# Patient Record
Sex: FEMALE | Race: WHITE | NOT HISPANIC OR LATINO | Employment: OTHER | URBAN - METROPOLITAN AREA
[De-identification: names, ages, dates, MRNs, and addresses within clinical notes are randomized per-mention and may not be internally consistent; named-entity substitution may affect disease eponyms.]

---

## 2017-01-30 ENCOUNTER — ANTICOAGULATION VISIT (OUTPATIENT)
Dept: VASCULAR LAB | Facility: MEDICAL CENTER | Age: 72
End: 2017-01-30
Attending: NURSE PRACTITIONER
Payer: MEDICARE

## 2017-01-30 DIAGNOSIS — G45.9 TRANSIENT CEREBRAL ISCHEMIA, UNSPECIFIED TYPE: ICD-10-CM

## 2017-01-30 LAB — INR PPP: 1.1 (ref 2–3.5)

## 2017-01-30 PROCEDURE — 99211 OFF/OP EST MAY X REQ PHY/QHP: CPT

## 2017-01-30 PROCEDURE — 85610 PROTHROMBIN TIME: CPT

## 2017-01-30 NOTE — MR AVS SNAPSHOT
Felipa Malloy Creveling   2017 4:00 PM   Anticoagulation Visit   MRN: [id number]    Department:  Vascular Medicine   Dept Phone:  [phone]    Description:  Female : 1945   Provider:  [facility] EXAM 5           Allergies as of 2017     No Known Allergies      You were diagnosed with     Transient cerebral ischemia, unspecified type   [[id number]]         Vital Signs     Smoking Status                   Former Smoker           Basic Information     Date Of Birth Sex Race Ethnicity Preferred Language    1945 Female White Non- English      Your appointments     2017  1:00 PM   Established Patient with [facility] EXAM 4   [facility]  (--)    [address] [facility] [address]   [phone]              Problem List              ICD-10-CM Priority Class Noted - Resolved    Hypothyroidism E03.9 Low  2012 - Present    Bradycardia R00.1 Medium  2012 - Present    Ventricular bigeminy I49.9 Medium  2012 - Present    TIA (transient ischemic attack) G45.9 Medium  2013 - Present    Eyelid abnormality H02.9 Low  3/18/2014 - Present    Cerebral infarction (CMS-HCC) I63.9 Medium  2016 - Present    Aphasia R47.01 Low  2016 - Present    VPC's (ventricular premature complexes) I49.3 Medium  2016 - Present    S/P ablation of ventricular arrhythmia Z98.890, Z86.79 Medium  2016 - Present    Decreased GFR R94.4 Medium  2016 - Present      Health Maintenance        Date Due Completion Dates    IMM DTaP/Tdap/Td Vaccine (1 - Tdap) 1963 ---    PAP SMEAR 1965 ---    COLONOSCOPY 1994 ---    IMM ZOSTER VACCINE 2004 ---    IMM INFLUENZA (1) 2016 ---    BONE DENSITY 2016, 3/17/2008    MAMMOGRAM 2017, 2015, 3/3/2014, 2012, 2011, 2010, 2010, 2009, 2009, 2008, 2008, 2007, 2005, 2004    IMM  PNEUMOCOCCAL 65+ (ADULT) LOW/MEDIUM RISK SERIES (2 of 2 - PPSV23) 10/11/2017 10/11/2016            Results     POCT Protime      Component    INR    1.1                        Current Immunizations     13-VALENT PCV PREVNAR 10/11/2016      Below and/or attached are the medications your provider expects you to take. Review all of your home medications and newly ordered medications with your provider and/or pharmacist. Follow medication instructions as directed by your provider and/or pharmacist. Please keep your medication list with you and share with your provider. Update the information when medications are discontinued, doses are changed, or new medications (including over-the-counter products) are added; and carry medication information at all times in the event of emergency situations     Allergies:  No Known Allergies          Medications  Valid as of: January 30, 2017 -  4:27 PM    Generic Name Brand Name Tablet Size Instructions for use    Apixaban (Tab) ELIQUIS 5mg Take 1 Tab by mouth 2 Times a Day.        Atorvastatin Calcium (Tab) LIPITOR 20 MG Take 2 Tabs by mouth every bedtime.        Cholecalciferol (Tab) cholecalciferol 1000 UNIT Take 1,000 Units by mouth every day.        Levothyroxine Sodium   Take  by mouth.        Liothyronine Sodium (Tab) CYTOMEL 5 MCG Take 5 mcg by mouth every morning.        Magnesium Oxide (Cap) Magnesium 500 MG Take  by mouth.        Omega-3 Fatty Acids (CAPSULE DELAYED RELEASE) fish oil 1000 MG Take 1,000 mg by mouth 3 times a day.        .                 Medicines prescribed today were sent to:     [facility] - [address]    [address] [place] [address] [address] [facility] [address]    Phone: [phone] Fax: [phone]    Open 24 Hours?: No    [facility] [address] [place] [address] - [address] [address]    Phone: [phone] Fax: [phone]    Open 24 Hours?: No      Medication refill instructions:        If your prescription bottle indicates you have medication refills left, it is not necessary to call your provider’s office. Please contact your pharmacy and they will refill your medication.    If your prescription bottle indicates you do not have any refills left, you may request refills at any time through one of the following ways: The online [employer] system (except Urgent Care), by calling your provider’s office, or by asking your pharmacy to contact your provider’s office with a refill request. Medication refills are processed only during regular business hours and may not be available until the next business day. Your provider may request additional information or to have a follow-up visit with you prior to refilling your medication.   *Please Note: Medication refills are assigned a new Rx number when refilled electronically. Your pharmacy may indicate that no refills were authorized even though a new prescription for the same medication is available at the pharmacy. Please request the medicine by name with the pharmacy before contacting your provider for a refill.        Your To Do List     Future Labs/Procedures Complete By Expires    BASIC METABOLIC PANEL  As directed 1/30/2018    CBC W/O DIFF OR PLATELETS  As directed 1/30/2018      Warfarin Dosing Calendar   January 2017 Details    Sun Mon Tue Wed Thu Fri Sat     1               2               3               4               5               6               7                 8               9               10               11               12               13               14                 15               16               17               18               19               20               21                 22               23               24               25               26               27               28                 29               30   1.1      See details      31                    Date Details   01/30 This INR check   INR: 1.1                    Warfarin Dosing Calendar   February 2017 Details    Sun Mon Tue Wed Thu Fri Sat        1               2               3               4                 5               6               7               8               9               10               11                 12               13               14               15               16               17               18                 19               20               21               22               23               24               25                 26               27               28                    Date Details   No additional details              Warfarin Dosing Calendar   March 2017 Details    Sun Mon Tue Wed Thu Fri Sat        1               2               3               4                 5               6               7               8               9               10               11                 12               13               14               15               16               17               18                 19               20               21               22               23               24               25                 26               27               28               29               30               31                 Date Details   No additional details              Warfarin Dosing Calendar   April 2017 Details    Sun Mon Tue Wed Thu Fri Sat           1                 2               3               4               5               6               7               8                 9               10               11               12               13               14               15                 16               17               18               19               20               21               22                 23               24               25               26               27               28               29                 30                      Date Details   No additional details               Warfarin Dosing Calendar   May 2017 Details    Sun Mon Tue Wed Thu Fri Sat      1               2               3               4               5               6                 7               8               9               10               11               12               13                 14               15               16               17               18               19               20                 21               22               23               24               25               26               27                 28               29               30               31                   Date Details   No additional details              Warfarin Dosing Calendar   June 2017 Details    Sun Mon Tue Wed Thu Fri Sat         1               2               3                 4               5               6               7               8               9               10                 11               12               13               14               15               16               17                 18               19               20               21               22               23               24                 25               26               27               28               29               30                 Date Details   No additional details              Warfarin Dosing Calendar   July 2017 Details    Sun Mon Tue Wed Thu Fri Sat           1                 2               3               4               5               6               7               8                 9               10               11               12               13               14               15                 16               17               18               19               20               21               22                 23               24               25               26               27               28               29                 30               31                     Date Details   No  additional details    Date of next INR:  7/30/2017              MyChart Access Code: Activation code not generated  Current MyChart Status: Active

## 2017-01-31 NOTE — PROGRESS NOTES
[facility]               Target end date:Indefinite    Health Status Since Last Assessment   Patient denies any new relevant medical problems, ED visits or hospitalizations   Patient denies any embolic events (stroke/tia/systemic embolism)    Adherence with DOAC Therapy   Pt has not missed any doses in the average week    Bleeding Risk Assessment     Denies Epistaxis   Pt denies any excessive or unusual bleeding/hematomas.  Pt denies any GI bleeds or hematemesis.  Pt denies any concerning daily headache or sub dural hematoma symptoms.     Pt denies any hematuria or abnormal vaginal bleeding.   Latest Hemoglobin 14   ETOH overuse Denies     Creatinine Clearance/Renal Function     Latest ClCr CrCl ~83mL/min     Drug Interactions   ASA/other antiplatelets Denies   NSAID Denies   Other drug interactions Negative    Examination   Significant gait impairment/imbalance/high risk for falls? No obvious risks    Final Assessment and Recommendations:   Patient appears stable from the anticoagulation staindpoint.     Benefits of continued DOAC therapy outweigh risks for this patient   Recommend pt continue with current DOAC therapy Eliquis 5 mg BID     Other Actions:   Provided pt with discount card to help reduce cost of medication.  Instructed pt to contact clinic if cost of medication becomes a barrier to therapy.    Follow up:   BMP & CBC ordered    Will follow up in clinic in 6 months    Martinez Hunt, PHARMD

## 2017-07-15 LAB
BUN SERPL-MCNC: 23 MG/DL (ref 8–27)
BUN/CREAT SERPL: 28 (ref 12–28)
CALCIUM SERPL-MCNC: 9.7 MG/DL (ref 8.7–10.3)
CHLORIDE SERPL-SCNC: 102 MMOL/L (ref 96–106)
CO2 SERPL-SCNC: 24 MMOL/L (ref 18–29)
CREAT SERPL-MCNC: 0.82 MG/DL (ref 0.57–1)
ERYTHROCYTE [DISTWIDTH] IN BLOOD BY AUTOMATED COUNT: 14.1 % (ref 12.3–15.4)
GLUCOSE SERPL-MCNC: 91 MG/DL (ref 65–99)
HCT VFR BLD AUTO: 42.3 % (ref 34–46.6)
HGB BLD-MCNC: 14.1 G/DL (ref 11.1–15.9)
MCH RBC QN AUTO: 31.5 PG (ref 26.6–33)
MCHC RBC AUTO-ENTMCNC: 33.3 G/DL (ref 31.5–35.7)
MCV RBC AUTO: 95 FL (ref 79–97)
NRBC BLD AUTO-RTO: NORMAL %
POTASSIUM SERPL-SCNC: 4.3 MMOL/L (ref 3.5–5.2)
RBC # BLD AUTO: 4.47 X10E6/UL (ref 3.77–5.28)
SODIUM SERPL-SCNC: 141 MMOL/L (ref 134–144)
WBC # BLD AUTO: 4.5 X10E3/UL (ref 3.4–10.8)

## 2017-07-31 ENCOUNTER — ANTICOAGULATION VISIT (OUTPATIENT)
Dept: VASCULAR LAB | Facility: MEDICAL CENTER | Age: 72
End: 2017-07-31
Attending: INTERNAL MEDICINE
Payer: MEDICARE

## 2017-07-31 DIAGNOSIS — G45.9 TRANSIENT CEREBRAL ISCHEMIA, UNSPECIFIED TYPE: ICD-10-CM

## 2017-07-31 LAB
INR BLD: 1.2 (ref 0.9–1.2)
INR PPP: 1.2 (ref 2–3.5)

## 2017-07-31 PROCEDURE — 99211 OFF/OP EST MAY X REQ PHY/QHP: CPT

## 2017-07-31 PROCEDURE — 85610 PROTHROMBIN TIME: CPT

## 2017-07-31 NOTE — MR AVS SNAPSHOT
Felipa Malloy Creveling   2017 1:00 PM   Anticoagulation Visit   MRN: [id number]    Department:  Vascular Medicine   Dept Phone:  [phone]    Description:  Female : 1945   Provider:  [facility] EXAM 4           Allergies as of 2017     No Known Allergies      Vital Signs     Smoking Status                   Former Smoker           Basic Information     Date Of Birth Sex Race Ethnicity Preferred Language    1945 Female White Non- English      Your appointments     May 07, 2018  1:00 PM   Established Patient with [facility] EXAM 5   [facility]  (--)    [address] [facility] [address]   [phone]              Problem List              ICD-10-CM Priority Class Noted - Resolved    Hypothyroidism E03.9 Low  2012 - Present    Bradycardia R00.1 Medium  2012 - Present    Ventricular bigeminy I49.9 Medium  2012 - Present    TIA (transient ischemic attack) G45.9 Medium  2013 - Present    Eyelid abnormality H02.9 Low  3/18/2014 - Present    Cerebral infarction (CMS-HCC) I63.9 Medium  2016 - Present    Aphasia R47.01 Low  2016 - Present    VPC's (ventricular premature complexes) I49.3 Medium  2016 - Present    S/P ablation of ventricular arrhythmia Z98.890, Z86.79 Medium  2016 - Present    Decreased GFR R94.4 Medium  2016 - Present      Health Maintenance        Date Due Completion Dates    IMM DTaP/Tdap/Td Vaccine (1 - Tdap) 1963 ---    PAP SMEAR 1965 ---    COLONOSCOPY 1994 ---    IMM ZOSTER VACCINE 2004 ---    BONE DENSITY 2016, 3/17/2008    MAMMOGRAM 2017, 2015, 3/3/2014, 2012, 2011, 2010, 2010, 2009, 2009, 2008, 2008, 2007, 2005, 2004    IMM INFLUENZA (1) 2017 ---    IMM PNEUMOCOCCAL 65+ (ADULT) LOW/MEDIUM RISK SERIES (2 of 2 - PPSV23) 10/11/2017 10/11/2016               Results     POCT Protime      Component    INR    1.2                        Current Immunizations     13-VALENT PCV PREVNAR 10/11/2016      Below and/or attached are the medications your provider expects you to take. Review all of your home medications and newly ordered medications with your provider and/or pharmacist. Follow medication instructions as directed by your provider and/or pharmacist. Please keep your medication list with you and share with your provider. Update the information when medications are discontinued, doses are changed, or new medications (including over-the-counter products) are added; and carry medication information at all times in the event of emergency situations     Allergies:  No Known Allergies          Medications  Valid as of: July 31, 2017 -  1:17 PM    Generic Name Brand Name Tablet Size Instructions for use    Apixaban (Tab) ELIQUIS 5mg Take 1 Tab by mouth 2 Times a Day.        Atorvastatin Calcium (Tab) LIPITOR 20 MG Take 2 Tabs by mouth every bedtime.        Cholecalciferol (Tab) cholecalciferol 1000 UNIT Take 1,000 Units by mouth every day.        Coenzyme Q10 (Cap) coenzyme Q-10 30 MG Take 60 mg by mouth every day.        Levothyroxine Sodium   Take  by mouth.        Liothyronine Sodium (Tab) CYTOMEL 5 MCG Take 5 mcg by mouth every morning.        Magnesium Oxide (Cap) Magnesium 500 MG Take  by mouth.        Omega-3 Fatty Acids (CAPSULE DELAYED RELEASE) fish oil 1000 MG Take 1,000 mg by mouth 3 times a day.        .                 Medicines prescribed today were sent to:     [facility] - [address] [facility] [address]    Phone: [phone] Fax: [phone]    Open 24 Hours?: No    [facility] [address] [place], [place] - [address] [address]    Phone: [phone] Fax: [phone]    Open 24 Hours?: No      Medication refill instructions:       If your  prescription bottle indicates you have medication refills left, it is not necessary to call your provider’s office. Please contact your pharmacy and they will refill your medication.    If your prescription bottle indicates you do not have any refills left, you may request refills at any time through one of the following ways: The online [employer] system (except Urgent Care), by calling your provider’s office, or by asking your pharmacy to contact your provider’s office with a refill request. Medication refills are processed only during regular business hours and may not be available until the next business day. Your provider may request additional information or to have a follow-up visit with you prior to refilling your medication.   *Please Note: Medication refills are assigned a new Rx number when refilled electronically. Your pharmacy may indicate that no refills were authorized even though a new prescription for the same medication is available at the pharmacy. Please request the medicine by name with the pharmacy before contacting your provider for a refill.        Warfarin Dosing Calendar   July 2017 Details    Sun Mon Tue Wed Thu Fri Sat           1                 2               3               4               5               6               7               8                 9               10               11               12               13               14               15                 16               17               18               19               20               21               22                 23               24               25               26               27               28               29                 30               31   1.2      See details            Date Details   07/31 This INR check   INR: 1.2      Date of next INR: No date specified              [employer] Access Code: Activation code not generated  Current [employer] Status: Active

## 2017-07-31 NOTE — PROGRESS NOTES
Target end date:Indefinite     Indication: Afib     Drug: Eliquis      CHADsVASC = at least 4    Health Status Since Last Assessment   Patient denies any new relevant medical problems, ED visits or hospitalizations   Patient denies any embolic events (stroke/tia/systemic embolism)    Adherence with DOAC Therapy   Pt has not missed any doses in the average week    Bleeding Risk Assessment     Denies Epistaxis   Pt denies any excessive or unusual bleeding/hematomas.  Pt denies any GI bleeds or hematemesis.  Pt denies any concerning daily headache or sub dural hematoma symptoms.     Pt denies any hematuria or abnormal vaginal bleeding.   Latest Hemoglobin 14.1   ETOH overuse Denies     Creatinine Clearance/Renal Function     Latest CR:  0.82     Drug Interactions   ASA/other antiplatelets Denies   NSAID Denies   Other drug interactions:  Pt currently on a high dose of tumeric.  Pt self started the medication.  Educated pt of increased bleeding risks. Pt to d/c tumeric.      Examination   Blood Pressure 129/71  HR 55   Symptomatic hypotension None   Significant gait impairment/imbalance/high risk for falls? No obvious risks.      Final Assessment and Recommendations:   Patient appears stable from the anticoagulation staindpoint.     Benefits of continued DOAC therapy outweigh risks for this patient   Recommend pt continue with current DOAC therapy.     Follow up:   Will follow up with patient via clinic in 9 months.      Martinez Hunt, ANNETTAD

## 2017-09-18 DIAGNOSIS — I47.10 SVT (SUPRAVENTRICULAR TACHYCARDIA): ICD-10-CM

## 2017-09-19 RX ORDER — APIXABAN 5 MG/1
TABLET, FILM COATED ORAL
Qty: 180 TAB | Refills: 1 | Status: SHIPPED | OUTPATIENT
Start: 2017-09-19 | End: 2018-03-19 | Stop reason: SDUPTHER

## 2017-09-26 ENCOUNTER — APPOINTMENT (OUTPATIENT)
Dept: RADIOLOGY | Facility: MEDICAL CENTER | Age: 72
End: 2017-09-26
Attending: NURSE PRACTITIONER
Payer: MEDICARE

## 2017-11-01 DIAGNOSIS — Z86.73 HISTORY OF STROKE: ICD-10-CM

## 2017-11-13 ENCOUNTER — HOSPITAL ENCOUNTER (OUTPATIENT)
Dept: RADIOLOGY | Facility: MEDICAL CENTER | Age: 72
End: 2017-11-13
Attending: NURSE PRACTITIONER
Payer: MEDICARE

## 2017-11-13 DIAGNOSIS — Z12.31 VISIT FOR SCREENING MAMMOGRAM: ICD-10-CM

## 2017-11-13 PROCEDURE — G0202 SCR MAMMO BI INCL CAD: HCPCS

## 2018-01-22 ENCOUNTER — OFFICE VISIT (OUTPATIENT)
Dept: CARDIOLOGY | Facility: MEDICAL CENTER | Age: 73
End: 2018-01-22
Payer: MEDICARE

## 2018-01-22 VITALS
WEIGHT: 191 LBS | OXYGEN SATURATION: 97 % | SYSTOLIC BLOOD PRESSURE: 118 MMHG | BODY MASS INDEX: 27.35 KG/M2 | HEIGHT: 70 IN | HEART RATE: 66 BPM | DIASTOLIC BLOOD PRESSURE: 72 MMHG

## 2018-01-22 DIAGNOSIS — Z98.890 S/P ABLATION OF VENTRICULAR ARRHYTHMIA: ICD-10-CM

## 2018-01-22 DIAGNOSIS — I47.10 SVT (SUPRAVENTRICULAR TACHYCARDIA): ICD-10-CM

## 2018-01-22 DIAGNOSIS — Z86.79 S/P ABLATION OF VENTRICULAR ARRHYTHMIA: ICD-10-CM

## 2018-01-22 LAB — EKG IMPRESSION: NORMAL

## 2018-01-22 PROCEDURE — 99214 OFFICE O/P EST MOD 30 MIN: CPT | Performed by: INTERNAL MEDICINE

## 2018-01-22 PROCEDURE — 93000 ELECTROCARDIOGRAM COMPLETE: CPT | Performed by: INTERNAL MEDICINE

## 2018-01-22 ASSESSMENT — ENCOUNTER SYMPTOMS: PALPITATIONS: 0

## 2018-01-22 NOTE — PROGRESS NOTES
Subjective:   Felipa Stuart is a 72 y.o. female who presents today for follow up of pvc s/p ablation    Chief Complaint: relatively speaking doing well she feels no skipping or anything.    Hands get cold.    Never felt palpiations.      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 at [facility]   • OTHER  2013    bladder sling    • OTHER ABDOMINAL SURGERY  2013    cholecystectomy   • CARDIAC CATH      ablation   • CATARACT EXTRACTION WITH IOL     • THYROID LOBECTOMY       Family History   Problem Relation Age of Onset   • Cancer Mother      History   Smoking Status   • Former Smoker   • Years: 20.00   • Types: Cigarettes   • Quit date: 1/1/1980   Smokeless Tobacco   • Never Used     No Known Allergies  Outpatient Encounter Prescriptions as of 1/22/2018   Medication Sig Dispense Refill   • ELIQUIS 5 MG Tab Take 1 tablet by mouth two  times daily 180 Tab 1   • coenzyme Q-10 30 MG capsule Take 60 mg by mouth every day.     • vitamin D (CHOLECALCIFEROL) 1000 UNIT Tab Take 1,000 Units by mouth every day.     • Magnesium 500 MG Cap Take  by mouth.     • atorvastatin (LIPITOR) 20 MG Tab Take 2 Tabs by mouth every bedtime. 30 Tab 11   • Levothyroxine Sodium (LEVOXYL PO) Take  by mouth.     • Omega-3 Fatty Acids (FISH OIL) 1000 MG CAPSULE DELAYED  "RELEASE Take 1,000 mg by mouth 3 times a day. 90 Cap 2   • liothyronine (CYTOMEL) 5 MCG TABS Take 5 mcg by mouth every morning.       No facility-administered encounter medications on file as of 1/22/2018.      Review of Systems   Constitutional: Negative for malaise/fatigue.   Cardiovascular: Negative for palpitations.   Gastrointestinal:        Issues with hiatal hernia        Objective:   /72   Pulse 66   Ht 1.778 m (5' 10\")   Wt 86.6 kg (191 lb)   SpO2 97%   BMI 27.41 kg/m²     Physical Exam   Constitutional: She is oriented to person, place, and time. She appears well-developed and well-nourished. No distress.   HENT:   Head: Normocephalic and atraumatic.   Right Ear: External ear normal.   Left Ear: External ear normal.   Mouth/Throat: Oropharynx is clear and moist.   Eyes: Conjunctivae and EOM are normal. Right eye exhibits no discharge. Left eye exhibits no discharge. No scleral icterus.   Neck: Normal range of motion. Neck supple. No JVD present. No tracheal deviation present. No thyromegaly present.   Cardiovascular: Normal rate, regular rhythm, normal heart sounds and intact distal pulses.  Exam reveals no gallop and no friction rub.    No murmur heard.  Pulmonary/Chest: Effort normal and breath sounds normal. No stridor. No respiratory distress. She has no wheezes. She has no rales.   Abdominal: Soft. She exhibits no distension.   Musculoskeletal: She exhibits no edema or tenderness.   Neurological: She is alert and oriented to person, place, and time. No cranial nerve deficit. Coordination normal.   Skin: Skin is warm and dry. No rash noted. She is not diaphoretic. No erythema. No pallor.   Psychiatric: She has a normal mood and affect. Her behavior is normal. Judgment and thought content normal.   Vitals reviewed.      Assessment:     1. S/P ablation of ventricular arrhythmia     2. SVT (supraventricular tachycardia) (CMS-[facility])  ZIO PATCH MONITOR       Medical Decision Making:  Today's " Assessment / Status / Plan:   Still on eliquis with previous stroke.  Svt that looks like a tach but some short stuff a fib spectrum.  Still not diagnositc of a fib and will get monitor for surveillance to try to get diagnosis.

## 2018-01-31 ENCOUNTER — TELEPHONE (OUTPATIENT)
Dept: CARDIOLOGY | Facility: MEDICAL CENTER | Age: 73
End: 2018-01-31

## 2018-01-31 ENCOUNTER — NON-PROVIDER VISIT (OUTPATIENT)
Dept: CARDIOLOGY | Facility: MEDICAL CENTER | Age: 73
End: 2018-01-31
Attending: INTERNAL MEDICINE
Payer: MEDICARE

## 2018-01-31 DIAGNOSIS — I47.10 SVT (SUPRAVENTRICULAR TACHYCARDIA): ICD-10-CM

## 2018-02-26 ENCOUNTER — TELEPHONE (OUTPATIENT)
Dept: CARDIOLOGY | Facility: MEDICAL CENTER | Age: 73
End: 2018-02-26

## 2018-02-27 NOTE — TELEPHONE ENCOUNTER
I do not have the results available for me to give you an overall review. Dr Sargent is out of the office with this week so I will let you know when he returns.       ===View-only below this line===      ----- Message -----     From: Felipa Stuart     Sent: 2/26/2018  3:44 PM PST       To: Stiven Sargent M.D.  Subject: Test Result Question    Wondering what the result of the heart monitor was?  Thank you  Felipa Stuart

## 2018-03-05 ENCOUNTER — TELEPHONE (OUTPATIENT)
Dept: CARDIOLOGY | Facility: MEDICAL CENTER | Age: 73
End: 2018-03-05

## 2018-03-05 PROCEDURE — 0298T PR EXT ECG > 48HR TO 21 DAY REVIEW AND INTERPRETATN: CPT | Performed by: INTERNAL MEDICINE

## 2018-03-05 PROCEDURE — 0296T PR EXT ECG > 48HR TO 21 DAY RCRD W/CONECT INTL RCRD: CPT | Performed by: INTERNAL MEDICINE

## 2018-03-05 NOTE — TELEPHONE ENCOUNTER
Dr. Sargent reviewed recent [facility] report. Per Dr. Sargent: showed no diagnostic a fib, brief non-sustained SVT (longest<3sec.). Pt. Can schedule [facility] to discuss.  Left message for pt. To call.

## 2018-03-12 ENCOUNTER — OFFICE VISIT (OUTPATIENT)
Dept: CARDIOLOGY | Facility: MEDICAL CENTER | Age: 73
End: 2018-03-12
Payer: MEDICARE

## 2018-03-12 VITALS
DIASTOLIC BLOOD PRESSURE: 70 MMHG | OXYGEN SATURATION: 96 % | HEART RATE: 73 BPM | WEIGHT: 197 LBS | BODY MASS INDEX: 28.2 KG/M2 | HEIGHT: 70 IN | SYSTOLIC BLOOD PRESSURE: 122 MMHG

## 2018-03-12 DIAGNOSIS — I47.10 SVT (SUPRAVENTRICULAR TACHYCARDIA): ICD-10-CM

## 2018-03-12 DIAGNOSIS — R00.1 BRADYCARDIA: ICD-10-CM

## 2018-03-12 LAB — EKG IMPRESSION: NORMAL

## 2018-03-12 PROCEDURE — 99213 OFFICE O/P EST LOW 20 MIN: CPT | Performed by: INTERNAL MEDICINE

## 2018-03-12 PROCEDURE — 93000 ELECTROCARDIOGRAM COMPLETE: CPT | Performed by: INTERNAL MEDICINE

## 2018-03-12 RX ORDER — OMEPRAZOLE 40 MG/1
40 CAPSULE, DELAYED RELEASE ORAL DAILY
COMMUNITY
End: 2022-01-18

## 2018-03-12 ASSESSMENT — ENCOUNTER SYMPTOMS: PALPITATIONS: 1

## 2018-03-12 NOTE — PROGRESS NOTES
Subjective:   Felipa Stuart is a 72 y.o. female who presents today for follow up sp pvc ablation    Chief Complaint: no new issues.    zio nothing diagnostic of a fib  aivr seen.     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;  Service:    • OTHER ABDOMINAL SURGERY  2/2016    hiatal hernia repair, Dr Darnell   • FULL THICKNESS BLOCK RESECTION  3/18/2014    Performed by You Abbott M.D. at [facility]   • OTHER  2013    bladder sling    • OTHER ABDOMINAL SURGERY  2013    cholecystectomy   • CARDIAC CATH      ablation   • CATARACT EXTRACTION WITH IOL     • THYROID LOBECTOMY       Family History   Problem Relation Age of Onset   • Cancer Mother      History   Smoking Status   • Former Smoker   • Years: 20.00   • Types: Cigarettes   • Quit date: 1/1/1980   Smokeless Tobacco   • Never Used     No Known Allergies  Outpatient Encounter Prescriptions as of 3/12/2018   Medication Sig Dispense Refill   • omeprazole (PRILOSEC) 40 MG delayed-release capsule Take 40 mg by mouth 2 times a day.     • ELIQUIS 5 MG Tab Take 1 tablet by mouth two  times daily 180 Tab 1   • coenzyme Q-10 30 MG capsule Take 60 mg by mouth every day.     • vitamin D (CHOLECALCIFEROL) 1000 UNIT Tab Take 1,000 Units by mouth every day.     • Magnesium 500 MG Cap Take  by mouth.     • atorvastatin (LIPITOR) 20 MG Tab Take 2 Tabs by mouth every bedtime. 30 Tab 11   • Levothyroxine Sodium (LEVOXYL PO) Take  "0.05 mcg by mouth every day.     • Omega-3 Fatty Acids (FISH OIL) 1000 MG CAPSULE DELAYED RELEASE Take 1,000 mg by mouth 3 times a day. 90 Cap 2   • liothyronine (CYTOMEL) 5 MCG TABS Take 5 mcg by mouth every morning.       No facility-administered encounter medications on file as of 3/12/2018.      Review of Systems   Cardiovascular: Positive for palpitations.        Objective:   /70   Pulse 73   Ht 1.778 m (5' 10\")   Wt 89.4 kg (197 lb)   SpO2 96%   BMI 28.27 kg/m²     Physical Exam   Constitutional: She is oriented to person, place, and time. She appears well-developed and well-nourished. No distress.   HENT:   Head: Normocephalic and atraumatic.   Right Ear: External ear normal.   Left Ear: External ear normal.   Mouth/Throat: Oropharynx is clear and moist.   Eyes: Conjunctivae and EOM are normal. Right eye exhibits no discharge. Left eye exhibits no discharge. No scleral icterus.   Neck: Normal range of motion. Neck supple. No JVD present. No tracheal deviation present. No thyromegaly present.   Cardiovascular: Normal rate, regular rhythm, normal heart sounds and intact distal pulses.  Exam reveals no gallop and no friction rub.    No murmur heard.  Pulmonary/Chest: Effort normal and breath sounds normal. No stridor. No respiratory distress. She has no wheezes. She has no rales.   Abdominal: Soft. She exhibits no distension.   Musculoskeletal: She exhibits no edema or tenderness.   Neurological: She is alert and oriented to person, place, and time. No cranial nerve deficit. Coordination normal.   Skin: Skin is warm and dry. No rash noted. She is not diaphoretic. No erythema. No pallor.   Psychiatric: She has a normal mood and affect. Her behavior is normal. Judgment and thought content normal.   Vitals reviewed.      Assessment:     1. Bradycardia  EKG   2. SVT (supraventricular tachycardia) (CMS-HCC)         Medical Decision Making:  Today's Assessment / Status / Plan:   A fib spectrum svt but not " long enough to be diagnostic.  Seeing this, I favor continued oac.  She concurs.      No symptoms of armando or short svt or aivr

## 2018-03-19 DIAGNOSIS — I47.10 SVT (SUPRAVENTRICULAR TACHYCARDIA): ICD-10-CM

## 2018-03-19 RX ORDER — APIXABAN 5 MG/1
TABLET, FILM COATED ORAL
Qty: 180 TAB | Refills: 2 | Status: SHIPPED | OUTPATIENT
Start: 2018-03-19 | End: 2019-01-03 | Stop reason: SDUPTHER

## 2018-05-07 ENCOUNTER — ANTICOAGULATION VISIT (OUTPATIENT)
Dept: VASCULAR LAB | Facility: MEDICAL CENTER | Age: 73
End: 2018-05-07
Attending: INTERNAL MEDICINE
Payer: MEDICARE

## 2018-05-07 VITALS — DIASTOLIC BLOOD PRESSURE: 69 MMHG | HEART RATE: 58 BPM | SYSTOLIC BLOOD PRESSURE: 129 MMHG

## 2018-05-07 DIAGNOSIS — I63.9 CEREBRAL INFARCTION, UNSPECIFIED MECHANISM (HCC): ICD-10-CM

## 2018-05-07 PROCEDURE — 99212 OFFICE O/P EST SF 10 MIN: CPT | Performed by: NURSE PRACTITIONER

## 2018-05-07 NOTE — PROGRESS NOTES
Diagnosis: AF  Drug: Eliquis 5 mg BID  Indefinite therapy   CHADsVASC at least 4 (CVA Feb 2016)    Health Status Since Last Assessment  No changes    Adherence with DOAC Therapy  None  BLEEDING RISK ASSESSMENT NB:    Bleeding Risk Assessment  None of the following:  Severe epistaxis   Hemoptysis    Excessive or unusual bruising / hematomas   GIB / melena / BRBPR / hematemesis    Hematuria Abnormal vaginal bleeding    Concerning daily headache or subdural hematoma symptoms    Decreasing hemoglobin or new anemia    Latest hemoglobin:     Lab Results   Component Value Date/Time    WBC 4.5 07/14/2017 10:00 AM    WBC 6.6 07/12/2016 03:56 PM    RBC 4.47 07/14/2017 10:00 AM    RBC 4.67 07/12/2016 03:56 PM    HEMOGLOBIN 14.1 07/14/2017 10:00 AM    HEMOGLOBIN 14.0 07/12/2016 03:56 PM    HEMATOCRIT 42.3 07/14/2017 10:00 AM    HEMATOCRIT 42.7 07/12/2016 03:56 PM    MCV 95 07/14/2017 10:00 AM    MCV 91.4 07/12/2016 03:56 PM    MCH 31.5 07/14/2017 10:00 AM    MCH 30.0 07/12/2016 03:56 PM    MCHC 33.3 07/14/2017 10:00 AM    MCHC 32.8 (L) 07/12/2016 03:56 PM    MPV 11.2 07/12/2016 03:56 PM    NEUTSPOLYS 70.60 07/12/2016 03:56 PM    LYMPHOCYTES 21.80 (L) 07/12/2016 03:56 PM    MONOCYTES 5.20 07/12/2016 03:56 PM    EOSINOPHILS 1.20 07/12/2016 03:56 PM    BASOPHILS 0.90 07/12/2016 03:56 PM        EtOH overuse - no  Falls, presyncope, syncope, or seizures  - no  Uncontrolled hypertension - no  CREATININE CLEARANCE /    Creatinine Clearance/Renal Function  Latest eGFR / creatinine:  Lab Results   Component Value Date/Time    SODIUM 141 07/14/2017 10:00 AM    SODIUM 139 07/12/2016 03:56 PM    POTASSIUM 4.3 07/14/2017 10:00 AM    POTASSIUM 3.8 07/12/2016 03:56 PM    CHLORIDE 102 07/14/2017 10:00 AM    CHLORIDE 105 07/12/2016 03:56 PM    CO2 24 07/14/2017 10:00 AM    CO2 26 07/12/2016 03:56 PM    GLUCOSE 91 07/14/2017 10:00 AM    GLUCOSE 111 (H) 07/12/2016 03:56 PM    BUN 23 07/14/2017 10:00 AM    BUN 22 07/12/2016 03:56 PM    CREATININE  0.82 07/14/2017 10:00 AM    CREATININE 1.16 07/12/2016 03:56 PM    BUNCREATRAT 28 07/14/2017 10:00 AM      • cr cl 0.82 (July 2017) - in need of labs    If YES, calculate CrCl (see back)  Any recent dehydrating illness or medications added/changed? i.e. diuretics    Drug Interactions  ASA / other antiplatelets - none  NSAID - none  Other drug interactions   (Review med list / OTCs;)  Current Outpatient Prescriptions on File Prior to Visit   Medication Sig Dispense Refill   • ELIQUIS 5 MG Tab TAKE 1 TABLET BY MOUTH TWO  TIMES DAILY 180 Tab 2   • omeprazole (PRILOSEC) 40 MG delayed-release capsule Take 40 mg by mouth 2 times a day.     • coenzyme Q-10 30 MG capsule Take 60 mg by mouth every day.     • vitamin D (CHOLECALCIFEROL) 1000 UNIT Tab Take 1,000 Units by mouth every day.     • Magnesium 500 MG Cap Take  by mouth.     • atorvastatin (LIPITOR) 20 MG Tab Take 2 Tabs by mouth every bedtime. 30 Tab 11   • Levothyroxine Sodium (LEVOXYL PO) Take 0.05 mcg by mouth every day.     • Omega-3 Fatty Acids (FISH OIL) 1000 MG CAPSULE DELAYED RELEASE Take 1,000 mg by mouth 3 times a day. 90 Cap 2   • liothyronine (CYTOMEL) 5 MCG TABS Take 5 mcg by mouth every morning.       No current facility-administered medications on file prior to visit.        Examination  Blood pressure:  129/69  • Elevated BP  no (sBP greater than 160 mmHg)  • Symptomatic hypotension  no  Significant gait impairment / imbalance / high risk for falls  no    Final Assessment and Recommendations:  Patient appears stable from the anticoagulation standpoint  Benefits of continued DOAC therapy outweigh risks for this patient  Recommend continue current DOAC at same dose    - continue taking Eliquis 5 mg by mouth twice daily  - do not stop this med without permission from your doctor  - get labs done as soon as you can and again prior to next appt  - go to ER for any s/sx of CVA or prolonged bleeding    Other Actions:   The rationale for continued DOAC  therapy  The potential for minor, major or life-threatening bleeding  Dosing instructions, adherence, risks of non-adherence, handling missed doses  Avoiding OTC ASA & NSAIDs & minimizing EtOH to reduce bleeding risks  Dosing around upcoming procedure / surgery if applicable (see back)    Follow up:  Will follow up with patient in 6 months with labs.    Next Appointment: Monday, November 5, 2018 at 1:00 pm.     Yolanda MILES

## 2018-06-27 ENCOUNTER — DOCUMENTATION (OUTPATIENT)
Dept: VASCULAR LAB | Facility: MEDICAL CENTER | Age: 73
End: 2018-06-27

## 2018-06-27 NOTE — PROGRESS NOTES
Received labs from [facility]. Cr 1.00, actual cr cl 69.3, plt 216. She is to continue Xarelto. Will f/u with pt in November.    Yolanda MILES

## 2018-11-06 DIAGNOSIS — I48.91 ATRIAL FIBRILLATION, UNSPECIFIED TYPE (HCC): ICD-10-CM

## 2018-11-20 ENCOUNTER — APPOINTMENT (OUTPATIENT)
Dept: RADIOLOGY | Facility: MEDICAL CENTER | Age: 73
End: 2018-11-20
Attending: NURSE PRACTITIONER
Payer: MEDICARE

## 2018-11-20 ENCOUNTER — ANTICOAGULATION VISIT (OUTPATIENT)
Dept: VASCULAR LAB | Facility: MEDICAL CENTER | Age: 73
End: 2018-11-20
Attending: INTERNAL MEDICINE
Payer: MEDICARE

## 2018-11-20 VITALS — SYSTOLIC BLOOD PRESSURE: 113 MMHG | HEART RATE: 53 BPM | DIASTOLIC BLOOD PRESSURE: 76 MMHG

## 2018-11-20 DIAGNOSIS — Z12.39 ENCOUNTER FOR SCREENING FOR MALIGNANT NEOPLASM OF BREAST: ICD-10-CM

## 2018-11-20 DIAGNOSIS — G45.9 TIA (TRANSIENT ISCHEMIC ATTACK): ICD-10-CM

## 2018-11-20 LAB — INR PPP: 1.1 (ref 2–3.5)

## 2018-11-20 PROCEDURE — 99212 OFFICE O/P EST SF 10 MIN: CPT

## 2018-11-20 PROCEDURE — 77067 SCR MAMMO BI INCL CAD: CPT

## 2018-11-20 PROCEDURE — 85610 PROTHROMBIN TIME: CPT

## 2018-11-20 NOTE — PROGRESS NOTES
Target end date: indefinite     Indication: AF     Drug: Eliqis 5 mg BID     CHADsVASC = at least 4    Health Status Since Last Assessment   Patient denies any new relevant medical problems, ED visits or hospitalizations   Patient denies any embolic events (stroke/tia/systemic embolism)    Adherence with DOAC Therapy   Pt has 0-1 missed any doses in the average week    Bleeding Risk Assessment     Denies Epistaxis   Pt denies any excessive or unusual bleeding/hematomas.  Pt denies any GI bleeds or hematemesis.  Pt denies any concerning daily headache or sub dural hematoma symptoms.     Pt denies any hematuria or abnormal vaginal bleeding.   Latest Hemoglobin WNL   ETOH overuse denies     Creatinine Clearance/Renal Function     Latest ClCr > 50 mL/min     Drug Interactions   Platelets: WNL   ASA/other antiplatelets none   NSAID none   Other drug interactions not of clinical significance.   X Verified no anticonvulsant or azole therapy, education provided for future use.     Examination   Blood Pressure no obvious risk.    Symptomatic hypotension not mentioned.    Significant gait impairment/imbalance/high risk for falls? No obvious risks.     Final Assessment and Recommendations:   Patient appears stable from the anticoagulation staindpoint.     Benefits of continued DOAC therapy outweigh risks for this patient   Recommend pt continue with current DOAC therapy      Other Actions: cmp/ cbc hemogram ordered prior to next visit    Follow up:   Will follow up with patient via clinic in 6 months.    Martinez Hunt, HollyD

## 2018-12-18 ENCOUNTER — APPOINTMENT (OUTPATIENT)
Dept: RADIOLOGY | Facility: MEDICAL CENTER | Age: 73
End: 2018-12-18
Attending: NURSE PRACTITIONER
Payer: MEDICARE

## 2019-01-02 DIAGNOSIS — I47.10 SVT (SUPRAVENTRICULAR TACHYCARDIA): ICD-10-CM

## 2019-01-03 ENCOUNTER — OFFICE VISIT (OUTPATIENT)
Dept: CARDIOLOGY | Facility: MEDICAL CENTER | Age: 74
End: 2019-01-03
Payer: MEDICARE

## 2019-01-03 ENCOUNTER — APPOINTMENT (OUTPATIENT)
Dept: RADIOLOGY | Facility: MEDICAL CENTER | Age: 74
End: 2019-01-03
Attending: NURSE PRACTITIONER
Payer: MEDICARE

## 2019-01-03 VITALS
HEART RATE: 54 BPM | HEIGHT: 70 IN | WEIGHT: 191 LBS | BODY MASS INDEX: 27.35 KG/M2 | DIASTOLIC BLOOD PRESSURE: 72 MMHG | OXYGEN SATURATION: 97 % | SYSTOLIC BLOOD PRESSURE: 120 MMHG

## 2019-01-03 DIAGNOSIS — Z98.890 S/P ABLATION OF VENTRICULAR ARRHYTHMIA: ICD-10-CM

## 2019-01-03 DIAGNOSIS — Z86.79 S/P ABLATION OF VENTRICULAR ARRHYTHMIA: ICD-10-CM

## 2019-01-03 DIAGNOSIS — G45.8 OTHER SPECIFIED TRANSIENT CEREBRAL ISCHEMIAS: ICD-10-CM

## 2019-01-03 DIAGNOSIS — I63.9 CEREBRAL INFARCTION, UNSPECIFIED MECHANISM (HCC): ICD-10-CM

## 2019-01-03 DIAGNOSIS — Z79.01 CHRONIC ANTICOAGULATION: Chronic | ICD-10-CM

## 2019-01-03 DIAGNOSIS — G45.9 TIA (TRANSIENT ISCHEMIC ATTACK): ICD-10-CM

## 2019-01-03 DIAGNOSIS — I47.10 SVT (SUPRAVENTRICULAR TACHYCARDIA): ICD-10-CM

## 2019-01-03 DIAGNOSIS — I44.2 AIVR (ACCELERATED IDIOVENTRICULAR RHYTHM) (HCC): Chronic | ICD-10-CM

## 2019-01-03 DIAGNOSIS — K44.9 HIATAL HERNIA: Chronic | ICD-10-CM

## 2019-01-03 PROCEDURE — 99214 OFFICE O/P EST MOD 30 MIN: CPT | Performed by: INTERNAL MEDICINE

## 2019-01-03 RX ORDER — LEVOTHYROXINE SODIUM 0.05 MG/1
TABLET ORAL
COMMUNITY
Start: 2018-11-21 | End: 2019-07-08

## 2019-01-03 RX ORDER — OMEGA-3-ACID ETHYL ESTERS 1 G/1
CAPSULE, LIQUID FILLED ORAL
COMMUNITY
Start: 2018-12-01 | End: 2019-07-08

## 2019-01-03 RX ORDER — ATORVASTATIN CALCIUM 20 MG/1
40 TABLET, FILM COATED ORAL
Qty: 90 TAB | Refills: 3 | Status: SHIPPED | OUTPATIENT
Start: 2019-01-03 | End: 2019-02-20 | Stop reason: SDUPTHER

## 2019-01-03 ASSESSMENT — ENCOUNTER SYMPTOMS
CLAUDICATION: 0
FEVER: 0
SORE THROAT: 0
DIZZINESS: 0
PALPITATIONS: 0
WEAKNESS: 0
NAUSEA: 0
BLURRED VISION: 0
BRUISES/BLEEDS EASILY: 0
PND: 0
ABDOMINAL PAIN: 0
FOCAL WEAKNESS: 0
FALLS: 0
SHORTNESS OF BREATH: 0
CHILLS: 0
COUGH: 0

## 2019-01-03 NOTE — LETTER
[facility]   [address], [address]  [place] [address]  Phone: [phone]  Fax: [phone]              Felipa Stuart  1945    Encounter Date: 1/3/2019    Mir Carvajal M.D.          PROGRESS NOTE:  Chief Complaint   Patient presents with   • Bradycardia   • Supraventricular Tachycardia (SVT)       Subjective:   Felipa Stuart is a 73 y.o. female who presents today for follow-up of her history of SVT, PVCs status post ablation and AI VR on Zio patch monitoring    Doing on chronic anticoagulation    She follows closely with GI for history of hiatal hernia      Past Medical History:   Diagnosis Date   • AIVR (accelerated idioventricular rhythm) (HCC) on Zio    • Anesthesia     post op nausea   • Arrhythmia     bigeminal PVC's, SVT with questionable Afib   • Bradycardia    • CATARACT    • Chronic anticoagulation - concern for afib    • Hiatal hernia    • Hiatus hernia syndrome     repaired 2/28/2016   • HX: benign breast biopsy     Rt breast   • Hypothyroidism    • Stroke (HCC) 2/28/2016    mild speech delay     Past Surgical History:   Procedure Laterality Date   • RECOVERY  7/13/2016    Procedure: CATH LAB-PVC ABLATION WAGNER-REGAN -CARTO;  Surgeon: [facility];  Location: SURGERY PRE-POST PROC UNIT [facility];  Service:    • OTHER ABDOMINAL SURGERY  2/2016    hiatal hernia repair, Dr Darnell   • FULL THICKNESS BLOCK RESECTION  3/18/2014    Performed by You Abbott M.D. at [facility]   • OTHER  2013    bladder sling    • OTHER ABDOMINAL SURGERY  2013    cholecystectomy   • CARDIAC CATH      ablation   • CATARACT EXTRACTION WITH IOL     • THYROID LOBECTOMY       Family History   Problem Relation Age of Onset   • Cancer Mother      Social History     Social History   • Marital status:      Spouse name: N/A   • Number of children: N/A   • Years of education: N/A     Occupational History   • Not on  file.     Social History Main Topics   • Smoking status: Former Smoker     Years: 20.00     Types: Cigarettes     Quit date: 1/1/1980   • Smokeless tobacco: Never Used   • Alcohol use Yes      Comment: none in 2016   • Drug use: No   • Sexual activity: Not on file     Other Topics Concern   • Not on file     Social History Narrative   • No narrative on file     No Known Allergies  Outpatient Encounter Prescriptions as of 1/3/2019   Medication Sig Dispense Refill   • levothyroxine (SYNTHROID) 50 MCG Tab      • apixaban (ELIQUIS) 5mg Tab Take 1 Tab by mouth 2 Times a Day. 180 Tab 3   • atorvastatin (LIPITOR) 20 MG Tab Take 2 Tabs by mouth every bedtime. 90 Tab 3   • omeprazole (PRILOSEC) 40 MG delayed-release capsule Take 40 mg by mouth 2 times a day.     • vitamin D (CHOLECALCIFEROL) 1000 UNIT Tab Take 1,000 Units by mouth every day.     • Magnesium 500 MG Cap Take  by mouth.     • Omega-3 Fatty Acids (FISH OIL) 1000 MG CAPSULE DELAYED RELEASE Take 1,000 mg by mouth 3 times a day. 90 Cap 2   • liothyronine (CYTOMEL) 5 MCG TABS Take 5 mcg by mouth every morning.     • omega-3 acid ethyl esters (LOVAZA) 1 GM capsule      • [DISCONTINUED] ELIQUIS 5 MG Tab TAKE 1 TABLET BY MOUTH TWO  TIMES DAILY 180 Tab 2   • [DISCONTINUED] coenzyme Q-10 30 MG capsule Take 60 mg by mouth every day.     • [DISCONTINUED] atorvastatin (LIPITOR) 20 MG Tab Take 2 Tabs by mouth every bedtime. 30 Tab 11   • Levothyroxine Sodium (LEVOXYL PO) Take 0.05 mcg by mouth every day.       No facility-administered encounter medications on file as of 1/3/2019.      Review of Systems   Constitutional: Negative for chills and fever.   HENT: Negative for sore throat.    Eyes: Negative for blurred vision.   Respiratory: Negative for cough and shortness of breath.    Cardiovascular: Negative for chest pain, palpitations, claudication, leg swelling and PND.   Gastrointestinal: Negative for abdominal pain and nausea.   Musculoskeletal: Negative for falls and  "joint pain.   Skin: Negative for rash.   Neurological: Negative for dizziness, focal weakness and weakness.   Endo/Heme/Allergies: Does not bruise/bleed easily.        Objective:   /72 (BP Location: Left arm, Patient Position: Sitting, BP Cuff Size: Adult)   Pulse (!) 54   Ht 1.778 m (5' 10\")   Wt 86.6 kg (191 lb)   SpO2 97%   BMI 27.41 kg/m²      Physical Exam   Constitutional: No distress.   HENT:   Mouth/Throat: Oropharynx is clear and moist. No oropharyngeal exudate.   Eyes: No scleral icterus.   Neck: No JVD present.   Cardiovascular: Normal rate and normal heart sounds.  Exam reveals no gallop and no friction rub.    No murmur heard.  Pulmonary/Chest: No respiratory distress. She has no wheezes. She has no rales.   Abdominal: Soft. Bowel sounds are normal.   Musculoskeletal: She exhibits no edema.   Neurological: She is alert.   Skin: No rash noted. She is not diaphoretic.   Psychiatric: She has a normal mood and affect.       Assessment:     1. S/P ablation of ventricular arrhythmia     2. TIA (transient ischemic attack)     3. Cerebral infarction, unspecified mechanism (HCC)     4. Chronic anticoagulation - concern for afib     5. AIVR (accelerated idioventricular rhythm) (HCC) on Zio     6. Hiatal hernia     7. SVT (supraventricular tachycardia) (HCC)  apixaban (ELIQUIS) 5mg Tab   8. Other specified transient cerebral ischemias  atorvastatin (LIPITOR) 20 MG Tab       Medical Decision Making:  Today's Assessment / Status / Plan:     It was my pleasure to meet with Ms. Stuart.    Blood pressure is well controlled.      She is on appropriate statin.    She agrees with long-term anticoagulation    She required endoscopy she can safely hold her Eliquis for at least 2 days    I will see Ms. Stuart back in 1 year time and encouraged her to follow up with us over the phone or e-mail using my MyChart as issues arise.    It is my pleasure to participate in the care of Ms. Stuart.  Please do not " hesitate to contact me with questions or concerns.    Mir Carvajal MD PhD [facility]  Cardiologist [facility]        Riddhi Ortega, OLIVIER.P.N.  [address] [address]  VIA Facsimile: [phone]     Elan Ocasio M.D.  [address] [place] [address]  VIA Facsimile: [phone]

## 2019-01-03 NOTE — PROGRESS NOTES
Chief Complaint   Patient presents with   • Bradycardia   • Supraventricular Tachycardia (SVT)       Subjective:   Felipa Stuart is a 73 y.o. female who presents today for follow-up of her history of SVT, PVCs status post ablation and AI VR on Zio patch monitoring    Doing on chronic anticoagulation    She follows closely with GI for history of hiatal hernia      Past Medical History:   Diagnosis Date   • AIVR (accelerated idioventricular rhythm) (HCC) on Zio    • Anesthesia     post op nausea   • Arrhythmia     bigeminal PVC's, SVT with questionable Afib   • Bradycardia    • CATARACT    • Chronic anticoagulation - concern for afib    • Hiatal hernia    • Hiatus hernia syndrome     repaired 2/28/2016   • HX: benign breast biopsy     Rt breast   • Hypothyroidism    • Stroke (HCC) 2/28/2016    mild speech delay     Past Surgical History:   Procedure Laterality Date   • RECOVERY  7/13/2016    Procedure: CATH LAB-PVC ABLATION WAGNER-REGAN -CARTO;  Surgeon: [facility];  Location: SURGERY PRE-POST PROC UNIT [facility];  Service:    • OTHER ABDOMINAL SURGERY  2/2016    hiatal hernia repair, Dr Darnell   • FULL THICKNESS BLOCK RESECTION  3/18/2014    Performed by You Abbott M.D. at [facility]   • OTHER  2013    bladder sling    • OTHER ABDOMINAL SURGERY  2013    cholecystectomy   • CARDIAC CATH      ablation   • CATARACT EXTRACTION WITH IOL     • THYROID LOBECTOMY       Family History   Problem Relation Age of Onset   • Cancer Mother      Social History     Social History   • Marital status:      Spouse name: N/A   • Number of children: N/A   • Years of education: N/A     Occupational History   • Not on file.     Social History Main Topics   • Smoking status: Former Smoker     Years: 20.00     Types: Cigarettes     Quit date: 1/1/1980   • Smokeless tobacco: Never Used   • Alcohol use Yes      Comment: none in 2016   • Drug use: No   • Sexual activity: Not on file     Other Topics  Concern   • Not on file     Social History Narrative   • No narrative on file     No Known Allergies  Outpatient Encounter Prescriptions as of 1/3/2019   Medication Sig Dispense Refill   • levothyroxine (SYNTHROID) 50 MCG Tab      • apixaban (ELIQUIS) 5mg Tab Take 1 Tab by mouth 2 Times a Day. 180 Tab 3   • atorvastatin (LIPITOR) 20 MG Tab Take 2 Tabs by mouth every bedtime. 90 Tab 3   • omeprazole (PRILOSEC) 40 MG delayed-release capsule Take 40 mg by mouth 2 times a day.     • vitamin D (CHOLECALCIFEROL) 1000 UNIT Tab Take 1,000 Units by mouth every day.     • Magnesium 500 MG Cap Take  by mouth.     • Omega-3 Fatty Acids (FISH OIL) 1000 MG CAPSULE DELAYED RELEASE Take 1,000 mg by mouth 3 times a day. 90 Cap 2   • liothyronine (CYTOMEL) 5 MCG TABS Take 5 mcg by mouth every morning.     • omega-3 acid ethyl esters (LOVAZA) 1 GM capsule      • [DISCONTINUED] ELIQUIS 5 MG Tab TAKE 1 TABLET BY MOUTH TWO  TIMES DAILY 180 Tab 2   • [DISCONTINUED] coenzyme Q-10 30 MG capsule Take 60 mg by mouth every day.     • [DISCONTINUED] atorvastatin (LIPITOR) 20 MG Tab Take 2 Tabs by mouth every bedtime. 30 Tab 11   • Levothyroxine Sodium (LEVOXYL PO) Take 0.05 mcg by mouth every day.       No facility-administered encounter medications on file as of 1/3/2019.      Review of Systems   Constitutional: Negative for chills and fever.   HENT: Negative for sore throat.    Eyes: Negative for blurred vision.   Respiratory: Negative for cough and shortness of breath.    Cardiovascular: Negative for chest pain, palpitations, claudication, leg swelling and PND.   Gastrointestinal: Negative for abdominal pain and nausea.   Musculoskeletal: Negative for falls and joint pain.   Skin: Negative for rash.   Neurological: Negative for dizziness, focal weakness and weakness.   Endo/Heme/Allergies: Does not bruise/bleed easily.        Objective:   /72 (BP Location: Left arm, Patient Position: Sitting, BP Cuff Size: Adult)   Pulse (!) 54   Ht  "1.778 m (5' 10\")   Wt 86.6 kg (191 lb)   SpO2 97%   BMI 27.41 kg/m²     Physical Exam   Constitutional: No distress.   HENT:   Mouth/Throat: Oropharynx is clear and moist. No oropharyngeal exudate.   Eyes: No scleral icterus.   Neck: No JVD present.   Cardiovascular: Normal rate and normal heart sounds.  Exam reveals no gallop and no friction rub.    No murmur heard.  Pulmonary/Chest: No respiratory distress. She has no wheezes. She has no rales.   Abdominal: Soft. Bowel sounds are normal.   Musculoskeletal: She exhibits no edema.   Neurological: She is alert.   Skin: No rash noted. She is not diaphoretic.   Psychiatric: She has a normal mood and affect.       Assessment:     1. S/P ablation of ventricular arrhythmia     2. TIA (transient ischemic attack)     3. Cerebral infarction, unspecified mechanism (HCC)     4. Chronic anticoagulation - concern for afib     5. AIVR (accelerated idioventricular rhythm) ([facility]) on Zio     6. Hiatal hernia     7. SVT (supraventricular tachycardia) ([facility])  apixaban (ELIQUIS) 5mg Tab   8. Other specified transient cerebral ischemias  atorvastatin (LIPITOR) 20 MG Tab       Medical Decision Making:  Today's Assessment / Status / Plan:     It was my pleasure to meet with Ms. Stuart.    Blood pressure is well controlled.      She is on appropriate statin.    She agrees with long-term anticoagulation    She required endoscopy she can safely hold her Eliquis for at least 2 days    I will see Ms. Stuart back in 1 year time and encouraged her to follow up with us over the phone or e-mail using my MyChart as issues arise.    It is my pleasure to participate in the care of Ms. Stuart.  Please do not hesitate to contact me with questions or concerns.    Mir Carvajal MD PhD [facility]  Cardiologist [facility]    "

## 2019-01-16 ENCOUNTER — APPOINTMENT (OUTPATIENT)
Dept: RADIOLOGY | Facility: MEDICAL CENTER | Age: 74
End: 2019-01-16
Attending: NURSE PRACTITIONER
Payer: MEDICARE

## 2019-02-05 ENCOUNTER — HOSPITAL ENCOUNTER (OUTPATIENT)
Dept: RADIOLOGY | Facility: MEDICAL CENTER | Age: 74
End: 2019-02-05
Attending: NURSE PRACTITIONER
Payer: MEDICARE

## 2019-02-05 DIAGNOSIS — N95.1 SYMPTOMATIC MENOPAUSAL OR FEMALE CLIMACTERIC STATES: ICD-10-CM

## 2019-02-05 DIAGNOSIS — M85.88 OTHER SPECIFIED DISORDERS OF BONE DENSITY AND STRUCTURE, OTHER SITE: ICD-10-CM

## 2019-02-05 PROCEDURE — 77080 DXA BONE DENSITY AXIAL: CPT

## 2019-02-20 DIAGNOSIS — G45.8 OTHER SPECIFIED TRANSIENT CEREBRAL ISCHEMIAS: ICD-10-CM

## 2019-02-20 DIAGNOSIS — E78.49 OTHER HYPERLIPIDEMIA: ICD-10-CM

## 2019-02-20 RX ORDER — ATORVASTATIN CALCIUM 20 MG/1
20 TABLET, FILM COATED ORAL
Qty: 90 TAB | Refills: 3 | Status: CANCELLED | OUTPATIENT
Start: 2019-02-20

## 2019-02-20 RX ORDER — ATORVASTATIN CALCIUM 20 MG/1
20 TABLET, FILM COATED ORAL
Qty: 90 TAB | Refills: 3 | Status: SHIPPED | OUTPATIENT
Start: 2019-02-20 | End: 2023-07-06 | Stop reason: SDUPTHER

## 2019-03-18 ENCOUNTER — TELEPHONE (OUTPATIENT)
Dept: CARDIOLOGY | Facility: MEDICAL CENTER | Age: 74
End: 2019-03-18

## 2019-03-18 NOTE — TELEPHONE ENCOUNTER
"Received fax from [facility] (P: [phone] F: [phone]) requesting cardiac clearance for EGD and Eliquis hold for 3 days.      Per MD last OV note, \"She required endoscopy she can safely hold her Eliquis for at least 2 days.\"    Clearance completed with MD recommendations.  Fax sent to [facility], [phone], completed status.    Clearance sent to [facility] for reference.  "

## 2019-05-10 LAB
ALBUMIN SERPL-MCNC: 4.4 G/DL (ref 3.5–4.8)
ALBUMIN/GLOB SERPL: 1.8 {RATIO} (ref 1.2–2.2)
ALP SERPL-CCNC: 80 IU/L (ref 39–117)
ALT SERPL-CCNC: 15 IU/L (ref 0–32)
AST SERPL-CCNC: 21 IU/L (ref 0–40)
BILIRUB SERPL-MCNC: 0.8 MG/DL (ref 0–1.2)
BUN SERPL-MCNC: 27 MG/DL (ref 8–27)
BUN/CREAT SERPL: 30 (ref 12–28)
CALCIUM SERPL-MCNC: 9.3 MG/DL (ref 8.7–10.3)
CHLORIDE SERPL-SCNC: 107 MMOL/L (ref 96–106)
CO2 SERPL-SCNC: 23 MMOL/L (ref 20–29)
CREAT SERPL-MCNC: 0.9 MG/DL (ref 0.57–1)
ERYTHROCYTE [DISTWIDTH] IN BLOOD BY AUTOMATED COUNT: 14.1 % (ref 12.3–15.4)
GLOBULIN SER CALC-MCNC: 2.4 G/DL (ref 1.5–4.5)
GLUCOSE SERPL-MCNC: 100 MG/DL (ref 65–99)
HCT VFR BLD AUTO: 39.9 % (ref 34–46.6)
HGB BLD-MCNC: 13.4 G/DL (ref 11.1–15.9)
MCH RBC QN AUTO: 29.7 PG (ref 26.6–33)
MCHC RBC AUTO-ENTMCNC: 33.6 G/DL (ref 31.5–35.7)
MCV RBC AUTO: 89 FL (ref 79–97)
NRBC BLD AUTO-RTO: NORMAL %
PLATELET # BLD AUTO: 228 X10E3/UL (ref 150–379)
POTASSIUM SERPL-SCNC: 3.7 MMOL/L (ref 3.5–5.2)
PROT SERPL-MCNC: 6.8 G/DL (ref 6–8.5)
RBC # BLD AUTO: 4.51 X10E6/UL (ref 3.77–5.28)
SODIUM SERPL-SCNC: 145 MMOL/L (ref 134–144)
WBC # BLD AUTO: 5.7 X10E3/UL (ref 3.4–10.8)

## 2019-05-21 ENCOUNTER — ANTICOAGULATION VISIT (OUTPATIENT)
Dept: VASCULAR LAB | Facility: MEDICAL CENTER | Age: 74
End: 2019-05-21
Attending: INTERNAL MEDICINE
Payer: MEDICARE

## 2019-05-21 DIAGNOSIS — Z79.01 CHRONIC ANTICOAGULATION: ICD-10-CM

## 2019-05-21 LAB
INR BLD: 1.2 (ref 0.9–1.2)
INR PPP: 1.2 (ref 2–3.5)

## 2019-05-21 PROCEDURE — 85610 PROTHROMBIN TIME: CPT

## 2019-05-21 PROCEDURE — 99211 OFF/OP EST MAY X REQ PHY/QHP: CPT

## 2019-05-21 NOTE — PROGRESS NOTES
Anticoagulation Summary  As of 2019    INR goal:      TTR:   --   INR used for dosin.20 (2019)   Warfarin maintenance plan:   No maintenance plan   Next INR check:   2019   Target end date:   Indefinite    Indications    Chronic anticoagulation - concern for afib [Z79.01]             Anticoagulation Episode Summary     INR check location:       Preferred lab:       Send INR reminders to:       Comments:   Eliquis      Anticoagulation Care Providers     Provider Role Specialty Phone number    BRIELLE Obando Referring [facility] [phone]    [facility] Anticoagulation Services Responsible  [phone]        Anticoagulation Patient Findings        Health Status Since Last Assessment  Any new relevant medical problems, ED visits/hospitalizationsn  Any embolic events (stroke / TIA / systemic embolism)n    Adherence with DOAC Therapy  1 or more missed doses in an average week n  • If yes, number of missed doses n  BLEEDING RISK ASSESSMENT NB:    Bleeding Risk Assessment  Severe epistaxis  n Hemoptysis  n  Excessive or unusual bruising / hematomas n  GIB / melena / BRBPR / hematemesis  n  Hematuria? Abnormal vaginal bleeding  n  Concerning daily headache or subdural hematoma symptoms  n  Decreasing hemoglobin or new anemia  n  Latest hemoglobin:     Lab Results   Component Value Date/Time    WBC 5.7 2019 07:40 AM    WBC 6.6 2016 03:56 PM    RBC 4.51 2019 07:40 AM    RBC 4.67 2016 03:56 PM    HEMOGLOBIN 13.4 2019 07:40 AM    HEMOGLOBIN 14.0 2016 03:56 PM    HEMATOCRIT 39.9 2019 07:40 AM    HEMATOCRIT 42.7 2016 03:56 PM    MCV 89 2019 07:40 AM    MCV 91.4 2016 03:56 PM    MCH 29.7 2019 07:40 AM    MCH 30.0 2016 03:56 PM    MCHC 33.6 2019 07:40 AM    MCHC 32.8 (L) 2016 03:56 PM    MPV 11.2 2016 03:56 PM    NEUTSPOLYS 70.60 2016 03:56 PM    LYMPHOCYTES 21.80 (L) 2016 03:56 PM    MONOCYTES 5.20  07/12/2016 03:56 PM    EOSINOPHILS 1.20 07/12/2016 03:56 PM    BASOPHILS 0.90 07/12/2016 03:56 PM        EtOH overuse  n  Falls, presyncope, syncope, or seizures  n  Uncontrolled hypertension * **  CREATININE CLEARANCE /    Creatinine Clearance/Renal Function  Latest eGFR / creatinine:  Lab Results   Component Value Date/Time    SODIUM 145 (H) 05/09/2019 07:40 AM    SODIUM 139 07/12/2016 03:56 PM    POTASSIUM 3.7 05/09/2019 07:40 AM    POTASSIUM 3.8 07/12/2016 03:56 PM    CHLORIDE 107 (H) 05/09/2019 07:40 AM    CHLORIDE 105 07/12/2016 03:56 PM    CO2 23 05/09/2019 07:40 AM    CO2 26 07/12/2016 03:56 PM    GLUCOSE 100 (H) 05/09/2019 07:40 AM    GLUCOSE 111 (H) 07/12/2016 03:56 PM    BUN 27 05/09/2019 07:40 AM    BUN 22 07/12/2016 03:56 PM    CREATININE 0.90 05/09/2019 07:40 AM    CREATININE 1.16 07/12/2016 03:56 PM    BUNCREATRAT 30 (H) 05/09/2019 07:40 AM      • Is eGFR less than 50ml/min  n  If YES, calculate CrCl (see back)  Any recent dehydrating illness or medications added/changed? i.e. diuretics    Drug Interactions  ASA / other antiplatelets.n  NSAID n  Other drug interactions  n (Review med list / OTCs;)  Current Outpatient Prescriptions on File Prior to Visit   Medication Sig Dispense Refill   • atorvastatin (LIPITOR) 20 MG Tab Take 1 Tab by mouth every bedtime. 90 Tab 3   • levothyroxine (SYNTHROID) 50 MCG Tab      • omega-3 acid ethyl esters (LOVAZA) 1 GM capsule      • apixaban (ELIQUIS) 5mg Tab Take 1 Tab by mouth 2 Times a Day. 180 Tab 3   • omeprazole (PRILOSEC) 40 MG delayed-release capsule Take 40 mg by mouth 2 times a day.     • vitamin D (CHOLECALCIFEROL) 1000 UNIT Tab Take 1,000 Units by mouth every day.     • Magnesium 500 MG Cap Take  by mouth.     • Levothyroxine Sodium (LEVOXYL PO) Take 0.05 mcg by mouth every day.     • Omega-3 Fatty Acids (FISH OIL) 1000 MG CAPSULE DELAYED RELEASE Take 1,000 mg by mouth 3 times a day. 90 Cap 2   • liothyronine (CYTOMEL) 5 MCG TABS Take 5 mcg by mouth every  morning.       No current facility-administered medications on file prior to visit.        Final Assessment and Recommendations:  Patient appears stable from the anticoagulation standpoint  Benefits of continued DOAC therapy outweigh risks for this patient  Recommend continue current DOAC at same dose    Follow up:  Will follow up with patient in  6 months

## 2019-07-08 DIAGNOSIS — Z01.812 PRE-OPERATIVE LABORATORY EXAMINATION: ICD-10-CM

## 2019-07-08 DIAGNOSIS — Z01.810 PRE-OPERATIVE CARDIOVASCULAR EXAMINATION: ICD-10-CM

## 2019-07-08 LAB
ANION GAP SERPL CALC-SCNC: 8 MMOL/L (ref 0–11.9)
BUN SERPL-MCNC: 23 MG/DL (ref 8–22)
CALCIUM SERPL-MCNC: 9.8 MG/DL (ref 8.5–10.5)
CHLORIDE SERPL-SCNC: 105 MMOL/L (ref 96–112)
CO2 SERPL-SCNC: 26 MMOL/L (ref 20–33)
CREAT SERPL-MCNC: 0.97 MG/DL (ref 0.5–1.4)
EKG IMPRESSION: NORMAL
ERYTHROCYTE [DISTWIDTH] IN BLOOD BY AUTOMATED COUNT: 47.7 FL (ref 35.9–50)
GLUCOSE SERPL-MCNC: 102 MG/DL (ref 65–99)
HCT VFR BLD AUTO: 39.8 % (ref 37–47)
HGB BLD-MCNC: 12.9 G/DL (ref 12–16)
MCH RBC QN AUTO: 29.9 PG (ref 27–33)
MCHC RBC AUTO-ENTMCNC: 32.4 G/DL (ref 33.6–35)
MCV RBC AUTO: 92.3 FL (ref 81.4–97.8)
PLATELET # BLD AUTO: 217 K/UL (ref 164–446)
PMV BLD AUTO: 11.3 FL (ref 9–12.9)
POTASSIUM SERPL-SCNC: 4 MMOL/L (ref 3.6–5.5)
RBC # BLD AUTO: 4.31 M/UL (ref 4.2–5.4)
SODIUM SERPL-SCNC: 139 MMOL/L (ref 135–145)
WBC # BLD AUTO: 6.1 K/UL (ref 4.8–10.8)

## 2019-07-08 PROCEDURE — 85027 COMPLETE CBC AUTOMATED: CPT

## 2019-07-08 PROCEDURE — 80048 BASIC METABOLIC PNL TOTAL CA: CPT

## 2019-07-08 PROCEDURE — 93010 ELECTROCARDIOGRAM REPORT: CPT | Performed by: INTERNAL MEDICINE

## 2019-07-08 PROCEDURE — 93005 ELECTROCARDIOGRAM TRACING: CPT

## 2019-07-08 PROCEDURE — 36415 COLL VENOUS BLD VENIPUNCTURE: CPT

## 2019-07-08 RX ORDER — LEVOTHYROXINE SODIUM 0.05 MG/1
50 TABLET ORAL
COMMUNITY
End: 2022-12-12

## 2019-07-16 ENCOUNTER — HOSPITAL ENCOUNTER (OUTPATIENT)
Facility: MEDICAL CENTER | Age: 74
End: 2019-07-17
Attending: SURGERY | Admitting: SURGERY
Payer: MEDICARE

## 2019-07-16 ENCOUNTER — ANESTHESIA EVENT (OUTPATIENT)
Dept: SURGERY | Facility: MEDICAL CENTER | Age: 74
End: 2019-07-16
Payer: MEDICARE

## 2019-07-16 ENCOUNTER — ANESTHESIA (OUTPATIENT)
Dept: SURGERY | Facility: MEDICAL CENTER | Age: 74
End: 2019-07-16
Payer: MEDICARE

## 2019-07-16 DIAGNOSIS — G89.18 POSTOPERATIVE PAIN: ICD-10-CM

## 2019-07-16 PROCEDURE — 501570 HCHG TROCAR, SEPARATOR: Performed by: SURGERY

## 2019-07-16 PROCEDURE — 700101 HCHG RX REV CODE 250: Performed by: SURGERY

## 2019-07-16 PROCEDURE — 160048 HCHG OR STATISTICAL LEVEL 1-5: Performed by: SURGERY

## 2019-07-16 PROCEDURE — A6402 STERILE GAUZE <= 16 SQ IN: HCPCS | Performed by: SURGERY

## 2019-07-16 PROCEDURE — 500521 HCHG ENDOSTITCH LOAD UNIT: Performed by: SURGERY

## 2019-07-16 PROCEDURE — A9270 NON-COVERED ITEM OR SERVICE: HCPCS | Performed by: PHYSICIAN ASSISTANT

## 2019-07-16 PROCEDURE — 500868 HCHG NEEDLE, SURGI(VARES): Performed by: SURGERY

## 2019-07-16 PROCEDURE — G0378 HOSPITAL OBSERVATION PER HR: HCPCS

## 2019-07-16 PROCEDURE — 700111 HCHG RX REV CODE 636 W/ 250 OVERRIDE (IP): Performed by: ANESTHESIOLOGY

## 2019-07-16 PROCEDURE — 501664 HCHG TUBING, FILTER STRYKER: Performed by: SURGERY

## 2019-07-16 PROCEDURE — 501838 HCHG SUTURE GENERAL: Performed by: SURGERY

## 2019-07-16 PROCEDURE — 700101 HCHG RX REV CODE 250: Performed by: ANESTHESIOLOGY

## 2019-07-16 PROCEDURE — 700102 HCHG RX REV CODE 250 W/ 637 OVERRIDE(OP): Performed by: ANESTHESIOLOGY

## 2019-07-16 PROCEDURE — A9270 NON-COVERED ITEM OR SERVICE: HCPCS | Performed by: ANESTHESIOLOGY

## 2019-07-16 PROCEDURE — 700105 HCHG RX REV CODE 258: Performed by: PHYSICIAN ASSISTANT

## 2019-07-16 PROCEDURE — 502571 HCHG PACK, LAP CHOLE: Performed by: SURGERY

## 2019-07-16 PROCEDURE — 501577 HCHG TROCAR, STEP 11MM: Performed by: SURGERY

## 2019-07-16 PROCEDURE — C1781 MESH (IMPLANTABLE): HCPCS | Performed by: SURGERY

## 2019-07-16 PROCEDURE — 500522 HCHG ENDOSTITCH SUTURING DEVICE: Performed by: SURGERY

## 2019-07-16 PROCEDURE — 160009 HCHG ANES TIME/MIN: Performed by: SURGERY

## 2019-07-16 PROCEDURE — 700102 HCHG RX REV CODE 250 W/ 637 OVERRIDE(OP): Performed by: PHYSICIAN ASSISTANT

## 2019-07-16 PROCEDURE — 700105 HCHG RX REV CODE 258: Performed by: SURGERY

## 2019-07-16 PROCEDURE — 160035 HCHG PACU - 1ST 60 MINS PHASE I: Performed by: SURGERY

## 2019-07-16 PROCEDURE — 501583 HCHG TROCAR, THRD CAN&SEAL 5X100: Performed by: SURGERY

## 2019-07-16 PROCEDURE — 502240 HCHG MISC OR SUPPLY RC 0272: Performed by: SURGERY

## 2019-07-16 PROCEDURE — 160041 HCHG SURGERY MINUTES - EA ADDL 1 MIN LEVEL 4: Performed by: SURGERY

## 2019-07-16 PROCEDURE — 160036 HCHG PACU - EA ADDL 30 MINS PHASE I: Performed by: SURGERY

## 2019-07-16 PROCEDURE — 160002 HCHG RECOVERY MINUTES (STAT): Performed by: SURGERY

## 2019-07-16 PROCEDURE — 160029 HCHG SURGERY MINUTES - 1ST 30 MINS LEVEL 4: Performed by: SURGERY

## 2019-07-16 DEVICE — MESH ALLOMAX 5CM X 8CM - RECTANGLE (1EA/CA): Type: IMPLANTABLE DEVICE | Status: FUNCTIONAL

## 2019-07-16 RX ORDER — SODIUM CHLORIDE, SODIUM LACTATE, POTASSIUM CHLORIDE, CALCIUM CHLORIDE 600; 310; 30; 20 MG/100ML; MG/100ML; MG/100ML; MG/100ML
1000 INJECTION, SOLUTION INTRAVENOUS CONTINUOUS
Status: DISCONTINUED | OUTPATIENT
Start: 2019-07-16 | End: 2019-07-17 | Stop reason: HOSPADM

## 2019-07-16 RX ORDER — MAGNESIUM SULFATE HEPTAHYDRATE 40 MG/ML
INJECTION, SOLUTION INTRAVENOUS PRN
Status: DISCONTINUED | OUTPATIENT
Start: 2019-07-16 | End: 2019-07-16 | Stop reason: SURG

## 2019-07-16 RX ORDER — ACETAMINOPHEN 500 MG
1000 TABLET ORAL ONCE
Status: COMPLETED | OUTPATIENT
Start: 2019-07-16 | End: 2019-07-16

## 2019-07-16 RX ORDER — HALOPERIDOL 5 MG/ML
1 INJECTION INTRAMUSCULAR
Status: DISCONTINUED | OUTPATIENT
Start: 2019-07-16 | End: 2019-07-16 | Stop reason: HOSPADM

## 2019-07-16 RX ORDER — SODIUM CHLORIDE, SODIUM LACTATE, POTASSIUM CHLORIDE, CALCIUM CHLORIDE 600; 310; 30; 20 MG/100ML; MG/100ML; MG/100ML; MG/100ML
INJECTION, SOLUTION INTRAVENOUS CONTINUOUS
Status: ACTIVE | OUTPATIENT
Start: 2019-07-16 | End: 2019-07-17

## 2019-07-16 RX ORDER — DIPHENHYDRAMINE HYDROCHLORIDE 50 MG/ML
12.5 INJECTION INTRAMUSCULAR; INTRAVENOUS
Status: DISCONTINUED | OUTPATIENT
Start: 2019-07-16 | End: 2019-07-16 | Stop reason: HOSPADM

## 2019-07-16 RX ORDER — SCOLOPAMINE TRANSDERMAL SYSTEM 1 MG/1
1 PATCH, EXTENDED RELEASE TRANSDERMAL
Status: DISCONTINUED | OUTPATIENT
Start: 2019-07-16 | End: 2019-07-17 | Stop reason: HOSPADM

## 2019-07-16 RX ORDER — OXYCODONE HCL 5 MG/5 ML
5 SOLUTION, ORAL ORAL
Status: COMPLETED | OUTPATIENT
Start: 2019-07-16 | End: 2019-07-16

## 2019-07-16 RX ORDER — HYDROMORPHONE HYDROCHLORIDE 1 MG/ML
0.1 INJECTION, SOLUTION INTRAMUSCULAR; INTRAVENOUS; SUBCUTANEOUS
Status: DISCONTINUED | OUTPATIENT
Start: 2019-07-16 | End: 2019-07-16 | Stop reason: HOSPADM

## 2019-07-16 RX ORDER — ONDANSETRON 4 MG/1
4 TABLET, ORALLY DISINTEGRATING ORAL EVERY 4 HOURS PRN
Status: DISCONTINUED | OUTPATIENT
Start: 2019-07-16 | End: 2019-07-17 | Stop reason: HOSPADM

## 2019-07-16 RX ORDER — MIDAZOLAM HYDROCHLORIDE 1 MG/ML
1 INJECTION INTRAMUSCULAR; INTRAVENOUS
Status: DISCONTINUED | OUTPATIENT
Start: 2019-07-16 | End: 2019-07-16 | Stop reason: HOSPADM

## 2019-07-16 RX ORDER — DIPHENHYDRAMINE HYDROCHLORIDE 50 MG/ML
25 INJECTION INTRAMUSCULAR; INTRAVENOUS EVERY 6 HOURS PRN
Status: DISCONTINUED | OUTPATIENT
Start: 2019-07-16 | End: 2019-07-17 | Stop reason: HOSPADM

## 2019-07-16 RX ORDER — ONDANSETRON 2 MG/ML
INJECTION INTRAMUSCULAR; INTRAVENOUS PRN
Status: DISCONTINUED | OUTPATIENT
Start: 2019-07-16 | End: 2019-07-16 | Stop reason: SURG

## 2019-07-16 RX ORDER — OXYCODONE HCL 5 MG/5 ML
10 SOLUTION, ORAL ORAL
Status: COMPLETED | OUTPATIENT
Start: 2019-07-16 | End: 2019-07-16

## 2019-07-16 RX ORDER — CEFAZOLIN SODIUM 1 G/3ML
INJECTION, POWDER, FOR SOLUTION INTRAMUSCULAR; INTRAVENOUS PRN
Status: DISCONTINUED | OUTPATIENT
Start: 2019-07-16 | End: 2019-07-16 | Stop reason: SURG

## 2019-07-16 RX ORDER — MEPERIDINE HYDROCHLORIDE 25 MG/ML
12.5 INJECTION INTRAMUSCULAR; INTRAVENOUS; SUBCUTANEOUS
Status: DISCONTINUED | OUTPATIENT
Start: 2019-07-16 | End: 2019-07-16 | Stop reason: HOSPADM

## 2019-07-16 RX ORDER — SODIUM CHLORIDE, SODIUM LACTATE, POTASSIUM CHLORIDE, CALCIUM CHLORIDE 600; 310; 30; 20 MG/100ML; MG/100ML; MG/100ML; MG/100ML
INJECTION, SOLUTION INTRAVENOUS CONTINUOUS
Status: DISCONTINUED | OUTPATIENT
Start: 2019-07-16 | End: 2019-07-16 | Stop reason: HOSPADM

## 2019-07-16 RX ORDER — ONDANSETRON 2 MG/ML
4 INJECTION INTRAMUSCULAR; INTRAVENOUS
Status: DISCONTINUED | OUTPATIENT
Start: 2019-07-16 | End: 2019-07-16 | Stop reason: HOSPADM

## 2019-07-16 RX ORDER — DIPHENHYDRAMINE HCL 25 MG
25 TABLET ORAL EVERY 6 HOURS PRN
Status: DISCONTINUED | OUTPATIENT
Start: 2019-07-16 | End: 2019-07-17 | Stop reason: HOSPADM

## 2019-07-16 RX ORDER — ENALAPRILAT 1.25 MG/ML
2.5 INJECTION INTRAVENOUS EVERY 6 HOURS PRN
Status: DISCONTINUED | OUTPATIENT
Start: 2019-07-16 | End: 2019-07-17 | Stop reason: HOSPADM

## 2019-07-16 RX ORDER — BUPIVACAINE HYDROCHLORIDE AND EPINEPHRINE 5; 5 MG/ML; UG/ML
INJECTION, SOLUTION EPIDURAL; INTRACAUDAL; PERINEURAL
Status: DISCONTINUED | OUTPATIENT
Start: 2019-07-16 | End: 2019-07-16 | Stop reason: HOSPADM

## 2019-07-16 RX ORDER — KETOROLAC TROMETHAMINE 30 MG/ML
15 INJECTION, SOLUTION INTRAMUSCULAR; INTRAVENOUS EVERY 6 HOURS PRN
Status: DISCONTINUED | OUTPATIENT
Start: 2019-07-16 | End: 2019-07-17 | Stop reason: HOSPADM

## 2019-07-16 RX ORDER — ATORVASTATIN CALCIUM 20 MG/1
20 TABLET, FILM COATED ORAL
Status: DISCONTINUED | OUTPATIENT
Start: 2019-07-16 | End: 2019-07-17 | Stop reason: HOSPADM

## 2019-07-16 RX ORDER — LIOTHYRONINE SODIUM 5 UG/1
5 TABLET ORAL EVERY MORNING
Status: DISCONTINUED | OUTPATIENT
Start: 2019-07-17 | End: 2019-07-17 | Stop reason: HOSPADM

## 2019-07-16 RX ORDER — MORPHINE SULFATE 10 MG/ML
1-5 INJECTION, SOLUTION INTRAMUSCULAR; INTRAVENOUS
Status: DISCONTINUED | OUTPATIENT
Start: 2019-07-16 | End: 2019-07-17 | Stop reason: HOSPADM

## 2019-07-16 RX ORDER — LORAZEPAM 2 MG/ML
.5-1 INJECTION INTRAMUSCULAR EVERY 4 HOURS PRN
Status: DISCONTINUED | OUTPATIENT
Start: 2019-07-16 | End: 2019-07-17 | Stop reason: HOSPADM

## 2019-07-16 RX ORDER — HYDROMORPHONE HYDROCHLORIDE 1 MG/ML
0.2 INJECTION, SOLUTION INTRAMUSCULAR; INTRAVENOUS; SUBCUTANEOUS
Status: DISCONTINUED | OUTPATIENT
Start: 2019-07-16 | End: 2019-07-16 | Stop reason: HOSPADM

## 2019-07-16 RX ORDER — LABETALOL HYDROCHLORIDE 5 MG/ML
5 INJECTION, SOLUTION INTRAVENOUS
Status: DISCONTINUED | OUTPATIENT
Start: 2019-07-16 | End: 2019-07-16 | Stop reason: HOSPADM

## 2019-07-16 RX ORDER — HYDROMORPHONE HYDROCHLORIDE 1 MG/ML
0.4 INJECTION, SOLUTION INTRAMUSCULAR; INTRAVENOUS; SUBCUTANEOUS
Status: DISCONTINUED | OUTPATIENT
Start: 2019-07-16 | End: 2019-07-16 | Stop reason: HOSPADM

## 2019-07-16 RX ORDER — HYDRALAZINE HYDROCHLORIDE 20 MG/ML
10 INJECTION INTRAMUSCULAR; INTRAVENOUS EVERY 6 HOURS PRN
Status: DISCONTINUED | OUTPATIENT
Start: 2019-07-16 | End: 2019-07-17 | Stop reason: HOSPADM

## 2019-07-16 RX ORDER — ONDANSETRON 2 MG/ML
4 INJECTION INTRAMUSCULAR; INTRAVENOUS EVERY 4 HOURS PRN
Status: DISCONTINUED | OUTPATIENT
Start: 2019-07-16 | End: 2019-07-17 | Stop reason: HOSPADM

## 2019-07-16 RX ORDER — NEOSTIGMINE METHYLSULFATE 1 MG/ML
INJECTION, SOLUTION INTRAVENOUS PRN
Status: DISCONTINUED | OUTPATIENT
Start: 2019-07-16 | End: 2019-07-16 | Stop reason: SURG

## 2019-07-16 RX ORDER — PHENYLEPHRINE HYDROCHLORIDE 10 MG/ML
INJECTION, SOLUTION INTRAMUSCULAR; INTRAVENOUS; SUBCUTANEOUS PRN
Status: DISCONTINUED | OUTPATIENT
Start: 2019-07-16 | End: 2019-07-16 | Stop reason: SURG

## 2019-07-16 RX ORDER — LEVOTHYROXINE SODIUM 0.05 MG/1
50 TABLET ORAL
Status: DISCONTINUED | OUTPATIENT
Start: 2019-07-17 | End: 2019-07-17 | Stop reason: HOSPADM

## 2019-07-16 RX ADMIN — FENTANYL CITRATE 100 MCG: 50 INJECTION, SOLUTION INTRAMUSCULAR; INTRAVENOUS at 16:20

## 2019-07-16 RX ADMIN — SODIUM CHLORIDE, POTASSIUM CHLORIDE, SODIUM LACTATE AND CALCIUM CHLORIDE 1000 ML: 600; 310; 30; 20 INJECTION, SOLUTION INTRAVENOUS at 14:10

## 2019-07-16 RX ADMIN — LIDOCAINE HYDROCHLORIDE 0.5 ML: 10 INJECTION, SOLUTION EPIDURAL; INFILTRATION; INTRACAUDAL at 14:09

## 2019-07-16 RX ADMIN — FENTANYL CITRATE 50 MCG: 0.05 INJECTION, SOLUTION INTRAMUSCULAR; INTRAVENOUS at 19:00

## 2019-07-16 RX ADMIN — ACETAMINOPHEN 1000 MG: 500 TABLET ORAL at 14:09

## 2019-07-16 RX ADMIN — FENTANYL CITRATE 50 MCG: 0.05 INJECTION, SOLUTION INTRAMUSCULAR; INTRAVENOUS at 18:54

## 2019-07-16 RX ADMIN — PHENYLEPHRINE HYDROCHLORIDE 200 MCG: 10 INJECTION INTRAVENOUS at 16:55

## 2019-07-16 RX ADMIN — ROCURONIUM BROMIDE 40 MG: 10 INJECTION, SOLUTION INTRAVENOUS at 16:20

## 2019-07-16 RX ADMIN — NEOSTIGMINE METHYLSULFATE 5 MG: 1 INJECTION INTRAVENOUS at 17:49

## 2019-07-16 RX ADMIN — FENTANYL CITRATE 50 MCG: 50 INJECTION, SOLUTION INTRAMUSCULAR; INTRAVENOUS at 17:35

## 2019-07-16 RX ADMIN — ONDANSETRON 4 MG: 2 INJECTION INTRAMUSCULAR; INTRAVENOUS at 17:45

## 2019-07-16 RX ADMIN — MIDAZOLAM HYDROCHLORIDE 1 MG: 1 INJECTION, SOLUTION INTRAMUSCULAR; INTRAVENOUS at 18:10

## 2019-07-16 RX ADMIN — GLYCOPYRROLATE 1 MG: 0.2 INJECTION INTRAMUSCULAR; INTRAVENOUS at 17:49

## 2019-07-16 RX ADMIN — PHENYLEPHRINE HYDROCHLORIDE 200 MCG: 10 INJECTION INTRAVENOUS at 16:35

## 2019-07-16 RX ADMIN — PROPOFOL 160 MCG/KG/MIN: 10 INJECTION, EMULSION INTRAVENOUS at 16:20

## 2019-07-16 RX ADMIN — PROPOFOL 150 MG: 10 INJECTION, EMULSION INTRAVENOUS at 16:20

## 2019-07-16 RX ADMIN — OXYCODONE HYDROCHLORIDE 10 MG: 5 SOLUTION ORAL at 18:36

## 2019-07-16 RX ADMIN — SODIUM CHLORIDE, POTASSIUM CHLORIDE, SODIUM LACTATE AND CALCIUM CHLORIDE: 600; 310; 30; 20 INJECTION, SOLUTION INTRAVENOUS at 16:14

## 2019-07-16 RX ADMIN — GLYCOPYRROLATE 0.2 MG: 0.2 INJECTION INTRAMUSCULAR; INTRAVENOUS at 16:39

## 2019-07-16 RX ADMIN — FENTANYL CITRATE 50 MCG: 50 INJECTION, SOLUTION INTRAMUSCULAR; INTRAVENOUS at 17:00

## 2019-07-16 RX ADMIN — MAGNESIUM SULFATE IN WATER 4 G: 40 INJECTION, SOLUTION INTRAVENOUS at 16:35

## 2019-07-16 RX ADMIN — PHENYLEPHRINE HYDROCHLORIDE 200 MCG: 10 INJECTION INTRAVENOUS at 17:25

## 2019-07-16 RX ADMIN — ATORVASTATIN CALCIUM 20 MG: 20 TABLET, FILM COATED ORAL at 22:04

## 2019-07-16 RX ADMIN — SODIUM CHLORIDE, POTASSIUM CHLORIDE, SODIUM LACTATE AND CALCIUM CHLORIDE 1000 ML: 600; 310; 30; 20 INJECTION, SOLUTION INTRAVENOUS at 22:10

## 2019-07-16 RX ADMIN — FENTANYL CITRATE 50 MCG: 0.05 INJECTION, SOLUTION INTRAMUSCULAR; INTRAVENOUS at 18:41

## 2019-07-16 RX ADMIN — FENTANYL CITRATE 50 MCG: 0.05 INJECTION, SOLUTION INTRAMUSCULAR; INTRAVENOUS at 18:36

## 2019-07-16 RX ADMIN — CEFAZOLIN 2 G: 330 INJECTION, POWDER, FOR SOLUTION INTRAMUSCULAR; INTRAVENOUS at 16:14

## 2019-07-16 ASSESSMENT — LIFESTYLE VARIABLES
EVER_SMOKED: YES
EVER FELT BAD OR GUILTY ABOUT YOUR DRINKING: NO
TOTAL SCORE: 0
AVERAGE NUMBER OF DAYS PER WEEK YOU HAVE A DRINK CONTAINING ALCOHOL: 2
TOTAL SCORE: 0
HAVE PEOPLE ANNOYED YOU BY CRITICIZING YOUR DRINKING: NO
TOTAL SCORE: 0
EVER HAD A DRINK FIRST THING IN THE MORNING TO STEADY YOUR NERVES TO GET RID OF A HANGOVER: NO
HOW MANY TIMES IN THE PAST YEAR HAVE YOU HAD 5 OR MORE DRINKS IN A DAY: 0
HAVE YOU EVER FELT YOU SHOULD CUT DOWN ON YOUR DRINKING: NO
ON A TYPICAL DAY WHEN YOU DRINK ALCOHOL HOW MANY DRINKS DO YOU HAVE: 2
ALCOHOL_USE: YES
CONSUMPTION TOTAL: NEGATIVE

## 2019-07-16 ASSESSMENT — COPD QUESTIONNAIRES
IN THE PAST 12 MONTHS DO YOU DO LESS THAN YOU USED TO BECAUSE OF YOUR BREATHING PROBLEMS: DISAGREE/UNSURE
COPD SCREENING SCORE: 2
HAVE YOU SMOKED AT LEAST 100 CIGARETTES IN YOUR ENTIRE LIFE: NO/DON'T KNOW
DO YOU EVER COUGH UP ANY MUCUS OR PHLEGM?: NO/ONLY WITH OCCASIONAL COLDS OR INFECTIONS
DURING THE PAST 4 WEEKS HOW MUCH DID YOU FEEL SHORT OF BREATH: NONE/LITTLE OF THE TIME

## 2019-07-16 ASSESSMENT — COGNITIVE AND FUNCTIONAL STATUS - GENERAL
WALKING IN HOSPITAL ROOM: A LITTLE
MOBILITY SCORE: 21
TURNING FROM BACK TO SIDE WHILE IN FLAT BAD: A LITTLE
HELP NEEDED FOR BATHING: A LITTLE
DRESSING REGULAR LOWER BODY CLOTHING: A LITTLE
CLIMB 3 TO 5 STEPS WITH RAILING: A LITTLE
SUGGESTED CMS G CODE MODIFIER MOBILITY: CJ
SUGGESTED CMS G CODE MODIFIER DAILY ACTIVITY: CJ
DAILY ACTIVITIY SCORE: 22

## 2019-07-16 ASSESSMENT — PATIENT HEALTH QUESTIONNAIRE - PHQ9
1. LITTLE INTEREST OR PLEASURE IN DOING THINGS: NOT AT ALL
2. FEELING DOWN, DEPRESSED, IRRITABLE, OR HOPELESS: NOT AT ALL
SUM OF ALL RESPONSES TO PHQ9 QUESTIONS 1 AND 2: 0

## 2019-07-16 ASSESSMENT — PAIN SCALES - GENERAL: PAIN_LEVEL: 3

## 2019-07-16 NOTE — ANESTHESIA PREPROCEDURE EVALUATION
Relevant Problems   (+) Hiatal hernia   (+) Hypothyroidism   (+) TIA (transient ischemic attack)       Physical Exam    Airway   Mallampati: II  TM distance: >3 FB  Neck ROM: full       Cardiovascular - normal exam  Rhythm: regular  Rate: normal  (-) murmur     Dental - normal exam         Pulmonary - normal exam  Breath sounds clear to auscultation     Abdominal    Neurological - normal exam                 Anesthesia Plan    ASA 3       Plan - general                       Informed Consent:        tomer jo

## 2019-07-16 NOTE — ANESTHESIA PROCEDURE NOTES
Airway  Date/Time: 7/16/2019 4:22 PM  Performed by: AMRITA WOMACK  Authorized by: AMRITA WOMACK     Location:  OR  Urgency:  Elective  Indications for Airway Management:  Anesthesia  Spontaneous Ventilation: absent    Sedation Level:  Deep  Preoxygenated: Yes    Patient Position:  Sniffing  Mask Difficulty Assessment:  1 - vent by mask  Final Airway Type:  Endotracheal airway  Final Endotracheal Airway:  ETT  Cuffed: Yes    Technique Used for Successful ETT Placement:  Direct laryngoscopy  Insertion Site:  Oral  Blade Type:  Damaris  Laryngoscope Blade/Videolaryngoscope Blade Size:  3  Measured from:  Teeth  Placement Verified by: auscultation and capnometry    Cormack-Lehane Classification:  Grade IIa - partial view of glottis  Number of Attempts at Approach:  1

## 2019-07-17 VITALS
RESPIRATION RATE: 18 BRPM | OXYGEN SATURATION: 91 % | TEMPERATURE: 98.3 F | WEIGHT: 191.8 LBS | HEART RATE: 55 BPM | BODY MASS INDEX: 28.41 KG/M2 | HEIGHT: 69 IN | SYSTOLIC BLOOD PRESSURE: 127 MMHG | DIASTOLIC BLOOD PRESSURE: 67 MMHG

## 2019-07-17 PROCEDURE — 96372 THER/PROPH/DIAG INJ SC/IM: CPT

## 2019-07-17 PROCEDURE — 700111 HCHG RX REV CODE 636 W/ 250 OVERRIDE (IP): Performed by: PHYSICIAN ASSISTANT

## 2019-07-17 PROCEDURE — A9270 NON-COVERED ITEM OR SERVICE: HCPCS | Performed by: PHYSICIAN ASSISTANT

## 2019-07-17 PROCEDURE — 700102 HCHG RX REV CODE 250 W/ 637 OVERRIDE(OP): Performed by: PHYSICIAN ASSISTANT

## 2019-07-17 PROCEDURE — 96374 THER/PROPH/DIAG INJ IV PUSH: CPT

## 2019-07-17 PROCEDURE — G0378 HOSPITAL OBSERVATION PER HR: HCPCS

## 2019-07-17 RX ADMIN — KETOROLAC TROMETHAMINE 15 MG: 30 INJECTION, SOLUTION INTRAMUSCULAR at 08:52

## 2019-07-17 RX ADMIN — ENOXAPARIN SODIUM 40 MG: 100 INJECTION SUBCUTANEOUS at 08:33

## 2019-07-17 RX ADMIN — LIOTHYRONINE SODIUM 5 MCG: 5 TABLET ORAL at 05:36

## 2019-07-17 RX ADMIN — LEVOTHYROXINE SODIUM 50 MCG: 50 TABLET ORAL at 05:36

## 2019-07-17 RX ADMIN — FAMOTIDINE 20 MG: 10 INJECTION INTRAVENOUS at 05:37

## 2019-07-17 RX ADMIN — HYDROCODONE BITARTRATE AND ACETAMINOPHEN 15 ML: 7.5; 325 SOLUTION ORAL at 13:17

## 2019-07-17 RX ADMIN — HYDROCODONE BITARTRATE AND ACETAMINOPHEN 10 ML: 7.5; 325 SOLUTION ORAL at 05:35

## 2019-07-17 ASSESSMENT — LIFESTYLE VARIABLES: EVER_SMOKED: YES

## 2019-07-17 ASSESSMENT — COPD QUESTIONNAIRES
DO YOU EVER COUGH UP ANY MUCUS OR PHLEGM?: NO/ONLY WITH OCCASIONAL COLDS OR INFECTIONS
HAVE YOU SMOKED AT LEAST 100 CIGARETTES IN YOUR ENTIRE LIFE: YES
DURING THE PAST 4 WEEKS HOW MUCH DID YOU FEEL SHORT OF BREATH: NONE/LITTLE OF THE TIME
COPD SCREENING SCORE: 4

## 2019-07-17 NOTE — DISCHARGE INSTRUCTIONS
Discharge Instructions    Discharged to home by car with relative. Discharged via wheelchair, hospital escort: Yes.  Special equipment needed: Not Applicable    Be sure to schedule a follow-up appointment with your primary care doctor or any specialists as instructed.     Discharge Plan:   Influenza Vaccine Indication: Patient Refuses    I understand that a diet low in cholesterol, fat, and sodium is recommended for good health. Unless I have been given specific instructions below for another diet, I accept this instruction as my diet prescription.       Special Instructions: None    · Is patient discharged on Warfarin / Coumadin?   No     Discharge instructions  Follow diet handout  May shower over Tegaderms.   Ok to remove Tegaderms on . May continue to shower once bandages removed, but no baths/soaks x 2 weeks.  No driving for 4-5 days.  No lifting >15 lbs for 3-4 weeks.  F/U with Dr. Ganser in 1-2 weeks    Hiatal Hernia  A hiatal hernia occurs when part of your stomach slides above the muscle that separates your abdomen from your chest (diaphragm). You can be born with a hiatal hernia (congenital), or it may develop over time. In almost all cases of hiatal hernia, only the top part of the stomach pushes through.   Many people have a hiatal hernia with no symptoms. The larger the hernia, the more likely that you will have symptoms. In some cases, a hiatal hernia allows stomach acid to flow back into the tube that carries food from your mouth to your stomach (esophagus). This may cause heartburn symptoms. Severe heartburn symptoms may mean you have developed a condition called gastroesophageal reflux disease (GERD).   CAUSES   Hiatal hernias are caused by a weakness in the opening (hiatus) where your esophagus passes through your diaphragm to attach to the upper part of your stomach. You may be born with a weakness in your hiatus, or a weakness can develop.  RISK FACTORS  Older age is a major risk factor for a  hiatal hernia. Anything that increases pressure on your diaphragm can also increase your risk of a hiatal hernia. This includes:  · Pregnancy.  · Excess weight.  · Frequent constipation.  SIGNS AND SYMPTOMS   People with a hiatal hernia often have no symptoms. If symptoms develop, they are almost always caused by GERD. They may include:  · Heartburn.  · Belching.  · Indigestion.  · Trouble swallowing.  · Coughing or wheezing.   · Sore throat.  · Hoarseness.  · Chest pain.  DIAGNOSIS   A hiatal hernia is sometimes found during an exam for another problem. Your health care provider may suspect a hiatal hernia if you have symptoms of GERD. Tests may be done to diagnose GERD. These may include:  · X-rays of your stomach or chest.  · An upper gastrointestinal (GI) series. This is an X-ray exam of your GI tract involving the use of a chalky liquid that you swallow. The liquid shows up clearly on the X-ray.  · Endoscopy. This is a procedure to look into your stomach using a thin, flexible tube that has a tiny camera and light on the end of it.  TREATMENT   If you have no symptoms, you may not need treatment. If you have symptoms, treatment may include:  · Dietary and lifestyle changes to help reduce GERD symptoms.  · Medicines. These may include:  ¨ Over-the-counter antacids.  ¨ Medicines that make your stomach empty more quickly.  ¨ Medicines that block the production of stomach acid (H2 blockers).  ¨ Stronger medicines to reduce stomach acid (proton pump inhibitors).  · You may need surgery to repair the hernia if other treatments are not helping.  HOME CARE INSTRUCTIONS   · Take all medicines as directed by your health care provider.  · Quit smoking, if you smoke.  · Try to achieve and maintain a healthy body weight.  · Eat frequent small meals instead of three large meals a day. This keeps your stomach from getting too full.  ¨ Eat slowly.  ¨ Do not lie down right after eating.   ¨ Do not eat 1-2 hours before bed.     · Do not drink beverages with caffeine. These include cola, coffee, cocoa, and tea.  · Do not drink alcohol.  · Avoid foods that can make symptoms of GERD worse. These may include:  ¨ Fatty foods.  ¨ Citrus fruits.  ¨ Other foods and drinks that contain acid.  · Avoid putting pressure on your belly. Anything that puts pressure on your belly increases the amount of acid that may be pushed up into your esophagus.    ¨ Avoid bending over, especially after eating.  ¨ Raise the head of your bed by putting blocks under the legs. This keeps your head and esophagus higher than your stomach.  ¨ Do not wear tight clothing around your chest or stomach.  ¨ Try not to strain when having a bowel movement, when urinating, or when lifting heavy objects.  SEEK MEDICAL CARE IF:  · Your symptoms are not controlled with medicines or lifestyle changes.  · You are having trouble swallowing.  · You have coughing or wheezing that will not go away.  SEEK IMMEDIATE MEDICAL CARE IF:  · Your pain is getting worse.  · Your pain spreads to your arms, neck, jaw, teeth, or back.  · You have shortness of breath.  · You sweat for no reason.  · You feel sick to your stomach (nauseous) or vomit.  · You vomit blood.  · You have bright red blood in your stools.  · You have black, tarry stools.    This information is not intended to replace advice given to you by your health care provider. Make sure you discuss any questions you have with your health care provider.  Document Released: 03/09/2005 Document Revised: 04/10/2017 Document Reviewed: 12/05/2014  [employer] Interactive Patient Education © 2017 [employer]    Toupet Fundoplication  Toupet fundoplication is a procedure to treat serious symptoms of gastroesophageal reflux disease (GERD). In GERD, food and stomach acid flow back up from your stomach through your swallowing tube (esophagus). You may need fundoplication if other treatments have not worked. This is especially true if you also have a  condition in which the muscles that connect the esophagus to the stomach do not close completely (hiatal hernia). A hiatal hernia can make GERD symptoms worse.  In this procedure, the upper part of the stomach is wrapped around the lower part of the esophagus and then stitched in place. This gives the esophagus more flexibility to grow and move. A hiatal hernia can also be repaired as part of fundoplication.  Tell a health care provider about:  · Any allergies you have.  · All medicines you are taking, including vitamins, herbs, eye drops, creams, and over-the-counter medicines.  · Any problems you or family members have had with anesthetic medicines.  · Any blood disorders you have.  · Any surgeries you have had.  · Any medical conditions you have.  What are the risks?  Generally, this is a safe procedure. However, problems can occur and include:  · Bleeding and infection.  · Damage to the digestive system. This is rare.  · Breathing problems. These are rare.  What happens before the procedure?  · You may need to have tests before this procedure. These include:  ¨ Blood tests.  ¨ Tests of your esophagus to check the pressure (manometry) and to measure how much acid is coming up from your stomach (pH monitoring).  · Do not eat or drink anything after midnight on the night before the procedure or as directed by your health care provider.  · Ask your health care provider about:  ¨ Changing or stopping your regular medicines. This is especially important if you are taking diabetes medicines or blood thinners.  ¨ Taking medicines such as aspirin and ibuprofen. These medicines can thin your blood. Do not take these medicines before your procedure if your health care provider instructs you not to.  ¨ Supplements that you are taking. You may need to stop taking these before your procedure, especially vitamin E.  What happens during the procedure?  · An IV tube will be started in a vein. Medicine will flow through this tube  directly into your body.  · You will be given a medicine that makes you go to sleep (general anesthetic).  · The type of incision that your surgeon will make depends on the type of fundoplication that you will be having:  ¨ Open repair requires one long surgical cut (incision) in your belly (abdomen), and it gives the surgeon access to work directly inside your stomach cavity. Your surgeon will wrap the upper portion of your stomach around the lower portion of your esophagus and stitch them together.  ¨ Laparoscopic fundoplication requires several small incisions in the abdomen. Through the incisions, the surgeon will insert a thin tube with a camera on the end (laparoscope) and the instruments that will be used to perform the procedure. Then the surgeon will wrap the upper portion of your stomach around the lower portion of your esophagus and stitch them together.  ¨ Endoluminal fundoplication does not require any incisions. The instruments will be inserted through your mouth, and clips will be used to connect your esophagus and your stomach.  · The surgeon may put in a gastrostomy tube (G-tube) to release air from your stomach and help you with eating after surgery.  · If your surgeon made any incisions, they will be stitched closed (sutured) and covered with a bandage (dressing).  What happens after the procedure?  · If you did not get a G-tube, you may have a tube inserted through your nose to release gas from your stomach.  · You can slowly start to eat regular food again. You may have to start gradually by beginning with liquids and soft foods.  This information is not intended to replace advice given to you by your health care provider. Make sure you discuss any questions you have with your health care provider.  Document Released: 10/02/2015 Document Revised: 08/21/2017 Document Reviewed: 05/22/2015  [employer] Interactive Patient Education © 2017 [employer]      Incision Care, Adult  An incision is a  surgical cut that is made through your skin. Most incisions are closed after surgery. Your incision may be closed with stitches (sutures), staples, skin glue, or adhesive strips. You may need to return to your health care provider to have sutures or staples removed. This may occur several days to several weeks after your surgery. The incision needs to be cared for properly to prevent infection.  How to care for your incision  Incision care   · Follow instructions from your health care provider about how to take care of your incision. Make sure you:  ¨ Wash your hands with soap and water before you change the bandage (dressing). If soap and water are not available, use hand .  ¨ Change your dressing as told by your health care provider.  ¨ Leave sutures, skin glue, or adhesive strips in place. These skin closures may need to stay in place for 2 weeks or longer. If adhesive strip edges start to loosen and curl up, you may trim the loose edges. Do not remove adhesive strips completely unless your health care provider tells you to do that.  · Check your incision area every day for signs of infection. Check for:  ¨ More redness, swelling, or pain.  ¨ More fluid or blood.  ¨ Warmth.  ¨ Pus or a bad smell.  · Ask your health care provider how to clean the incision. This may include:  ¨ Using mild soap and water.  ¨ Using a clean towel to pat the incision dry after cleaning it.  ¨ Applying a cream or ointment. Do this only as told by your health care provider.  ¨ Covering the incision with a clean dressing.  · Ask your health care provider when you can leave the incision uncovered.  · Do not take baths, swim, or use a hot tub until your health care provider approves. Ask your health care provider if you can take showers. You may only be allowed to take sponge baths for bathing.  Medicines  · If you were prescribed an antibiotic medicine, cream, or ointment, take or apply the antibiotic as told by your health care  provider. Do not stop taking or applying the antibiotic even if your condition improves.  · Take over-the-counter and prescription medicines only as told by your health care provider.  General instructions  · Limit movement around your incision to improve healing.  ¨ Avoid straining, lifting, or exercise for the first month, or for as long as told by your health care provider.  ¨ Follow instructions from your health care provider about returning to your normal activities.  ¨ Ask your health care provider what activities are safe.  · Protect your incision from the sun when you are outside for the first 6 months, or for as long as told by your health care provider. Apply sunscreen around the scar or cover it up.  · Keep all follow-up visits as told by your health care provider. This is important.  Contact a health care provider if:  · Your have more redness, swelling, or pain around the incision.  · You have more fluid or blood coming from the incision.  · Your incision feels warm to the touch.  · You have pus or a bad smell coming from the incision.  · You have a fever or shaking chills.  · You are nauseous or you vomit.  · You are dizzy.  · Your sutures or staples come undone.  Get help right away if:  · You have a red streak coming from your incision.  · Your incision bleeds through the dressing and the bleeding does not stop with gentle pressure.  · The edges of your incision open up and separate.  · You have severe pain.  · You have a rash.  · You are confused.  · You faint.  · You have trouble breathing and a fast heartbeat.  This information is not intended to replace advice given to you by your health care provider. Make sure you discuss any questions you have with your health care provider.  Document Released: 07/07/2006 Document Revised: 08/25/2017 Document Reviewed: 07/05/2017  [employer] Interactive Patient Education © 2017 [employer]      Depression / Suicide Risk    As you are discharged from this  [facility] facility, it is important to learn how to keep safe from harming yourself.    Recognize the warning signs:  · Abrupt changes in personality, positive or negative- including increase in energy   · Giving away possessions  · Change in eating patterns- significant weight changes-  positive or negative  · Change in sleeping patterns- unable to sleep or sleeping all the time   · Unwillingness or inability to communicate  · Depression  · Unusual sadness, discouragement and loneliness  · Talk of wanting to die  · Neglect of personal appearance   · Rebelliousness- reckless behavior  · Withdrawal from people/activities they love  · Confusion- inability to concentrate     If you or a loved one observes any of these behaviors or has concerns about self-harm, here's what you can do:  · Talk about it- your feelings and reasons for harming yourself  · Remove any means that you might use to hurt yourself (examples: pills, rope, extension cords, firearm)  · Get professional help from the community (Mental Health, Substance Abuse, psychological counseling)  · Do not be alone:Call your Safe Contact- someone whom you trust who will be there for you.  · Call your local CRISIS HOTLINE 500-6265 or 619-261-5576  · Call your local Children's Mobile Crisis Response Team [place] (717) 527-2555 or www.[employer]  · Call the toll free National Suicide Prevention Hotlines   · National Suicide Prevention Lifeline 071-027-BVLH (7594)  · National Hope Line Network 800-SUICIDE (571-2960)

## 2019-07-17 NOTE — PROGRESS NOTES
"Progress Note:    S: Doing well  Hansa PO  Pain controlled    O:              Current Facility-Administered Medications   Medication Dose   • atorvastatin (LIPITOR) tablet 20 mg  20 mg   • levothyroxine (SYNTHROID) tablet 50 mcg  50 mcg   • liothyronine (CYTOMEL) tablet 5 mcg  5 mcg   • lactated ringers infusion  1,000 mL   • enoxaparin (LOVENOX) inj 40 mg  40 mg   • Pharmacy Consult Request ...Pain Management Review 1 Each  1 Each   • ondansetron (ZOFRAN) syringe/vial injection 4 mg  4 mg   • diphenhydrAMINE (BENADRYL) injection 25 mg  25 mg   • scopolamine (TRANSDERM-SCOP) patch 1 Patch  1 Patch   • diphenhydrAMINE (BENADRYL) tablet/capsule 25 mg  25 mg    Or   • diphenhydrAMINE (BENADRYL) injection 25 mg  25 mg   • benzocaine-menthol (CEPACOL) lozenge 1 Lozenge  1 Lozenge   • Respiratory Care per Protocol     • ketorolac (TORADOL) injection 15 mg  15 mg   • enalaprilat (VASOTEC) injection 2.5 mg  2.5 mg   • famotidine (PEPCID) injection 20 mg  20 mg   • hydrALAZINE (APRESOLINE) injection 10 mg  10 mg   • HYDROcodone-acetaminophen 2.5-108 mg/5mL (HYCET) solution 10-20 mL  10-20 mL   • LORazepam (ATIVAN) injection 0.5-1 mg  0.5-1 mg   • morphine (pf) 10 mg/mL injection 1-5 mg  1-5 mg   • ondansetron (ZOFRAN ODT) dispertab 4 mg  4 mg       PE:  /67   Pulse (!) 55   Temp 36.8 °C (98.3 °F) (Temporal)   Resp 18   Ht 1.753 m (5' 9\")   Wt 87 kg (191 lb 12.8 oz)   SpO2 91%     Intake/Output Summary (Last 24 hours) at 07/17/19 1325  Last data filed at 07/17/19 0800   Gross per 24 hour   Intake          3383.98 ml   Output               50 ml   Net          3333.98 ml       Abd soft, wnds dry    Rads:  No orders to display       A: There are no active hospital problems to display for this patient.        P: Discharge    John Ganser M.D.  [facility]  "

## 2019-07-17 NOTE — PROGRESS NOTES
Report received.  Assumed Care.  Pt in bed, T415 bed 2. AOx4, responds appropriately.  Denies pain, SOB.  Plan of care discussed.  Explained importance of calling before getting OOB and pt verbalizes understanding.  Call light and belongings within reach, treaded slipper socks on, bed in lowest position.  Will monitor frequently.

## 2019-07-17 NOTE — PROGRESS NOTES
Bedside report received.  Assessment complete.  A&O x 4. Patient calls appropriately.  Patient up with SB assist.    Patient has 5/10 pain. Medication given (see MAR)  Denies N&V. Tolerating clear liquid diet.  x5 abdominal lap sites with dressing, CDI.   + void, - flatus, - BM.  Patient denies SOB.  SCD's on  Review plan with of care with patient. Call light and personal belongings with in reach. Hourly rounding in place. All needs met at this time.

## 2019-07-17 NOTE — DISCHARGE PLANNING
Anticipated Discharge Disposition: Home    Action: RN CM assessed pt at bedside. Pt lives in single story home with her spouse. Pt states she has a good support system with her spouse and children. Pt reports she is independent with her ADLs and IADLs. She has prescription drug coverage through her insurance and uses [facility] on [address] for prescriptions. Pt would like to review an AD packet; packet provided.     Barriers to Discharge: Surgical clearance for discharge home.     Plan: Await surgical clearance for discharge home with no needs.     Care Transition Team Assessment    Information Source  Orientation : Oriented x 4  Information Given By: Patient  Who is responsible for making decisions for patient? : Patient    Readmission Evaluation  Is this a readmission?: No    Elopement Risk  Legal Hold: No  Ambulatory or Self Mobile in Wheelchair: Yes  Disoriented: No  Psychiatric Symptoms: None  History of Wandering: No  Elopement this Admit: No  Vocalizing Wanting to Leave: No  Displays Behaviors, Body Language Wanting to Leave: No-Not at Risk for Elopement    Interdisciplinary Discharge Planning  Does Admitting Nurse Feel This Could be a Complex Discharge?: No  Primary Care Physician: JD Licona  Lives with - Patient's Self Care Capacity: Spouse  Patient or legal guardian wants to designate a caregiver (see row info): No  Support Systems: Children, Family Member(s)  Housing / Facility: 1 Story House  Do You Take your Prescribed Medications Regularly: Yes  Able to Return to Previous ADL's: Yes  Mobility Issues: No  Prior Services: None  Patient Expects to be Discharged to:: Home  Assistance Needed: No  Durable Medical Equipment: Not Applicable    Discharge Preparedness  What is your plan after discharge?: Home with help  What are your discharge supports?: Child, Spouse  Prior Functional Level: Ambulatory, Drives Self, Independent with Activities of Daily Living, Independent with Medication  Management  Difficulity with ADLs: None  Difficulity with IADLs: None    Functional Assesment  Prior Functional Level: Ambulatory, Drives Self, Independent with Activities of Daily Living, Independent with Medication Management    Finances  Financial Barriers to Discharge: No  Prescription Coverage: Yes    Vision / Hearing Impairment  Vision Impairment : Yes  Right Eye Vision: Wears Glasses (reading)  Left Eye Vision: Wears Glasses (reading)  Hearing Impairment : No         Advance Directive  Advance Directive?: None  Advance Directive offered?: AD Booklet given    Domestic Abuse  Have you ever been the victim of abuse or violence?: No  Physical Abuse or Sexual Abuse: No  Verbal Abuse or Emotional Abuse: No  Possible Abuse Reported to:: Not Applicable    Psychological Assessment  History of Substance Abuse: None  History of Psychiatric Problems: No    Discharge Risks or Barriers  Discharge risks or barriers?: No    Anticipated Discharge Information  Anticipated discharge disposition: Home  Discharge Address: [facility] [address] [place]  Discharge Contact Phone Number: [phone]

## 2019-07-17 NOTE — CARE PLAN
Problem: Venous Thromboembolism (VTW)/Deep Vein Thrombosis (DVT) Prevention:  Goal: Patient will participate in Venous Thrombosis (VTE)/Deep Vein Thrombosis (DVT)Prevention Measures  Outcome: PROGRESSING AS EXPECTED  Pt has SCD's in place, plan to start scheduled lovenox at 0900 per MAR    Problem: Bowel/Gastric:  Goal: Normal bowel function is maintained or improved  Outcome: PROGRESSING AS EXPECTED  Tolerating clear liquid diet at this time, denies nausea

## 2019-07-17 NOTE — ANESTHESIA TIME REPORT
Anesthesia Start and Stop Event Times     Date Time Event    7/16/2019 1614 Anesthesia Start     1820 Anesthesia Stop        Responsible Staff  07/16/19    Name Role Begin End    Cliff Winn M.D. Anesth 1614 1820        Preop Diagnosis (Free Text):  Pre-op Diagnosis     RECURRENT PARAESOPHAGEAL HIATAL HERNIA         Preop Diagnosis (Codes):  Diagnosis Information     Diagnosis Code(s): Paraesophageal hiatal hernia [K44.9]        Post op Diagnosis  Esophageal hiatal hernia      Premium Reason  A. 3PM - 7AM    Comments:

## 2019-07-17 NOTE — ANESTHESIA POSTPROCEDURE EVALUATION
Patient: Felipa Stuart    Procedure Summary     Date:  07/16/19 Room / Location:  [facility] OR 10 / SURGERY [facility]    Anesthesia Start:  1614 Anesthesia Stop:  1820    Procedure:  FUNDOPLICATION, NISSEN, LAPAROSCOPIC- REPAIR PARAESOPHAGEAL HIATAL HERNIA WITH MESH Diagnosis:       Paraesophageal hiatal hernia      (recurrent paraesophageal hiatal hernia )    Surgeon:  John H Ganser, M.D. Responsible Provider:  Cliff Winn M.D.    Anesthesia Type:  general ASA Status:  3          Final Anesthesia Type: general  Last vitals  BP   Blood Pressure : 137/65    Temp   36.3 °C (97.4 °F)    Pulse   Pulse: (!) 57   Resp   18    SpO2   96 %      Anesthesia Post Evaluation    Patient location during evaluation: PACU  Patient participation: complete - patient participated  Level of consciousness: awake and alert  Pain score: 3    Airway patency: patent  Anesthetic complications: no  Cardiovascular status: hemodynamically stable  Respiratory status: acceptable  Hydration status: euvolemic    PONV: none           some post op delirum.in pacu

## 2019-07-17 NOTE — OR SURGEON
Immediate Post OP Note    PreOp Diagnosis: Recurrent paraesophageal hiatal hernia    PostOp Diagnosis: Same    Procedure(s):  LAPAROSCOPIC- REPAIR RECURRENT PARAESOPHAGEAL HIATAL HERNIA WITH MESH AND FUNDOPLICATION- Wound Class: Clean    Surgeon(s):  John H Ganser, M.D.    Anesthesiologist/Type of Anesthesia:  Anesthesiologist: Cliff Winn M.D./General    Surgical Staff:  Assistant: JOSIE Quiñones  Circulator: Wendy Roth R.N.  Scrub Person: Marianna Mccormick    Specimens removed if any:  * No specimens in log *    Estimated Blood Loss: 20ml    Findings: 0    Complications: 0        7/16/2019 5:51 PM John H Ganser, M.D.

## 2019-07-17 NOTE — PROGRESS NOTES
Pt is A&O 4  Pain controlled at this time on current regimen  Denies nausea  Tolerating Diet clear liquids  Surgical incision 5 lap sites to abdomen, CDI, no drainage, covered with gauze and tagederm  + Urine Void   - Flatus  - BM Void  Up standby, low fall risk per manolo hogan, calls appropriately at this time  SCD's on  Family updated on POC  Bed alarm N/A  Reviewed plan of care with patient, bed in lowest position and locked, pt resting comfortably now, call light within reach, all needs met at this time

## 2019-07-17 NOTE — OP REPORT
DATE OF SERVICE:  07/16/2019    PREOPERATIVE DIAGNOSIS:  Recurrent paraesophageal hiatal hernia.    POSTOPERATIVE DIAGNOSIS:  Recurrent paraesophageal hiatal hernia.    PROCEDURE PERFORMED:  Laparoscopic repair of recurrent paraesophageal hiatal   hernia with mesh and fundoplication.    SURGEON:  John H. Ganser, MD    ASSISTANT:  Jose Hamilton PA-C    ANESTHESIA:  General.    ANESTHESIOLOGIST:  Cliff Winn MD    INDICATIONS:  The patient is a 74-year-old female who was diagnosed with a   large paraesophageal hiatal hernia about 3 years ago and underwent a robotic   repair.  At that time, a crural repair was carried out with mesh support, but   no fundoplication was performed and a gastrostomy was placed and removed   several weeks later.  The patient has developed recurrent symptoms with severe   reflux and regurgitation as well as chest discomfort.  Workup shows a very   large paraesophageal hiatal hernia greater than 7 cm and endoscopy shows a   short segment Vallejo's esophagus with no dysplasia.  The risks, benefits, and   alternatives to laparoscopic repair with mesh and fundoplication were   outlined in detail.  All questions were answered and she wished to proceed.    FINDINGS:  There was extensive scarring around the hiatus from prior repair   and a tedious dissection was warranted lengthening the time of the procedure   by about 50% warranting a --22 modifier.  Once the hernia was reduced, the   repair was carried out with AlloMax mesh support and a 270-degree   fundoplication was performed over a 54-[place] bougie.    DESCRIPTION OF PROCEDURE:  Patient was identified, general anesthetic   administered.  Her abdomen was prepped and draped in the usual sterile   fashion.  Local anesthesia 0.5% Marcaine with epinephrine was injected prior   to making skin incision.  Small incision was made in the low epigastric   region, the Veress needle passed.  The abdomen was insufflated with carbon   dioxide without  incident.  A 5 mm blunt trocar and 5 mm 30-degree scope was   inserted.  Nathansen liver retractor was passed through a small subxiphoid   incision, used to elevate the lateral segment of liver.  Right upper quadrant   5 mm, left upper quadrant 11 mm, left lateral subcostal 5 mm trocars were   placed.  The stomach was adherent to the abdominal wall from a prior   gastrostomy.  Cautery was used to take this down.  To be sure there was no   leak, this area was oversewn with 0 Surgidac serosal suture using the   EndoStitch device.  Inspection of the hiatus showed the above findings with   extensive scarring and a large recurrent paraesophageal hiatal hernia.    Dissection was begun by dividing the gastrohepatic ligament and adhesions   between the liver and right cara muscle.  The lateral segment of liver was   freed anteriorly as well.  Omental adhesions were taken down with the Harmonic   scalpel, which was used for all the dissection.  Starting on the right cara,   a plane was created between the hernia and mediastinum and the hernia sac   teased out anteriorly.  This was then dissected over to the left.  The hernia   sac was then able to be teased out of the mediastinum and its contents were   reduced.  The hernia sac was then completely mobilized from the hiatus.    Circumferential dissection was carried around the esophagus well into the   mediastinum along the gastroesophageal junction to be brought well below the   hiatus under no tension upwards.  The hernia sac was taken off the greater   curve of the stomach and further short gastric vessels taken down to allow for   fundoplication.  Inspection of the stomach showed no evidence of serosal   injuries.  Noted the right pleura was entered during the procedure and   insufflation pressures were reduced.    Posterior hiatal hernia repair was carried out through Surgidac using the   EndoStitch device bringing the cara muscles together nicely under minimal    tension.  A 5x8 cm AlloMax tissue mesh was then soaked in saline notched to   accommodate the esophagus and placed in the abdomen.  It was secured to the   posterior two-thirds of the hiatus with a running 0 Surgidac suture.    Fundoplication was then carried out rotating the fundus from left to right   behind the esophagus maintaining anterior posterior orientation with tension   on the esophagus.  Right and left posterolateral tacking sutures were placed   between the fundus and the hiatus including mesh as well as the posterior wall   of the esophagus to prevent any posterior recurrence or slippage.  A   54-[place] bougie was then passed by anesthesia.  Starting on the right, the   fundus was brought anteriorly and tacked to the anterolateral aspect of the   esophagus with 3 sutures using the EndoStitch device with the uppermost suture   incorporating the mesh and hiatus.  On the left, the wrap was brought   anteriorly and secured to the left anterolateral aspect of the esophagus   completing a 270-degree wrap incorporating the mesh and hiatus with the   uppermost suture.  The mesh was then brought together anteriorly and secured   down to the hiatus there.  The bougie was removed and the fundoplication   maintained good orientation with no torsion or tension upwards.  The posterior   aspect of the mesh was tacked down to the crural repair and also the back   wall of the esophagus with an additional suture.  The abdomen was irrigated   and hemostasis assured.  A Valsalva was used by anesthesia to express any gas   remaining in the right pleural space and this was suctioned.  The trocars were   withdrawn, the abdomen deflated, and incisions closed with Vicryl.  Sterile   dressings were applied.  The patient returned to recovery in stable condition.       ____________________________________     JOHN H. GANSER, MD [facility] / [place]    DD:  07/16/2019 17:58:20  DT:  07/16/2019 18:24:54    D#:  [id number]  Job#:   [id number]    cc: MAYLIN VELEZ, Elan Ocasio MD

## 2019-07-17 NOTE — PROGRESS NOTES
Patient discharged to home with family and staff escort. IV discontinued. Prescriptions given to patient. Patient verbalized understanding, consent signed and in chart. Discharge instructions given regarding activity limitations, follow up appointments, incision care, etc. Education provided, patient asked questions and verbalized understanding. Discharge paperwork signed and in chart. Patient is tolerating diet, stable when ambulating, and pain is well controlled. All belongings collected. No further questions or concerns at this time.

## 2019-07-17 NOTE — CARE PLAN
Problem: Bowel/Gastric:  Goal: Will not experience complications related to bowel motility  Outcome: PROGRESSING AS EXPECTED  Patient denies N/V, tolerating clear liquid diet, encourage ambualation,     Problem: Pain Management  Goal: Pain level will decrease to patient's comfort goal  Outcome: PROGRESSING AS EXPECTED  Assess pain Q2-4H, administer pain medication when indicated, encourage patient to voice pain

## 2019-11-05 DIAGNOSIS — Z79.01 CHRONIC ANTICOAGULATION: ICD-10-CM

## 2019-11-19 ENCOUNTER — ANTICOAGULATION VISIT (OUTPATIENT)
Dept: VASCULAR LAB | Facility: MEDICAL CENTER | Age: 74
End: 2019-11-19
Attending: INTERNAL MEDICINE
Payer: MEDICARE

## 2019-11-19 VITALS — DIASTOLIC BLOOD PRESSURE: 63 MMHG | HEART RATE: 55 BPM | SYSTOLIC BLOOD PRESSURE: 113 MMHG

## 2019-11-19 DIAGNOSIS — Z79.01 CHRONIC ANTICOAGULATION: ICD-10-CM

## 2019-11-19 PROCEDURE — 99211 OFF/OP EST MAY X REQ PHY/QHP: CPT

## 2019-11-19 NOTE — PROGRESS NOTES
Target end date: Indefinite     Indication: AF     Drug: Eliquis 5mg BID     CHADsVASC = 4    Health Status Since Last Assessment   Patient denies any new relevant medical problems, ED visits or hospitalizations   Patient denies any embolic events (stroke/tia/systemic embolism)    Adherence with DOAC Therapy   Pt has 0 missed any doses in the average week    Bleeding Risk Assessment     NO Epistaxis   Pt denies any excessive or unusual bleeding/hematomas.  Pt denies any GI bleeds or hematemesis.  Pt denies any concerning daily headache or sub dural hematoma symptoms.     Pt denies any hematuria or abnormal vaginal bleeding.   Latest Hemoglobin 12.9   ETOH overuse no- occasional drinking     Creatinine Clearance/Renal Function     Latest ClCr Results for COLTEN EM (MRN [id number]) as of 11/19/2019 11:43   Ref. Range 7/8/2019 15:06   Creatinine Latest Ref Range: 0.50 - 1.40 mg/dL 0.97   GFR If  Latest Ref Range: >60 mL/min/1.73 m 2 >60   GFR If Non  Latest Ref Range: >60 mL/min/1.73 m 2 56 (A)        Drug Interactions   Platelets: 217   ASA/other antiplatelets none   NSAID none   Other drug interactions no   NO Verified no anticonvulsant or azole therapy, education provided for future use.     Examination   Blood Pressure 113/63   Symptomatic hypotension no   Significant gait impairment/imbalance/high risk for falls? no    Final Assessment and Recommendations:   Patient appears stable from the anticoagulation staindpoint.     Benefits of continued DOAC therapy outweigh risks for this patient   Recommend pt continue with current DOAC therapy Eliquis 5mg BID     Other Actions: cmp/ cbc hemogram ordered prior to next visit    Follow up:   Will follow up with patient 6 months.     Next appt: Tuesday, May 19, 2019  11:45am    Abran BarreraD

## 2019-12-18 ENCOUNTER — OFFICE VISIT (OUTPATIENT)
Dept: CARDIOLOGY | Facility: MEDICAL CENTER | Age: 74
End: 2019-12-18
Payer: MEDICARE

## 2019-12-18 VITALS
DIASTOLIC BLOOD PRESSURE: 68 MMHG | HEART RATE: 60 BPM | SYSTOLIC BLOOD PRESSURE: 126 MMHG | HEIGHT: 70 IN | OXYGEN SATURATION: 94 % | WEIGHT: 181.6 LBS | BODY MASS INDEX: 26 KG/M2

## 2019-12-18 DIAGNOSIS — Z86.79 S/P ABLATION OF VENTRICULAR ARRHYTHMIA: ICD-10-CM

## 2019-12-18 DIAGNOSIS — Z79.01 CHRONIC ANTICOAGULATION: Chronic | ICD-10-CM

## 2019-12-18 DIAGNOSIS — I49.3 VPC'S (VENTRICULAR PREMATURE COMPLEXES): ICD-10-CM

## 2019-12-18 DIAGNOSIS — Z98.890 S/P ABLATION OF VENTRICULAR ARRHYTHMIA: ICD-10-CM

## 2019-12-18 DIAGNOSIS — G45.9 TIA (TRANSIENT ISCHEMIC ATTACK): ICD-10-CM

## 2019-12-18 DIAGNOSIS — I44.2 AIVR (ACCELERATED IDIOVENTRICULAR RHYTHM) (HCC): Chronic | ICD-10-CM

## 2019-12-18 DIAGNOSIS — I49.8 VENTRICULAR BIGEMINY: ICD-10-CM

## 2019-12-18 PROCEDURE — 99214 OFFICE O/P EST MOD 30 MIN: CPT | Performed by: INTERNAL MEDICINE

## 2019-12-18 ASSESSMENT — ENCOUNTER SYMPTOMS
ABDOMINAL PAIN: 0
SORE THROAT: 0
SHORTNESS OF BREATH: 0
CHILLS: 0
COUGH: 0
FEVER: 0
WEAKNESS: 0
DIZZINESS: 0
PALPITATIONS: 0
NAUSEA: 0
BLURRED VISION: 0
PND: 0
BRUISES/BLEEDS EASILY: 0
FALLS: 0
FOCAL WEAKNESS: 0
CLAUDICATION: 0

## 2019-12-19 NOTE — PROGRESS NOTES
Chief Complaint   Patient presents with   • Bradycardia       Subjective:   Felipa Stuart is a 74 y.o. female who presents today for follow-up of her history of arrhythmia with PVCs hand: Idioventricular rhythm with prior history of stroke on anticoagulation    She did have medicine this year with significant improvement in her GI symptoms she has had a couple episodes of feeling out of sorts with weakness in the legs near syncope but was wondering if this was related to her heart rhythm issues    sHe is tolerating anticoagulation well    Past Medical History:   Diagnosis Date   • AIVR (accelerated idioventricular rhythm) (HCC) on Zio    • Anesthesia     post op nausea   • Arrhythmia     bigeminal PVC's, SVT with questionable Afib   • Bradycardia    • CATARACT     bianka IOL   • Chicken pox     as child    • Chronic anticoagulation - concern for afib    • Heart burn    • Hiatal hernia    • Hiatus hernia syndrome     repaired 2/28/2016   • HX: benign breast biopsy     Rt breast   • Hypothyroidism    • Measles     as child    • PONV (postoperative nausea and vomiting)    • Scarlet fever     as child    • Stroke (HCC) 2/28/2016    mild speech delay   • TIA (transient ischemic attack) 06/11/2013     Past Surgical History:   Procedure Laterality Date   • PB LAP,ESOPHAGOGAST FUNDOPLASTY  7/16/2019    Procedure: FUNDOPLICATION, NISSEN, LAPAROSCOPIC- REPAIR PARAESOPHAGEAL HIATAL HERNIA WITH MESH;  Surgeon: John H Ganser, M.D.;  Location: [facility];  Service: General   • RECOVERY  7/13/2016    Procedure: CATH LAB-PVC ABLATION WAGNER-REGAN -CARTO;  Surgeon: Recoveryonly Surgery;  [place] [facility] [place];  Service:    • OTHER ABDOMINAL SURGERY  2/2016    hiatal hernia repair, Dr Darnell   • FULL THICKNESS BLOCK RESECTION  3/18/2014    Performed by You Abbott M.D. at SURGERY SURGICAL [facility]   • OTHER  2013    bladder sling    • OTHER ABDOMINAL SURGERY  2013    cholecystectomy    • CATARACT EXTRACTION WITH IOL Bilateral    • LUMPECTOMY Right 1992    right breast   • THYROID LOBECTOMY     • OTHER  1975    D&C and tubal ligation    • ZZZ CARDIAC CATH      ablation     Family History   Problem Relation Age of Onset   • Cancer Mother      Social History     Socioeconomic History   • Marital status:      Spouse name: Not on file   • Number of children: Not on file   • Years of education: Not on file   • Highest education level: Not on file   Occupational History   • Not on file   Social Needs   • Financial resource strain: Not on file   • Food insecurity:     Worry: Not on file     Inability: Not on file   • Transportation needs:     Medical: Not on file     Non-medical: Not on file   Tobacco Use   • Smoking status: Former Smoker     Packs/day: 0.20     Years: 20.00     Pack years: 4.00     Types: Cigarettes     Last attempt to quit: 1980     Years since quittin.9   • Smokeless tobacco: Never Used   Substance and Sexual Activity   • Alcohol use: Yes     Comment: 3 per week    • Drug use: No   • Sexual activity: Not on file   Lifestyle   • Physical activity:     Days per week: Not on file     Minutes per session: Not on file   • Stress: Not on file   Relationships   • Social connections:     Talks on phone: Not on file     Gets together: Not on file     Attends [employer] service: Not on file     Active member of club or organization: Not on file     Attends meetings of clubs or organizations: Not on file     Relationship status: Not on file   • Intimate partner violence:     Fear of current or ex partner: Not on file     Emotionally abused: Not on file     Physically abused: Not on file     Forced sexual activity: Not on file   Other Topics Concern   • Not on file   Social History Narrative   • Not on file     No Known Allergies  Outpatient Encounter Medications as of 2019   Medication Sig Dispense Refill   • levothyroxine (SYNTHROID) 50 MCG Tab Take 50 mcg by mouth  "Every morning on an empty stomach.     • atorvastatin (LIPITOR) 20 MG Tab Take 1 Tab by mouth every bedtime. 90 Tab 3   • apixaban (ELIQUIS) 5mg Tab Take 1 Tab by mouth 2 Times a Day. 180 Tab 3   • omeprazole (PRILOSEC) 40 MG delayed-release capsule Take 40 mg by mouth 2 times a day.     • vitamin D (CHOLECALCIFEROL) 1000 UNIT Tab Take 1,000 Units by mouth every day.     • Omega-3 Fatty Acids (FISH OIL) 1000 MG CAPSULE DELAYED RELEASE Take 1,000 mg by mouth 3 times a day. 90 Cap 2   • liothyronine (CYTOMEL) 5 MCG TABS Take 5 mcg by mouth every morning.       No facility-administered encounter medications on file as of 12/18/2019.      Review of Systems   Constitutional: Negative for chills and fever.   HENT: Negative for sore throat.    Eyes: Negative for blurred vision.   Respiratory: Negative for cough and shortness of breath.    Cardiovascular: Negative for chest pain, palpitations, claudication, leg swelling and PND.   Gastrointestinal: Negative for abdominal pain and nausea.   Musculoskeletal: Negative for falls and joint pain.   Skin: Negative for rash.   Neurological: Negative for dizziness, focal weakness and weakness.   Endo/Heme/Allergies: Does not bruise/bleed easily.        Objective:   /68 (BP Location: Left arm, Patient Position: Sitting, BP Cuff Size: Adult)   Pulse 60   Ht 1.778 m (5' 10\")   Wt 82.4 kg (181 lb 9.6 oz)   SpO2 94%   BMI 26.06 kg/m²     Physical Exam   Constitutional: No distress.   HENT:   Mouth/Throat: Oropharynx is clear and moist. No oropharyngeal exudate.   Eyes: No scleral icterus.   Neck: No JVD present.   Cardiovascular: Normal rate and normal heart sounds. Exam reveals no gallop and no friction rub.   No murmur heard.  Pulmonary/Chest: No respiratory distress. She has no wheezes. She has no rales.   Abdominal: Soft. Bowel sounds are normal.   Musculoskeletal:         General: No edema.   Neurological: She is alert.   Skin: No rash noted. She is not diaphoretic. "   Psychiatric: She has a normal mood and affect.       Assessment:     1. TIA (transient ischemic attack)  Holter Monitor / Event Recorder   2. VPC's (ventricular premature complexes)     3. AIVR (accelerated idioventricular rhythm) (HCC) on Zio  Holter Monitor / Event Recorder   4. Chronic anticoagulation - concern for afib     5. S/P ablation of ventricular arrhythmia     6. Ventricular bigeminy         Medical Decision Making:  Today's Assessment / Status / Plan:     It was my pleasure to meet with Ms. Stuart.    We can have her wear a monitor to see if her symptoms are correlate with arrhythmia I wonder though if they are just vagal after the Nissen    Blood pressure is well controlled.      She understands the risks and benefits of chronic anticoagulation.  I sent a note to the anticoagulation clinic as she has to travel some distance so preferably just follow-up with us    I will see Ms. Stuart back in 1 year time and encouraged her to follow up with us over the phone or electronically using my MyChart as issues arise.    It is my pleasure to participate in the care of Ms. Stuart.  Please do not hesitate to contact me with questions or concerns.    Mir Carvajal MD PhD [facility]  Cardiologist [facility]    Please note that this dictation was created using voice recognition software. I have worked with consultants from the vendor as well as technical experts from [facility] to optimize the interface. I have made every reasonable attempt to correct obvious errors, but I expect that there are errors of grammar and possibly content I did not discover before finalizing the note.

## 2020-01-17 DIAGNOSIS — I47.10 SVT (SUPRAVENTRICULAR TACHYCARDIA) (HCC): ICD-10-CM

## 2020-01-17 RX ORDER — APIXABAN 5 MG/1
TABLET, FILM COATED ORAL
Qty: 180 TAB | Refills: 3 | Status: SHIPPED | OUTPATIENT
Start: 2020-01-17 | End: 2020-11-16

## 2020-01-21 ENCOUNTER — TELEPHONE (OUTPATIENT)
Dept: CARDIOLOGY | Facility: MEDICAL CENTER | Age: 75
End: 2020-01-21

## 2020-01-21 ENCOUNTER — NON-PROVIDER VISIT (OUTPATIENT)
Dept: CARDIOLOGY | Facility: MEDICAL CENTER | Age: 75
End: 2020-01-21
Payer: MEDICARE

## 2020-01-21 DIAGNOSIS — G45.9 TIA (TRANSIENT ISCHEMIC ATTACK): ICD-10-CM

## 2020-01-21 DIAGNOSIS — I47.29 NSVT (NONSUSTAINED VENTRICULAR TACHYCARDIA) (HCC): ICD-10-CM

## 2020-01-21 PROCEDURE — 93268 ECG RECORD/REVIEW: CPT | Performed by: INTERNAL MEDICINE

## 2020-01-21 NOTE — TELEPHONE ENCOUNTER
Patient enrolled in the 30 day [employer] Heart monitoring program.  >In office hookup with Baseline transmitted.  >Pending EOS.

## 2020-03-04 DIAGNOSIS — G45.9 TIA (TRANSIENT ISCHEMIC ATTACK): ICD-10-CM

## 2020-03-04 DIAGNOSIS — I44.2 AIVR (ACCELERATED IDIOVENTRICULAR RHYTHM) (HCC): Chronic | ICD-10-CM

## 2020-03-06 ENCOUNTER — TELEPHONE (OUTPATIENT)
Dept: CARDIOLOGY | Facility: MEDICAL CENTER | Age: 75
End: 2020-03-06

## 2020-03-06 NOTE — TELEPHONE ENCOUNTER
Result Notes for Holter Monitor / Event Recorder   Notes recorded by Mir Carvajal M.D. on 3/5/2020 at 3:13 PM PST    The holter test looks okay, nothing alarming, please let her know     Thank you     Letter printed with MD recommendations and mailed to pt mailing address.

## 2020-06-08 ENCOUNTER — TELEPHONE (OUTPATIENT)
Dept: CARDIOLOGY | Facility: MEDICAL CENTER | Age: 75
End: 2020-06-08

## 2020-06-08 NOTE — TELEPHONE ENCOUNTER
Pt was wondering about hear rhtyhm    It is my pleasure to participate in the care of Ms. Stuart.  Please do not hesitate to contact me with questions or concerns.    Mir Carvajal MD PhD [facility]  Cardiologist [facility]    Please note that this dictation was created using voice recognition software. There may be errors I did not discover before finalizing the note.     6/8/2020  2:28 PM

## 2020-10-19 ENCOUNTER — HOSPITAL ENCOUNTER (OUTPATIENT)
Dept: RADIOLOGY | Facility: MEDICAL CENTER | Age: 75
End: 2020-10-19
Attending: NURSE PRACTITIONER
Payer: MEDICARE

## 2020-10-19 DIAGNOSIS — Z12.31 VISIT FOR SCREENING MAMMOGRAM: ICD-10-CM

## 2020-10-19 PROCEDURE — 77067 SCR MAMMO BI INCL CAD: CPT

## 2020-10-23 DIAGNOSIS — Z79.01 CHRONIC ANTICOAGULATION: ICD-10-CM

## 2020-11-13 DIAGNOSIS — I47.10 SVT (SUPRAVENTRICULAR TACHYCARDIA) (HCC): ICD-10-CM

## 2020-11-16 RX ORDER — APIXABAN 5 MG/1
TABLET, FILM COATED ORAL
Qty: 180 TAB | Refills: 0 | Status: SHIPPED | OUTPATIENT
Start: 2020-11-16 | End: 2021-02-05

## 2020-12-07 ENCOUNTER — OFFICE VISIT (OUTPATIENT)
Dept: CARDIOLOGY | Facility: MEDICAL CENTER | Age: 75
End: 2020-12-07
Payer: MEDICARE

## 2020-12-07 VITALS
RESPIRATION RATE: 16 BRPM | WEIGHT: 195 LBS | HEART RATE: 58 BPM | OXYGEN SATURATION: 96 % | DIASTOLIC BLOOD PRESSURE: 70 MMHG | BODY MASS INDEX: 27.92 KG/M2 | HEIGHT: 70 IN | SYSTOLIC BLOOD PRESSURE: 110 MMHG

## 2020-12-07 DIAGNOSIS — Z79.01 CHRONIC ANTICOAGULATION: Chronic | ICD-10-CM

## 2020-12-07 DIAGNOSIS — Z98.890 S/P ABLATION OF VENTRICULAR ARRHYTHMIA: ICD-10-CM

## 2020-12-07 DIAGNOSIS — I49.8 VENTRICULAR BIGEMINY: ICD-10-CM

## 2020-12-07 DIAGNOSIS — G45.8 OTHER SPECIFIED TRANSIENT CEREBRAL ISCHEMIAS: ICD-10-CM

## 2020-12-07 DIAGNOSIS — Z86.79 S/P ABLATION OF VENTRICULAR ARRHYTHMIA: ICD-10-CM

## 2020-12-07 DIAGNOSIS — I49.3 VPC'S (VENTRICULAR PREMATURE COMPLEXES): ICD-10-CM

## 2020-12-07 DIAGNOSIS — G45.9 TIA (TRANSIENT ISCHEMIC ATTACK): ICD-10-CM

## 2020-12-07 DIAGNOSIS — I44.2 AIVR (ACCELERATED IDIOVENTRICULAR RHYTHM) (HCC): Chronic | ICD-10-CM

## 2020-12-07 DIAGNOSIS — R22.1 MASS OF RIGHT SIDE OF NECK: ICD-10-CM

## 2020-12-07 PROCEDURE — 99214 OFFICE O/P EST MOD 30 MIN: CPT | Performed by: INTERNAL MEDICINE

## 2020-12-07 RX ORDER — OMEGA-3-ACID ETHYL ESTERS 1 G/1
CAPSULE, LIQUID FILLED ORAL
COMMUNITY
Start: 2020-11-18 | End: 2022-12-12

## 2020-12-07 RX ORDER — OMEPRAZOLE 20 MG/1
CAPSULE, DELAYED RELEASE ORAL
COMMUNITY
Start: 2020-11-30 | End: 2020-12-07

## 2020-12-07 ASSESSMENT — ENCOUNTER SYMPTOMS
PND: 0
WEAKNESS: 0
BLURRED VISION: 0
SHORTNESS OF BREATH: 0
CLAUDICATION: 0
NAUSEA: 0
BRUISES/BLEEDS EASILY: 0
FEVER: 0
SORE THROAT: 0
FOCAL WEAKNESS: 0
COUGH: 0
CHILLS: 0
DIZZINESS: 0
ABDOMINAL PAIN: 0
FALLS: 0
PALPITATIONS: 0

## 2020-12-07 ASSESSMENT — FIBROSIS 4 INDEX: FIB4 SCORE: 1.9

## 2020-12-07 NOTE — PROGRESS NOTES
Chief Complaint   Patient presents with   • Bradycardia   • Transient Ischemic Attack       Subjective:   Felipa Stuart is a 75 y.o. female who presents today for follow-up of her history of arrhythmia with PVCs prior stroke she is on chronic anticoagulation  She has been doing okay for the last year    We have done a monitor earlier last year for arrhythmia evaluation    Past Medical History:   Diagnosis Date   • AIVR (accelerated idioventricular rhythm) (HCC) on Zio    • Anesthesia     post op nausea   • Arrhythmia     bigeminal PVC's, SVT with questionable Afib   • Bradycardia    • CATARACT     bianka IOL   • Chicken pox     as child    • Chronic anticoagulation - concern for afib    • Heart burn    • Hiatal hernia    • Hiatus hernia syndrome     repaired 2/28/2016   • HX: benign breast biopsy     Rt breast   • Hypothyroidism    • Measles     as child    • PONV (postoperative nausea and vomiting)    • Scarlet fever     as child    • Stroke (HCC) 2/28/2016    mild speech delay   • TIA (transient ischemic attack) 06/11/2013     Past Surgical History:   Procedure Laterality Date   • PB LAP,ESOPHAGOGAST FUNDOPLASTY  7/16/2019    Procedure: FUNDOPLICATION, NISSEN, LAPAROSCOPIC- REPAIR PARAESOPHAGEAL HIATAL HERNIA WITH MESH;  Surgeon: John H Ganser, M.D.;  Location: SURGERY [facility];  Service: General   • RECOVERY  7/13/2016    Procedure: CATH LAB-PVC ABLATION WAGNER-REGAN -CARTO;  Surgeon: [facility];  [place] [facility] [place]  Service:    • OTHER ABDOMINAL SURGERY  2/2016    hiatal hernia repair, Dr Darnell   • FULL THICKNESS BLOCK RESECTION  3/18/2014    Performed by You Abbott M.D. at SURGERY SURGICAL [facility]S   • OTHER  2013    bladder sling    • OTHER ABDOMINAL SURGERY  2013    cholecystectomy   • CATARACT EXTRACTION WITH IOL Bilateral 2008   • LUMPECTOMY Right 1992    right breast   • THYROID LOBECTOMY  1976   • OTHER  1975    D&C and tubal ligation    • ZZZ  CARDIAC CATH      ablation     Family History   Problem Relation Age of Onset   • Cancer Mother      Social History     Socioeconomic History   • Marital status:      Spouse name: Not on file   • Number of children: Not on file   • Years of education: Not on file   • Highest education level: Not on file   Occupational History   • Not on file   Social Needs   • Financial resource strain: Not on file   • Food insecurity     Worry: Not on file     Inability: Not on file   • Transportation needs     Medical: Not on file     Non-medical: Not on file   Tobacco Use   • Smoking status: Former Smoker     Packs/day: 0.20     Years: 20.00     Pack years: 4.00     Types: Cigarettes     Quit date: 1980     Years since quittin.9   • Smokeless tobacco: Never Used   Substance and Sexual Activity   • Alcohol use: Yes     Comment: 3 per week    • Drug use: No   • Sexual activity: Not on file   Lifestyle   • Physical activity     Days per week: Not on file     Minutes per session: Not on file   • Stress: Not on file   Relationships   • Social connections     Talks on phone: Not on file     Gets together: Not on file     Attends Adventism service: Not on file     Active member of club or organization: Not on file     Attends meetings of clubs or organizations: Not on file     Relationship status: Not on file   • Intimate partner violence     Fear of current or ex partner: Not on file     Emotionally abused: Not on file     Physically abused: Not on file     Forced sexual activity: Not on file   Other Topics Concern   • Not on file   Social History Narrative   • Not on file     No Known Allergies  Outpatient Encounter Medications as of 2020   Medication Sig Dispense Refill   • omega-3 acid ethyl esters (LOVAZA) 1 GM capsule      • ELIQUIS 5 MG Tab TAKE 1 TABLET BY MOUTH TWO  TIMES DAILY 180 Tab 0   • levothyroxine (SYNTHROID) 50 MCG Tab Take 50 mcg by mouth Every morning on an empty stomach.     • atorvastatin  "(LIPITOR) 20 MG Tab Take 1 Tab by mouth every bedtime. 90 Tab 3   • omeprazole (PRILOSEC) 40 MG delayed-release capsule Take 40 mg by mouth every day.     • vitamin D (CHOLECALCIFEROL) 1000 UNIT Tab Take 1,000 Units by mouth every day.     • liothyronine (CYTOMEL) 5 MCG TABS Take 5 mcg by mouth every morning.     • [DISCONTINUED] omeprazole (PRILOSEC) 20 MG delayed-release capsule      • [DISCONTINUED] Omega-3 Fatty Acids (FISH OIL) 1000 MG CAPSULE DELAYED RELEASE Take 1,000 mg by mouth 3 times a day. (Patient not taking: Reported on 12/7/2020) 90 Cap 2     No facility-administered encounter medications on file as of 12/7/2020.      Review of Systems   Constitutional: Negative for chills and fever.   HENT: Negative for sore throat.    Eyes: Negative for blurred vision.   Respiratory: Negative for cough and shortness of breath.    Cardiovascular: Negative for chest pain, palpitations, claudication, leg swelling and PND.   Gastrointestinal: Negative for abdominal pain and nausea.   Musculoskeletal: Negative for falls and joint pain.   Skin: Negative for rash.   Neurological: Negative for dizziness, focal weakness and weakness.   Endo/Heme/Allergies: Does not bruise/bleed easily.        Objective:   /70 (BP Location: Left arm, Patient Position: Sitting, BP Cuff Size: Adult)   Pulse (!) 58   Resp 16   Ht 1.778 m (5' 10\")   Wt 88.5 kg (195 lb)   SpO2 96%   BMI 27.98 kg/m²     Physical Exam   Constitutional: No distress.   HENT:   Patient wearing a mask due to COVID precautions   Eyes: No scleral icterus.   Neck: No JVD present.   Cardiovascular: Normal rate and normal heart sounds. Exam reveals no gallop and no friction rub.   No murmur heard.  Pulmonary/Chest: No respiratory distress. She has no wheezes. She has no rales.   Abdominal: Soft. Bowel sounds are normal.   Musculoskeletal:         General: No edema.   Neurological: She is alert.   Skin: No rash noted. She is not diaphoretic.   Psychiatric: She has " a normal mood and affect.     We had done a heart rhythm monitor this year was within acceptable range    We reviewed in person the most recent labs    Assessment:     1. S/P ablation of ventricular arrhythmia     2. TIA (transient ischemic attack)     3. Ventricular bigeminy     4. VPC's (ventricular premature complexes)     5. AIVR (accelerated idioventricular rhythm) (HCC) on Zio     6. Chronic anticoagulation - concern for afib         Medical Decision Making:  Today's Assessment / Status / Plan:     It was my pleasure to meet with Ms. Stuart.    Blood pressure is well controlled.  We specifically assessed the labs on hypertension treatment    She understands the risks and benefits of chronic anticoagulation.  We reviewed and verified the results of annual testing for anemia and kidney function.  She is on appropriate statin.    I will see Ms. Stuart back in 1 year time and encouraged her to follow up with us over the phone or electronically using my MyChart as issues arise.    It is my pleasure to participate in the care of Ms. Stuart.  Please do not hesitate to contact me with questions or concerns.    Mir Carvajal MD PhD [facility]  Cardiologist [facility]    Please note that this dictation was created using voice recognition software. There may be errors I did not discover before finalizing the note.

## 2021-01-05 ENCOUNTER — APPOINTMENT (OUTPATIENT)
Dept: RADIOLOGY | Facility: MEDICAL CENTER | Age: 76
End: 2021-01-05
Attending: INTERNAL MEDICINE
Payer: MEDICARE

## 2021-01-13 DIAGNOSIS — Z23 NEED FOR VACCINATION: ICD-10-CM

## 2021-02-02 DIAGNOSIS — I47.10 SVT (SUPRAVENTRICULAR TACHYCARDIA) (HCC): ICD-10-CM

## 2021-02-03 ENCOUNTER — IMMUNIZATION (OUTPATIENT)
Dept: FAMILY PLANNING/WOMEN'S HEALTH CLINIC | Facility: IMMUNIZATION CENTER | Age: 76
End: 2021-02-03
Attending: INTERNAL MEDICINE
Payer: MEDICARE

## 2021-02-03 DIAGNOSIS — Z23 ENCOUNTER FOR VACCINATION: Primary | ICD-10-CM

## 2021-02-03 DIAGNOSIS — Z23 NEED FOR VACCINATION: ICD-10-CM

## 2021-02-03 PROCEDURE — 91300 PFIZER SARS-COV-2 VACCINE: CPT | Performed by: NURSE PRACTITIONER

## 2021-02-03 PROCEDURE — 0001A PFIZER SARS-COV-2 VACCINE: CPT | Performed by: NURSE PRACTITIONER

## 2021-02-05 RX ORDER — APIXABAN 5 MG/1
TABLET, FILM COATED ORAL
Qty: 180 TAB | Refills: 2 | Status: SHIPPED | OUTPATIENT
Start: 2021-02-05 | End: 2021-09-29

## 2021-02-27 ENCOUNTER — IMMUNIZATION (OUTPATIENT)
Dept: FAMILY PLANNING/WOMEN'S HEALTH CLINIC | Facility: IMMUNIZATION CENTER | Age: 76
End: 2021-02-27
Payer: MEDICARE

## 2021-02-27 DIAGNOSIS — Z23 ENCOUNTER FOR VACCINATION: Primary | ICD-10-CM

## 2021-02-27 PROCEDURE — 91300 PFIZER SARS-COV-2 VACCINE: CPT

## 2021-02-27 PROCEDURE — 0002A PFIZER SARS-COV-2 VACCINE: CPT

## 2021-03-29 ENCOUNTER — HOSPITAL ENCOUNTER (OUTPATIENT)
Dept: RADIOLOGY | Facility: MEDICAL CENTER | Age: 76
End: 2021-03-29
Attending: INTERNAL MEDICINE
Payer: MEDICARE

## 2021-03-29 DIAGNOSIS — R22.1 MASS OF RIGHT SIDE OF NECK: ICD-10-CM

## 2021-03-29 DIAGNOSIS — G45.8 OTHER SPECIFIED TRANSIENT CEREBRAL ISCHEMIAS: ICD-10-CM

## 2021-03-29 PROCEDURE — 93880 EXTRACRANIAL BILAT STUDY: CPT

## 2021-03-31 ENCOUNTER — PATIENT MESSAGE (OUTPATIENT)
Dept: CARDIOLOGY | Facility: MEDICAL CENTER | Age: 76
End: 2021-03-31

## 2021-03-31 DIAGNOSIS — E04.1 THYROID NODULE: ICD-10-CM

## 2021-03-31 NOTE — TELEPHONE ENCOUNTER
----- Message from Mir Carvajal M.D. sent at 3/30/2021  6:21 PM PDT -----  The carotid duplex looks good except she has a nodule on the thyroid typically see PMD or endocrine for evaluation, please let her know     Thank you

## 2021-04-07 ENCOUNTER — TELEPHONE (OUTPATIENT)
Dept: CARDIOLOGY | Facility: MEDICAL CENTER | Age: 76
End: 2021-04-07

## 2021-04-07 DIAGNOSIS — E78.49 OTHER HYPERLIPIDEMIA: ICD-10-CM

## 2021-04-07 NOTE — TELEPHONE ENCOUNTER
CW    Patient called and would like her lab orders sent to another location. She did not give location, she asked to speak to the nurse. She also had some questions and information about the referral.  Please call her at [phone].    Thank you,    Ainsley COSME

## 2021-04-09 NOTE — TELEPHONE ENCOUNTER
Returned pt call and reviewed concerns.  Pt is requesting for labs to be sent to [facility].  Informed pt lab will be ready as any [facility] entity will use the same program for orders.  She is requesting for thyroid panel to be drawn.  Informed pt as a specialty we do not oversee or monitor her thyroid issues.  She verbalizes understanding and states no other concerns or questions at this time.  Pt is appreciative of information given.    Labs ordered.

## 2021-04-10 ENCOUNTER — HOSPITAL ENCOUNTER (OUTPATIENT)
Dept: LAB | Facility: MEDICAL CENTER | Age: 76
End: 2021-04-10
Attending: INTERNAL MEDICINE
Payer: MEDICARE

## 2021-04-10 ENCOUNTER — HOSPITAL ENCOUNTER (OUTPATIENT)
Dept: LAB | Facility: MEDICAL CENTER | Age: 76
End: 2021-04-10
Attending: NURSE PRACTITIONER
Payer: MEDICARE

## 2021-04-10 DIAGNOSIS — E78.49 OTHER HYPERLIPIDEMIA: ICD-10-CM

## 2021-04-10 LAB
ALBUMIN SERPL BCP-MCNC: 4.6 G/DL (ref 3.2–4.9)
ALBUMIN SERPL BCP-MCNC: 4.6 G/DL (ref 3.2–4.9)
ALBUMIN/GLOB SERPL: 1.7 G/DL
ALBUMIN/GLOB SERPL: 1.7 G/DL
ALP SERPL-CCNC: 73 U/L (ref 30–99)
ALP SERPL-CCNC: 73 U/L (ref 30–99)
ALT SERPL-CCNC: 23 U/L (ref 2–50)
ALT SERPL-CCNC: 23 U/L (ref 2–50)
ANION GAP SERPL CALC-SCNC: 12 MMOL/L (ref 7–16)
ANION GAP SERPL CALC-SCNC: 12 MMOL/L (ref 7–16)
AST SERPL-CCNC: 24 U/L (ref 12–45)
AST SERPL-CCNC: 27 U/L (ref 12–45)
BASOPHILS # BLD AUTO: 0.6 % (ref 0–1.8)
BASOPHILS # BLD: 0.03 K/UL (ref 0–0.12)
BILIRUB SERPL-MCNC: 1 MG/DL (ref 0.1–1.5)
BILIRUB SERPL-MCNC: 1 MG/DL (ref 0.1–1.5)
BUN SERPL-MCNC: 21 MG/DL (ref 8–22)
BUN SERPL-MCNC: 21 MG/DL (ref 8–22)
CALCIUM SERPL-MCNC: 9.6 MG/DL (ref 8.5–10.5)
CALCIUM SERPL-MCNC: 9.6 MG/DL (ref 8.5–10.5)
CHLORIDE SERPL-SCNC: 106 MMOL/L (ref 96–112)
CHLORIDE SERPL-SCNC: 106 MMOL/L (ref 96–112)
CHOLEST SERPL-MCNC: 165 MG/DL (ref 100–199)
CHOLEST SERPL-MCNC: 167 MG/DL (ref 100–199)
CO2 SERPL-SCNC: 24 MMOL/L (ref 20–33)
CO2 SERPL-SCNC: 25 MMOL/L (ref 20–33)
CREAT SERPL-MCNC: 0.87 MG/DL (ref 0.5–1.4)
CREAT SERPL-MCNC: 0.91 MG/DL (ref 0.5–1.4)
EOSINOPHIL # BLD AUTO: 0.06 K/UL (ref 0–0.51)
EOSINOPHIL NFR BLD: 1.3 % (ref 0–6.9)
ERYTHROCYTE [DISTWIDTH] IN BLOOD BY AUTOMATED COUNT: 51.1 FL (ref 35.9–50)
FASTING STATUS PATIENT QL REPORTED: NORMAL
FASTING STATUS PATIENT QL REPORTED: NORMAL
GLOBULIN SER CALC-MCNC: 2.7 G/DL (ref 1.9–3.5)
GLOBULIN SER CALC-MCNC: 2.7 G/DL (ref 1.9–3.5)
GLUCOSE SERPL-MCNC: 94 MG/DL (ref 65–99)
GLUCOSE SERPL-MCNC: 97 MG/DL (ref 65–99)
HCT VFR BLD AUTO: 42.2 % (ref 37–47)
HDLC SERPL-MCNC: 72 MG/DL
HDLC SERPL-MCNC: 73 MG/DL
HGB BLD-MCNC: 13.4 G/DL (ref 12–16)
IMM GRANULOCYTES # BLD AUTO: 0.01 K/UL (ref 0–0.11)
IMM GRANULOCYTES NFR BLD AUTO: 0.2 % (ref 0–0.9)
LDLC SERPL CALC-MCNC: 80 MG/DL
LDLC SERPL CALC-MCNC: 81 MG/DL
LYMPHOCYTES # BLD AUTO: 0.74 K/UL (ref 1–4.8)
LYMPHOCYTES NFR BLD: 15.7 % (ref 22–41)
MCH RBC QN AUTO: 28.3 PG (ref 27–33)
MCHC RBC AUTO-ENTMCNC: 31.8 G/DL (ref 33.6–35)
MCV RBC AUTO: 89.2 FL (ref 81.4–97.8)
MONOCYTES # BLD AUTO: 0.36 K/UL (ref 0–0.85)
MONOCYTES NFR BLD AUTO: 7.6 % (ref 0–13.4)
NEUTROPHILS # BLD AUTO: 3.52 K/UL (ref 2–7.15)
NEUTROPHILS NFR BLD: 74.6 % (ref 44–72)
NRBC # BLD AUTO: 0 K/UL
NRBC BLD-RTO: 0 /100 WBC
PLATELET # BLD AUTO: 234 K/UL (ref 164–446)
PMV BLD AUTO: 11.8 FL (ref 9–12.9)
POTASSIUM SERPL-SCNC: 3.9 MMOL/L (ref 3.6–5.5)
POTASSIUM SERPL-SCNC: 4.1 MMOL/L (ref 3.6–5.5)
PROT SERPL-MCNC: 7.3 G/DL (ref 6–8.2)
PROT SERPL-MCNC: 7.3 G/DL (ref 6–8.2)
RBC # BLD AUTO: 4.73 M/UL (ref 4.2–5.4)
SODIUM SERPL-SCNC: 142 MMOL/L (ref 135–145)
SODIUM SERPL-SCNC: 143 MMOL/L (ref 135–145)
TRIGL SERPL-MCNC: 63 MG/DL (ref 0–149)
TRIGL SERPL-MCNC: 63 MG/DL (ref 0–149)
TSH SERPL DL<=0.005 MIU/L-ACNC: 0.94 UIU/ML (ref 0.38–5.33)
WBC # BLD AUTO: 4.7 K/UL (ref 4.8–10.8)

## 2021-04-10 PROCEDURE — 84439 ASSAY OF FREE THYROXINE: CPT

## 2021-04-10 PROCEDURE — 80053 COMPREHEN METABOLIC PANEL: CPT | Mod: 91

## 2021-04-10 PROCEDURE — 80061 LIPID PANEL: CPT | Mod: 91

## 2021-04-10 PROCEDURE — 36415 COLL VENOUS BLD VENIPUNCTURE: CPT

## 2021-04-10 PROCEDURE — 80061 LIPID PANEL: CPT

## 2021-04-10 PROCEDURE — 84443 ASSAY THYROID STIM HORMONE: CPT

## 2021-04-10 PROCEDURE — 85025 COMPLETE CBC W/AUTO DIFF WBC: CPT

## 2021-04-10 PROCEDURE — 80053 COMPREHEN METABOLIC PANEL: CPT

## 2021-04-16 ENCOUNTER — TELEPHONE (OUTPATIENT)
Dept: ENDOCRINOLOGY | Facility: MEDICAL CENTER | Age: 76
End: 2021-04-16

## 2021-04-18 LAB — T4 FREE SERPL DIALY-MCNC: 1.9 NG/DL (ref 1.1–2.4)

## 2021-04-19 ENCOUNTER — TELEPHONE (OUTPATIENT)
Dept: ENDOCRINOLOGY | Facility: MEDICAL CENTER | Age: 76
End: 2021-04-19

## 2021-04-20 ENCOUNTER — PATIENT MESSAGE (OUTPATIENT)
Dept: CARDIOLOGY | Facility: MEDICAL CENTER | Age: 76
End: 2021-04-20

## 2021-04-20 NOTE — TELEPHONE ENCOUNTER
----- Message from Mir Carvajal M.D. sent at 4/12/2021  5:30 PM PDT -----  Labs look good, please let her know     Thank you

## 2021-04-28 ENCOUNTER — OFFICE VISIT (OUTPATIENT)
Dept: ENDOCRINOLOGY | Facility: MEDICAL CENTER | Age: 76
End: 2021-04-28
Attending: NURSE PRACTITIONER
Payer: MEDICARE

## 2021-04-28 VITALS
BODY MASS INDEX: 27.46 KG/M2 | HEART RATE: 75 BPM | WEIGHT: 191.8 LBS | SYSTOLIC BLOOD PRESSURE: 114 MMHG | HEIGHT: 70 IN | DIASTOLIC BLOOD PRESSURE: 68 MMHG

## 2021-04-28 DIAGNOSIS — E03.9 HYPOTHYROIDISM, UNSPECIFIED TYPE: ICD-10-CM

## 2021-04-28 DIAGNOSIS — E89.0 H/O PARTIAL THYROIDECTOMY: ICD-10-CM

## 2021-04-28 PROCEDURE — 99214 OFFICE O/P EST MOD 30 MIN: CPT | Performed by: NURSE PRACTITIONER

## 2021-04-28 PROCEDURE — 99211 OFF/OP EST MAY X REQ PHY/QHP: CPT | Performed by: NURSE PRACTITIONER

## 2021-04-28 ASSESSMENT — FIBROSIS 4 INDEX: FIB4 SCORE: 1.83

## 2021-04-28 NOTE — PROGRESS NOTES
Chief Complaint: Consult requested by [facility] for evaluation of Hypothyroidism.     HPI:     Felipa Stuart is a very pleasant 75 y.o. female with history of Hypothyroidism diagnosed on 1961, partial lobectomy secondary to nodules and is here for initial evaluation.  Patient is unsure which lobe of the thyroid was removed.  Patient reports the following symptoms: which have been present for several months-cold intolerance, thinning hair-genetic. Denies palpitations, tremors, sweat intolerance.  Denies lumps or enlargement in the neck. Denies difficulty swallowing.     She reports a family history of mother and father Hypothyroidim, all 3 daughters hypothyroidism. Reports daily which has been her thyroid hormone dose for several months. Patient denies any recent dose changes.   No recent ultrasound of neck is on file.    Currently taking levothyroxine 50mcg Daily and Liothyronine 5mcg Daily.  Patient takes thyroid hormone on an empty stomach 1 hour prior to meals.  Patient denies taking calcium, antacids and/or iron supplementation.     Ref. Range 4/10/2021 08:42   TSH Latest Ref Range: 0.380 - 5.330 uIU/mL 0.940   Free T4 by [facility] - TMS Latest Ref Range: 1.1 - 2.4 ng/dL 1.9         Patient's medications, allergies, and social histories were reviewed and updated as appropriate.      ROS:     CONS:     No fever, no chills, no weight loss, no fatigue   EYES:      No diplopia, no blurry vision, no redness of eyes, no swelling of eyelids   ENT:    No hearing loss, No ear pain, No sore throat, no dysphagia, no neck swelling   CV:     No chest pain, no palpitations, no claudication, no orthopnea, no PND   PULM:    No SOB, no cough, no hemoptysis, no wheezing    GI:   No nausea, no vomiting, no diarrhea, no constipation, no bloody stools   :  Passing urine well, no dysuria, no hematuria   ENDO:   No polyuria, no polydipsia, no heat intolerance, no cold intolerance   NEURO: No  headaches, no dizziness, no convulsions, no tremors   MUSC:  No joint swellings, no arthralgias, no myalgias, no weakness   SKIN:   No rash, no ulcers, no dry skin   PSYCH:   No depression, no anxiety, no difficulty sleeping       Past Medical History:  Patient Active Problem List    Diagnosis Date Noted   • S/P ablation of ventricular arrhythmia 07/28/2016   • Decreased GFR 07/28/2016   • VPC's (ventricular premature complexes) 07/13/2016   • Cerebral infarction (HCC) 02/28/2016   • TIA (transient ischemic attack) 07/12/2013   • Bradycardia 12/17/2012   • Ventricular bigeminy 12/17/2012   • Aphasia 02/29/2016   • Eyelid abnormality 03/18/2014   • Hypothyroidism 12/17/2012   • Other hyperlipidemia 02/20/2019   • Chronic anticoagulation - concern for afib    • AIVR (accelerated idioventricular rhythm) (HCC) on Zio    • Hiatal hernia        Past Surgical History:  Past Surgical History:   Procedure Laterality Date   • PB LAP,ESOPHAGOGAST FUNDOPLASTY  7/16/2019    Procedure: FUNDOPLICATION, NISSEN, LAPAROSCOPIC- REPAIR PARAESOPHAGEAL HIATAL HERNIA WITH MESH;  Surgeon: John H Ganser, M.D.;  Location: SURGERY [facility];  Service: General   • RECOVERY  7/13/2016    Procedure: CATH LAB-PVC ABLATION WAGNER-REGAN -CARTO;  Surgeon: [facility];  [place] [facility] [place];  Service:    • OTHER ABDOMINAL SURGERY  2/2016    hiatal hernia repair, Dr Darnell   • FULL THICKNESS BLOCK RESECTION  3/18/2014    Performed by You Abbott M.D. at SURGERY SURGICAL [facility]   • OTHER  2013    bladder sling    • OTHER ABDOMINAL SURGERY  2013    cholecystectomy   • CATARACT EXTRACTION WITH IOL Bilateral 2008   • LUMPECTOMY Right 1992    right breast   • THYROID LOBECTOMY  1976   • OTHER  1975    D&C and tubal ligation    • ZZZ CARDIAC CATH      ablation        Allergies:  Patient has no known allergies.     Current Medications:    Current Outpatient Medications:   •  DAILY MULTIPLE VITAMINS PO, Take  by  mouth., Disp: , Rfl:   •  ELIQUIS 5 MG Tab, TAKE 1 TABLET BY MOUTH  TWICE DAILY, Disp: 180 Tab, Rfl: 2  •  omega-3 acid ethyl esters (LOVAZA) 1 GM capsule, , Disp: , Rfl:   •  levothyroxine (SYNTHROID) 50 MCG Tab, Take 50 mcg by mouth Every morning on an empty stomach., Disp: , Rfl:   •  atorvastatin (LIPITOR) 20 MG Tab, Take 1 Tab by mouth every bedtime., Disp: 90 Tab, Rfl: 3  •  omeprazole (PRILOSEC) 40 MG delayed-release capsule, Take 40 mg by mouth every day., Disp: , Rfl:   •  vitamin D (CHOLECALCIFEROL) 1000 UNIT Tab, Take 4,000 Units by mouth every day., Disp: , Rfl:   •  liothyronine (CYTOMEL) 5 MCG TABS, Take 5 mcg by mouth every morning., Disp: , Rfl:     Social History:  Social History     Socioeconomic History   • Marital status:      Spouse name: Not on file   • Number of children: Not on file   • Years of education: Not on file   • Highest education level: Not on file   Occupational History   • Not on file   Tobacco Use   • Smoking status: Former Smoker     Packs/day: 0.20     Years: 20.00     Pack years: 4.00     Types: Cigarettes     Quit date: 1980     Years since quittin.3   • Smokeless tobacco: Never Used   Substance and Sexual Activity   • Alcohol use: Yes     Comment: 3 per week    • Drug use: No   • Sexual activity: Not on file   Other Topics Concern   • Not on file   Social History Narrative   • Not on file     Social Determinants of Health     Financial Resource Strain:    • Difficulty of Paying Living Expenses:    Food Insecurity:    • Worried About Running Out of Food in the Last Year:    • Ran Out of Food in the Last Year:    Transportation Needs:    • Lack of Transportation (Medical):    • Lack of Transportation (Non-Medical):    Physical Activity:    • Days of Exercise per Week:    • Minutes of Exercise per Session:    Stress:    • Feeling of Stress :    Social Connections:    • Frequency of Communication with Friends and Family:    • Frequency of Social Gatherings with  "Friends and Family:    • Attends Orthodox Services:    • Active Member of Clubs or Organizations:    • Attends Club or Organization Meetings:    • Marital Status:    Intimate Partner Violence:    • Fear of Current or Ex-Partner:    • Emotionally Abused:    • Physically Abused:    • Sexually Abused:         Family History:   Family History   Problem Relation Age of Onset   • Cancer Mother          PHYSICAL EXAM:   Vital signs: /68 (BP Location: Left arm, Patient Position: Sitting, BP Cuff Size: Adult)   Pulse 75   Ht 1.778 m (5' 10\")   Wt 87 kg (191 lb 12.8 oz)   BMI 27.52 kg/m²   GENERAL: Well-developed, well-nourished  in no apparent distress.   EYE: No ocular and eyelid asymmetry, Anicteric sclerae,  PERRL  HENT: Hearing grossly intact, Normocephalic, atraumatic. Pink, moist mucous membranes, No exudate  NECK: Supple. Trachea midline. Well healed incision mid sternal notch line.  CARDIOVASCULAR: Regular rate and rhythm. No murmurs, rubs, or gallops.   LUNGS: Clear to auscultation bilaterally   ABDOMEN: Soft, nontender with positive bowel sounds.   EXTREMITIES: No clubbing, cyanosis, or edema.   NEUROLOGICAL: Cranial nerves II-XII are grossly intact   Symmetric reflexes at the patella no proximal muscle weakness  LYMPH: No cervical, supraclavicular,  adenopathy palpated.   SKIN: No rashes, lesions. Turgor is normal.    ASSESSMENT/PLAN:   1. Hypothyroidism, unspecified type  Stable.  Continue liothyronine 5 mcg daily and levothyroxine 50 mcg daily.  Ultrasound of the neck is ordered for updated imaging and to further determine which she will be surgically removed.  We will have antibodies to rule out autoimmune thyroid process.    Future labs:  - FREE THYROXINE; Future  - T3 FREE; Future  - TSH; Future  - THYROID PEROXIDASE  (TPO) AB; Future  - VITAMIN D,25 HYDROXY; Future  - VITAMIN B12; Future  - Comp Metabolic Panel; Future  - US-SOFT TISSUES OF HEAD - NECK; Future    2. H/O partial " thyroidectomy  Stable.  As per plan #1.        Thank you kindly for allowing me to participate in the thyroid care plan for this patient.    Elayne Phillip, APRN  04/28/21    CC:   [facility]

## 2021-05-03 RX ORDER — OMEPRAZOLE 20 MG/1
CAPSULE, DELAYED RELEASE ORAL
COMMUNITY
Start: 2021-02-19 | End: 2021-08-03

## 2021-05-21 ENCOUNTER — HOSPITAL ENCOUNTER (OUTPATIENT)
Dept: RADIOLOGY | Facility: MEDICAL CENTER | Age: 76
End: 2021-05-21
Attending: NURSE PRACTITIONER
Payer: MEDICARE

## 2021-05-21 DIAGNOSIS — E03.9 HYPOTHYROIDISM, UNSPECIFIED TYPE: ICD-10-CM

## 2021-05-21 PROCEDURE — 76536 US EXAM OF HEAD AND NECK: CPT

## 2021-05-25 ENCOUNTER — TELEPHONE (OUTPATIENT)
Dept: ENDOCRINOLOGY | Facility: MEDICAL CENTER | Age: 76
End: 2021-05-25

## 2021-05-25 DIAGNOSIS — E04.2 MULTIPLE THYROID NODULES: ICD-10-CM

## 2021-05-25 NOTE — TELEPHONE ENCOUNTER
Provider called patient and her daughter Ping to explain results of thyroid ultrasound.  Upon review of imaging left thyroid nodule greater than 1 cm and irregular, but isoechoic.  Recommend FNA biopsy with Afirma to be completed.  Explained this procedure and the turnaround of results with daughter and patient.

## 2021-06-08 ENCOUNTER — HOSPITAL ENCOUNTER (OUTPATIENT)
Dept: RADIOLOGY | Facility: MEDICAL CENTER | Age: 76
End: 2021-06-08
Attending: NURSE PRACTITIONER
Payer: MEDICARE

## 2021-06-08 DIAGNOSIS — E04.2 MULTIPLE THYROID NODULES: ICD-10-CM

## 2021-06-08 PROCEDURE — 88112 CYTOPATH CELL ENHANCE TECH: CPT

## 2021-06-08 PROCEDURE — 10005 FNA BX W/US GDN 1ST LES: CPT

## 2021-06-08 NOTE — PROGRESS NOTES
[facility] Nursing Note:    Procedure RN: Rosa Sebastian    US guided left thyroid nodule fine needle aspiration done by Dr. Berry; left anterior aspect of neck access site CDI with a bandaid; 1 jar of cytolyt obtained and sent to pathology lab; pt tolerated the procedure well; NO sedation NO H&P required; all questions and concerns answered prior to being d/c; patient provided with appropriate education for procedure; pt d/c home.

## 2021-06-09 LAB — CYTOLOGY REG CYTOL: NORMAL

## 2021-07-19 ENCOUNTER — PATIENT MESSAGE (OUTPATIENT)
Dept: CARDIOLOGY | Facility: MEDICAL CENTER | Age: 76
End: 2021-07-19

## 2021-07-19 NOTE — PATIENT COMMUNICATION
Received fax from [facility] (P: [phone] F: [phone]) requesting:    - cardiac clearance for upcoming colonoscopy and EGD scheduled, TBD .  - Eliquis hold prior to procedure.    Clearance request relayed to MD for advise.  Fax sent to [facility] for reference via [facility], completed status received.

## 2021-07-20 ENCOUNTER — TELEPHONE (OUTPATIENT)
Dept: ENDOCRINOLOGY | Facility: MEDICAL CENTER | Age: 76
End: 2021-07-20

## 2021-07-20 NOTE — PROGRESS NOTES
She is safe to proceed, no testing required, hold anticoagulation as necessary.    It is my pleasure to participate in the care of Ms. Stuart.  Please do not hesitate to contact me with questions or concerns. [facility] is available 24/7 for consultative services at [phone] in the perioperative period.    Electronically Signed    Mir Carvajal MD PhD [facility]  Cardiologist [facility]    Please note that this dictation was created using voice recognition software. There may be errors I did not discover before finalizing the note.

## 2021-07-21 ENCOUNTER — HOSPITAL ENCOUNTER (OUTPATIENT)
Dept: LAB | Facility: MEDICAL CENTER | Age: 76
End: 2021-07-21
Attending: NURSE PRACTITIONER
Payer: MEDICARE

## 2021-07-21 DIAGNOSIS — E03.9 HYPOTHYROIDISM, UNSPECIFIED TYPE: ICD-10-CM

## 2021-07-21 LAB
ALBUMIN SERPL BCP-MCNC: 4.4 G/DL (ref 3.2–4.9)
ALBUMIN/GLOB SERPL: 1.8 G/DL
ALP SERPL-CCNC: 92 U/L (ref 30–99)
ALT SERPL-CCNC: 16 U/L (ref 2–50)
ANION GAP SERPL CALC-SCNC: 12 MMOL/L (ref 7–16)
AST SERPL-CCNC: 24 U/L (ref 12–45)
BILIRUB SERPL-MCNC: 1.4 MG/DL (ref 0.1–1.5)
BUN SERPL-MCNC: 21 MG/DL (ref 8–22)
CALCIUM SERPL-MCNC: 9.2 MG/DL (ref 8.5–10.5)
CHLORIDE SERPL-SCNC: 101 MMOL/L (ref 96–112)
CO2 SERPL-SCNC: 25 MMOL/L (ref 20–33)
CREAT SERPL-MCNC: 0.87 MG/DL (ref 0.5–1.4)
GLOBULIN SER CALC-MCNC: 2.4 G/DL (ref 1.9–3.5)
GLUCOSE SERPL-MCNC: 89 MG/DL (ref 65–99)
POTASSIUM SERPL-SCNC: 3.9 MMOL/L (ref 3.6–5.5)
PROT SERPL-MCNC: 6.8 G/DL (ref 6–8.2)
SODIUM SERPL-SCNC: 138 MMOL/L (ref 135–145)
T3FREE SERPL-MCNC: 2.2 PG/ML (ref 2–4.4)
T4 FREE SERPL-MCNC: 1.11 NG/DL (ref 0.93–1.7)
TSH SERPL DL<=0.005 MIU/L-ACNC: 1.25 UIU/ML (ref 0.38–5.33)
VIT B12 SERPL-MCNC: 1142 PG/ML (ref 211–911)

## 2021-07-21 PROCEDURE — 80053 COMPREHEN METABOLIC PANEL: CPT

## 2021-07-21 PROCEDURE — 84439 ASSAY OF FREE THYROXINE: CPT

## 2021-07-21 PROCEDURE — 84481 FREE ASSAY (FT-3): CPT

## 2021-07-21 PROCEDURE — 86376 MICROSOMAL ANTIBODY EACH: CPT

## 2021-07-21 PROCEDURE — 82607 VITAMIN B-12: CPT

## 2021-07-21 PROCEDURE — 84443 ASSAY THYROID STIM HORMONE: CPT

## 2021-07-21 PROCEDURE — 36415 COLL VENOUS BLD VENIPUNCTURE: CPT

## 2021-07-21 NOTE — PATIENT COMMUNICATION
Telephone note signed by MD printed and faxed to, [phone], completed status.    Getui message sent to pt regarding MD recommendations.

## 2021-07-24 LAB — THYROPEROXIDASE AB SERPL-ACNC: <0.3 IU/ML (ref 0–9)

## 2021-07-27 RX ORDER — POLYETHYLENE GLYCOL 3350, SODIUM CHLORIDE, SODIUM BICARBONATE, POTASSIUM CHLORIDE 420; 11.2; 5.72; 1.48 G/4L; G/4L; G/4L; G/4L
POWDER, FOR SOLUTION ORAL
COMMUNITY
Start: 2021-07-09 | End: 2021-08-03

## 2021-07-28 ENCOUNTER — OFFICE VISIT (OUTPATIENT)
Dept: ENDOCRINOLOGY | Facility: MEDICAL CENTER | Age: 76
End: 2021-07-28
Attending: NURSE PRACTITIONER
Payer: MEDICARE

## 2021-07-28 VITALS
SYSTOLIC BLOOD PRESSURE: 112 MMHG | RESPIRATION RATE: 15 BRPM | WEIGHT: 198 LBS | HEART RATE: 61 BPM | BODY MASS INDEX: 28.35 KG/M2 | DIASTOLIC BLOOD PRESSURE: 72 MMHG | OXYGEN SATURATION: 99 % | HEIGHT: 70 IN

## 2021-07-28 DIAGNOSIS — E03.9 HYPOTHYROIDISM, UNSPECIFIED TYPE: ICD-10-CM

## 2021-07-28 DIAGNOSIS — E55.9 VITAMIN D DEFICIENCY: ICD-10-CM

## 2021-07-28 DIAGNOSIS — E04.2 MULTIPLE THYROID NODULES: ICD-10-CM

## 2021-07-28 PROCEDURE — 99211 OFF/OP EST MAY X REQ PHY/QHP: CPT | Performed by: NURSE PRACTITIONER

## 2021-07-28 PROCEDURE — 99214 OFFICE O/P EST MOD 30 MIN: CPT | Performed by: NURSE PRACTITIONER

## 2021-07-28 ASSESSMENT — FIBROSIS 4 INDEX: FIB4 SCORE: 1.974358974358974359

## 2021-07-28 NOTE — PROGRESS NOTES
Chief Complaint: Follow-up evaluation of Hypothyroidism.     HPI:     Felipa Stuart is a very pleasant 75 y.o. female for continued evaluation & treatment of the followin.  Hypothyroidism  Diagnosed on , partial lobectomy secondary to nodules.   Patient is unsure which lobe of the thyroid was removed.  .      Currently taking levothyroxine 50mcg Daily and Liothyronine 5mcg Daily.  Patient takes thyroid hormone on an empty stomach 1 hour prior to meals.  Patient denies taking calcium, antacids and/or iron supplementation.  Patient denies cold intolerance, constipation or mental fogginess.  Denies lumps or enlargement in the neck. Denies difficulty swallowing.        Ref. Range 2021 14:48   TSH Latest Ref Range: 0.380 - 5.330 uIU/mL 1.250   Free T-4 Latest Ref Range: 0.93 - 1.70 ng/dL 1.11   T3,Free Latest Ref Range: 2.00 - 4.40 pg/mL 2.20   Microsomal -Tpo- Abs Latest Ref Range: 0.0 - 9.0 IU/mL <0.3       Ultrasound of neck was ordered after our initial appointment with FNA biopsy completed..  2021: FINDINGS:  The thyroid gland is heterogeneous.  Vascularity is normal. The right lobe of the thyroid gland measures 1.24 cm x 2.16 cm x 0.78 cm. The left lobe of the thyroid gland measures 1.24 cm x 4.29 cm x 1.39 cm. The isthmus measures 0.11 cm.  Nodule #1 Location:  Right mid Size:  1.0 x 0.7 x 0.7 cm Composition:  Solid-2 Echogenicity:  Isoechoic-1 Shape:  Wider than tall-0 Margins:  Smooth-0 Echogenic Foci:  None-0  ACR TIRADS points/category:  3 - TR3 - Mildly Suspicious    Nodule #2 Location:  Left  lower Size:  1.5 x 1.4 x 0.6 cm Composition:  Solid-2 Echogenicity:  Isoechoic-1 Shape:  Wider than tall-0 Margins:  Irregular-2 Echogenic Foci:  Microscopic-3  ACR TIRADS points/category:  8 - TR5 - Highly Suspicious    FNA biopsy completed 2021: A. Left thyroid fine needle aspiration:  [place] category 2: benign. The specimen is mildly to moderately cellular and  shows follicular cell groups lacking significant cytologic or architectural atypia. Abundant background colloid is noted.   Comment: These findings correlate with the benign category of the   [place] System for classification of thyroid cytopathology. This   category can demonstrate up to a 3% risk of malignancy. Clinical   correlation recommended.     Patient's medications, allergies, and social histories were reviewed and updated as appropriate.      ROS:     CONS:     No fever, no chills, no weight loss, no fatigue   EYES:      No diplopia, no blurry vision, no redness of eyes, no swelling of eyelids   ENT:    No hearing loss, No ear pain, No sore throat, no dysphagia, no neck swelling   CV:     No chest pain, no palpitations, no claudication, no orthopnea, no PND   PULM:    No SOB, no cough, no hemoptysis, no wheezing    GI:   No nausea, no vomiting, no diarrhea, no constipation, no bloody stools   :  Passing urine well, no dysuria, no hematuria   ENDO:   No polyuria, no polydipsia, no heat intolerance, no cold intolerance   NEURO: No headaches, no dizziness, no convulsions, no tremors   MUSC:  No joint swellings, no arthralgias, no myalgias, no weakness   SKIN:   No rash, no ulcers, no dry skin   PSYCH:   No depression, no anxiety, no difficulty sleeping       Past Medical History:  Patient Active Problem List    Diagnosis Date Noted   • Other hyperlipidemia 02/20/2019   • Chronic anticoagulation - concern for afib    • AIVR (accelerated idioventricular rhythm) (HCC) on Zio    • Hiatal hernia    • S/P ablation of ventricular arrhythmia 07/28/2016   • Decreased GFR 07/28/2016   • VPC's (ventricular premature complexes) 07/13/2016   • Aphasia 02/29/2016   • Cerebral infarction (HCC) 02/28/2016   • Eyelid abnormality 03/18/2014   • TIA (transient ischemic attack) 07/12/2013   • Hypothyroidism 12/17/2012   • Bradycardia 12/17/2012   • Ventricular bigeminy 12/17/2012       Past Surgical History:  Past Surgical  History:   Procedure Laterality Date   • PB LAP,ESOPHAGOGAST FUNDOPLASTY  7/16/2019    Procedure: FUNDOPLICATION, NISSEN, LAPAROSCOPIC- REPAIR PARAESOPHAGEAL HIATAL HERNIA WITH MESH;  Surgeon: John H Ganser, M.D.;  Location: SURGERY [facility];  Service: General   • RECOVERY  7/13/2016    Procedure: CATH LAB-PVC ABLATION WAGNER-REGAN -CARTO;  Surgeon: [facility]ons[facility];  [place] [facility] [place]:    • OTHER ABDOMINAL SURGERY  2/2016    hiatal hernia repair, Dr Darnell   • FULL THICKNESS BLOCK RESECTION  3/18/2014    Performed by You Abbott M.D. at SURGERY [facility]S   • OTHER  2013    bladder sling    • OTHER ABDOMINAL SURGERY  2013    cholecystectomy   • CATARACT EXTRACTION WITH IOL Bilateral 2008   • LUMPECTOMY Right 1992    right breast   • THYROID LOBECTOMY  1976   • OTHER  1975    D&C and tubal ligation    • ZZZ CARDIAC CATH      ablation        Allergies:  Patient has no known allergies.     Current Medications:    Current Outpatient Medications:   •  NON SPECIFIED, , Disp: , Rfl:   •  DAILY MULTIPLE VITAMINS PO, Take  by mouth., Disp: , Rfl:   •  ELIQUIS 5 MG Tab, TAKE 1 TABLET BY MOUTH  TWICE DAILY, Disp: 180 Tab, Rfl: 2  •  omega-3 acid ethyl esters (LOVAZA) 1 GM capsule, , Disp: , Rfl:   •  levothyroxine (SYNTHROID) 50 MCG Tab, Take 50 mcg by mouth Every morning on an empty stomach., Disp: , Rfl:   •  atorvastatin (LIPITOR) 20 MG Tab, Take 1 Tab by mouth every bedtime., Disp: 90 Tab, Rfl: 3  •  omeprazole (PRILOSEC) 40 MG delayed-release capsule, Take 40 mg by mouth every day., Disp: , Rfl:   •  vitamin D (CHOLECALCIFEROL) 1000 UNIT Tab, Take 4,000 Units by mouth every day., Disp: , Rfl:   •  liothyronine (CYTOMEL) 5 MCG TABS, Take 5 mcg by mouth every morning., Disp: , Rfl:   •  polyethylene glycol-electrolytes (NULYTELY) 420 GM solution, TAKE 420 GRAMS BY MOUTH EVERY DAY (Patient not taking: Reported on 7/28/2021), Disp: , Rfl:   •  omeprazole (PRILOSEC) 20  "MG delayed-release capsule, , Disp: , Rfl:     Social History:  Social History     Socioeconomic History   • Marital status:      Spouse name: Not on file   • Number of children: Not on file   • Years of education: Not on file   • Highest education level: Not on file   Occupational History   • Not on file   Tobacco Use   • Smoking status: Former Smoker     Packs/day: 0.20     Years: 20.00     Pack years: 4.00     Types: Cigarettes     Quit date: 1980     Years since quittin.6   • Smokeless tobacco: Never Used   Vaping Use   • Vaping Use: Never used   Substance and Sexual Activity   • Alcohol use: Yes     Comment: 3 per week    • Drug use: No   • Sexual activity: Not on file   Other Topics Concern   • Not on file   Social History Narrative   • Not on file     Social Determinants of Health     Financial Resource Strain:    • Difficulty of Paying Living Expenses:    Food Insecurity:    • Worried About Running Out of Food in the Last Year:    • Ran Out of Food in the Last Year:    Transportation Needs:    • Lack of Transportation (Medical):    • Lack of Transportation (Non-Medical):    Physical Activity:    • Days of Exercise per Week:    • Minutes of Exercise per Session:    Stress:    • Feeling of Stress :    Social Connections:    • Frequency of Communication with Friends and Family:    • Frequency of Social Gatherings with Friends and Family:    • Attends Anabaptism Services:    • Active Member of Clubs or Organizations:    • Attends Club or Organization Meetings:    • Marital Status:    Intimate Partner Violence:    • Fear of Current or Ex-Partner:    • Emotionally Abused:    • Physically Abused:    • Sexually Abused:         Family History:   Family History   Problem Relation Age of Onset   • Cancer Mother          PHYSICAL EXAM:   Vital signs: /72 (BP Location: Left arm, Patient Position: Sitting, BP Cuff Size: Adult)   Pulse 61   Resp 15   Ht 1.778 m (5' 10\")   Wt 89.8 kg (198 lb)   " SpO2 99%   BMI 28.41 kg/m²   GENERAL: Well-developed, well-nourished  in no apparent distress.   EYE: No ocular and eyelid asymmetry, Anicteric sclerae,  PERRL  HENT: Hearing grossly intact, Normocephalic, atraumatic. Pink, moist mucous membranes, No exudate  NECK: Supple. Trachea midline. Well healed incision mid sternal notch line.  CARDIOVASCULAR: Regular rate and rhythm. No murmurs, rubs, or gallops.   LUNGS: Clear to auscultation bilaterally   ABDOMEN: Soft, nontender with positive bowel sounds.   EXTREMITIES: No clubbing, cyanosis, or edema.   NEUROLOGICAL: Cranial nerves II-XII are grossly intact   Symmetric reflexes at the patella no proximal muscle weakness  LYMPH: No cervical, supraclavicular,  adenopathy palpated.   SKIN: No rashes, lesions. Turgor is normal.    ASSESSMENT/PLAN:   1. Hypothyroidism, unspecified type  Stable.  Continue liothyronine 5 mcg daily and levothyroxine 50 mcg daily.  TPO Abs negative.   US of Thyroid confirmed bilateral thyroid lobes are present. Nodules were surgically removed 40 years ago but the gland is intact.     2. H/O partial thyroidectomy  Stable.  As per plan #1.  Ultrasound of thyroid with FNA biopsy completed, Everything is stable at this time.    Complete labs 1-2 weeks before next appointment.   Next appointment in 6 months.     Thank you kindly for allowing me to participate in the thyroid care plan for this patient.    Elayne Phillip, APRN  07/28/2021    CC:   LONG Crane

## 2021-11-09 ENCOUNTER — HOSPITAL ENCOUNTER (OUTPATIENT)
Dept: RADIOLOGY | Facility: MEDICAL CENTER | Age: 76
End: 2021-11-09
Attending: NURSE PRACTITIONER
Payer: MEDICARE

## 2021-11-09 DIAGNOSIS — Z12.31 ENCOUNTER FOR SCREENING MAMMOGRAM FOR MALIGNANT NEOPLASM OF BREAST: ICD-10-CM

## 2021-11-09 PROCEDURE — 77063 BREAST TOMOSYNTHESIS BI: CPT

## 2021-12-18 DIAGNOSIS — Z98.890 S/P ABLATION OF VENTRICULAR ARRHYTHMIA: ICD-10-CM

## 2021-12-18 DIAGNOSIS — Z86.79 S/P ABLATION OF VENTRICULAR ARRHYTHMIA: ICD-10-CM

## 2021-12-18 DIAGNOSIS — Z79.01 CHRONIC ANTICOAGULATION: ICD-10-CM

## 2022-01-18 ENCOUNTER — OFFICE VISIT (OUTPATIENT)
Dept: CARDIOLOGY | Facility: MEDICAL CENTER | Age: 77
End: 2022-01-18
Payer: MEDICARE

## 2022-01-18 VITALS
DIASTOLIC BLOOD PRESSURE: 74 MMHG | HEIGHT: 70 IN | SYSTOLIC BLOOD PRESSURE: 110 MMHG | BODY MASS INDEX: 28.2 KG/M2 | OXYGEN SATURATION: 99 % | HEART RATE: 69 BPM | WEIGHT: 197 LBS | RESPIRATION RATE: 14 BRPM

## 2022-01-18 DIAGNOSIS — I44.2 AIVR (ACCELERATED IDIOVENTRICULAR RHYTHM) (HCC): Chronic | ICD-10-CM

## 2022-01-18 DIAGNOSIS — Z79.01 CHRONIC ANTICOAGULATION: Chronic | ICD-10-CM

## 2022-01-18 DIAGNOSIS — G45.9 TIA (TRANSIENT ISCHEMIC ATTACK): ICD-10-CM

## 2022-01-18 DIAGNOSIS — I49.8 VENTRICULAR BIGEMINY: ICD-10-CM

## 2022-01-18 DIAGNOSIS — I49.3 VPC'S (VENTRICULAR PREMATURE COMPLEXES): ICD-10-CM

## 2022-01-18 DIAGNOSIS — Z86.79 S/P ABLATION OF VENTRICULAR ARRHYTHMIA: ICD-10-CM

## 2022-01-18 DIAGNOSIS — Z98.890 S/P ABLATION OF VENTRICULAR ARRHYTHMIA: ICD-10-CM

## 2022-01-18 DIAGNOSIS — R00.1 BRADYCARDIA: ICD-10-CM

## 2022-01-18 PROCEDURE — 99214 OFFICE O/P EST MOD 30 MIN: CPT | Performed by: INTERNAL MEDICINE

## 2022-01-18 RX ORDER — OMEPRAZOLE 20 MG/1
20 CAPSULE, DELAYED RELEASE ORAL DAILY
COMMUNITY
Start: 2021-12-31

## 2022-01-18 ASSESSMENT — FIBROSIS 4 INDEX: FIB4 SCORE: 1.974358974358974359

## 2022-01-18 NOTE — PROGRESS NOTES
Chief Complaint   Patient presents with   • Premature Ventricular Contractions (PVCs)     F/V Dx: VPC's (ventricular premature complexes)       Subjective     Felipa Stuart is a 76 y.o. female who presents today for follow-up of her history of PVCs PVC ablation concern for JOHANN fib on chronic anticoagulation  She has been doing over the past year tolerating medications well    Past Medical History:   Diagnosis Date   • AIVR (accelerated idioventricular rhythm) (HCC) on Zio    • Anesthesia     post op nausea   • Arrhythmia     bigeminal PVC's, SVT with questionable Afib   • Bradycardia    • CATARACT     bianka IOL   • Chicken pox     as child    • Chronic anticoagulation - concern for afib    • Heart burn    • Hiatal hernia    • Hiatus hernia syndrome     repaired 2/28/2016   • HX: benign breast biopsy     Rt breast   • Hypothyroidism    • Measles     as child    • PONV (postoperative nausea and vomiting)    • Scarlet fever     as child    • Stroke (HCC) 2/28/2016    mild speech delay   • TIA (transient ischemic attack) 06/11/2013     Past Surgical History:   Procedure Laterality Date   • OH LAP,ESOPHAGOGAST FUNDOPLASTY  7/16/2019    Procedure: FUNDOPLICATION, NISSEN, LAPAROSCOPIC- REPAIR PARAESOPHAGEAL HIATAL HERNIA WITH MESH;  Surgeon: John H Ganser, M.D.;  Location: SURGERY [facility];  Service: General   • RECOVERY  7/13/2016    Procedure: CATH LAB-PVC ABLATION WAGNER-REGAN -CARTO;  Surgeon: Recoveryonly Surgery;  [place] [facility] [place]  Service:    • OTHER ABDOMINAL SURGERY  2/2016    hiatal hernia repair, Dr Darnell   • FULL THICKNESS BLOCK RESECTION  3/18/2014    Performed by You Abbott M.D. at SURGERY SURGICAL [facility] [place] ORS   • OTHER  2013    bladder sling    • OTHER ABDOMINAL SURGERY  2013    cholecystectomy   • CATARACT EXTRACTION WITH IOL Bilateral 2008   • LUMPECTOMY Right 1992    right breast   • THYROID LOBECTOMY  1976   • OTHER  1975    D&C and tubal ligation    • ZZZ  CARDIAC CATH      ablation     Family History   Problem Relation Age of Onset   • Cancer Mother      Social History     Socioeconomic History   • Marital status:      Spouse name: Not on file   • Number of children: Not on file   • Years of education: Not on file   • Highest education level: Not on file   Occupational History   • Not on file   Tobacco Use   • Smoking status: Former Smoker     Packs/day: 0.20     Years: 20.00     Pack years: 4.00     Types: Cigarettes     Quit date: 1980     Years since quittin.0   • Smokeless tobacco: Never Used   Vaping Use   • Vaping Use: Never used   Substance and Sexual Activity   • Alcohol use: Yes     Comment: 3 per week    • Drug use: No   • Sexual activity: Not on file   Other Topics Concern   • Not on file   Social History Narrative   • Not on file     Social Determinants of Health     Financial Resource Strain:    • Difficulty of Paying Living Expenses: Not on file   Food Insecurity:    • Worried About Running Out of Food in the Last Year: Not on file   • Ran Out of Food in the Last Year: Not on file   Transportation Needs:    • Lack of Transportation (Medical): Not on file   • Lack of Transportation (Non-Medical): Not on file   Physical Activity:    • Days of Exercise per Week: Not on file   • Minutes of Exercise per Session: Not on file   Stress:    • Feeling of Stress : Not on file   Social Connections:    • Frequency of Communication with Friends and Family: Not on file   • Frequency of Social Gatherings with Friends and Family: Not on file   • Attends Buddhist Services: Not on file   • Active Member of Clubs or Organizations: Not on file   • Attends Club or Organization Meetings: Not on file   • Marital Status: Not on file   Intimate Partner Violence:    • Fear of Current or Ex-Partner: Not on file   • Emotionally Abused: Not on file   • Physically Abused: Not on file   • Sexually Abused: Not on file   Housing Stability:    • Unable to Pay for Housing  "in the Last Year: Not on file   • Number of Places Lived in the Last Year: Not on file   • Unstable Housing in the Last Year: Not on file     No Known Allergies  Outpatient Encounter Medications as of 1/18/2022   Medication Sig Dispense Refill   • omeprazole (PRILOSEC) 20 MG delayed-release capsule      • TURMERIC PO Take  by mouth.     • apixaban (ELIQUIS) 5mg Tab Take 1 Tablet by mouth 2 times a day. Please keep 01/18/22 appointment to ensure future refills. 60 Tablet 0   • NON SPECIFIED      • DAILY MULTIPLE VITAMINS PO Take  by mouth.     • omega-3 acid ethyl esters (LOVAZA) 1 GM capsule      • levothyroxine (SYNTHROID) 50 MCG Tab Take 50 mcg by mouth Every morning on an empty stomach.     • atorvastatin (LIPITOR) 20 MG Tab Take 1 Tab by mouth every bedtime. 90 Tab 3   • vitamin D (CHOLECALCIFEROL) 1000 UNIT Tab Take 4,000 Units by mouth every day.     • liothyronine (CYTOMEL) 5 MCG TABS Take 5 mcg by mouth every morning.     • [DISCONTINUED] omeprazole (PRILOSEC) 40 MG delayed-release capsule Take 40 mg by mouth every day. (Patient not taking: Reported on 1/18/2022)       No facility-administered encounter medications on file as of 1/18/2022.     ROS           Objective     /74 (BP Location: Left arm, Patient Position: Sitting, BP Cuff Size: Adult)   Pulse 69   Resp 14   Ht 1.778 m (5' 10\")   Wt 89.4 kg (197 lb)   SpO2 99%   BMI 28.27 kg/m²     Physical Exam  Constitutional:       General: She is not in acute distress.     Appearance: She is not diaphoretic.   Eyes:      General: No scleral icterus.  Neck:      Vascular: No JVD.   Cardiovascular:      Rate and Rhythm: Normal rate.      Heart sounds: Normal heart sounds. No murmur heard.  No friction rub. No gallop.    Pulmonary:      Effort: No respiratory distress.      Breath sounds: No wheezing or rales.   Abdominal:      General: Bowel sounds are normal.      Palpations: Abdomen is soft.   Skin:     Findings: No rash.   Neurological:      Mental " Status: She is alert.              We reviewed in person the most recent labs  Recent Results (from the past 5040 hour(s))   Comp Metabolic Panel    Collection Time: 07/21/21  2:48 PM   Result Value Ref Range    Sodium 138 135 - 145 mmol/L    Potassium 3.9 3.6 - 5.5 mmol/L    Chloride 101 96 - 112 mmol/L    Co2 25 20 - 33 mmol/L    Anion Gap 12.0 7.0 - 16.0    Glucose 89 65 - 99 mg/dL    Bun 21 8 - 22 mg/dL    Creatinine 0.87 0.50 - 1.40 mg/dL    Calcium 9.2 8.5 - 10.5 mg/dL    AST(SGOT) 24 12 - 45 U/L    ALT(SGPT) 16 2 - 50 U/L    Alkaline Phosphatase 92 30 - 99 U/L    Total Bilirubin 1.4 0.1 - 1.5 mg/dL    Albumin 4.4 3.2 - 4.9 g/dL    Total Protein 6.8 6.0 - 8.2 g/dL    Globulin 2.4 1.9 - 3.5 g/dL    A-G Ratio 1.8 g/dL   VITAMIN B12    Collection Time: 07/21/21  2:48 PM   Result Value Ref Range    Vitamin B12 -True Cobalamin 1142 (H) 211 - 911 pg/mL   TSH    Collection Time: 07/21/21  2:48 PM   Result Value Ref Range    TSH 1.250 0.380 - 5.330 uIU/mL   T3 FREE    Collection Time: 07/21/21  2:48 PM   Result Value Ref Range    T3,Free 2.20 2.00 - 4.40 pg/mL   FREE THYROXINE    Collection Time: 07/21/21  2:48 PM   Result Value Ref Range    Free T-4 1.11 0.93 - 1.70 ng/dL   THYROID PEROX AB TPO (REFLEX)    Collection Time: 07/21/21  2:48 PM   Result Value Ref Range    Microsomal -Tpo- Abs <0.3 0.0 - 9.0 IU/mL   ESTIMATED GFR    Collection Time: 07/21/21  2:48 PM   Result Value Ref Range    GFR If African American >60 >60 mL/min/1.73 m 2    GFR If Non African American >60 >60 mL/min/1.73 m 2       Assessment & Plan     1. Bradycardia     2. S/P ablation of ventricular arrhythmia     3. Ventricular bigeminy     4. VPC's (ventricular premature complexes)     5. AIVR (accelerated idioventricular rhythm) (HCC) on Zio     6. Chronic anticoagulation - concern for afib  Comp Metabolic Panel    CBC WITHOUT DIFFERENTIAL   7. TIA (transient ischemic attack)         Medical Decision Making: Today's Assessment/Status/Plan:          It was my pleasure to meet with Ms. Stuart.    We addressed the management of hypertension at today's visit. Blood pressure is well controlled.  We specifically assessed the labs on hypertension treatment    We addressed the management of dyslipidemia at today's visit. She is on appropriate statin.      May need knee replacement She can proceed with the proposed procedure or surgery, no modifiable cardiovascular risk, no further cardiac testing required, hold anticoagulation as necessary.    Please note that this dictation was created using voice recognition software. There may be errors I did not discover before finalizing the note.     We addressed the management of chronic anticoagulation at today's visit. She understands the risks and benefits of chronic anticoagulation.  We reviewed and verified the results of annual testing for anemia and kidney function.    I will see Ms. Stuart back in 1 year time and encouraged her to follow up with us over the phone or electronically using my MyChart as issues arise.    It is my pleasure to participate in the care of Ms. Stuart.  Please do not hesitate to contact me with questions or concerns.    Mir Carvajal MD PhD FAC  Cardiologist [facility]    Please note that this dictation was created using voice recognition software. There may be errors I did not discover before finalizing the note.

## 2022-01-19 ENCOUNTER — HOSPITAL ENCOUNTER (OUTPATIENT)
Dept: LAB | Facility: MEDICAL CENTER | Age: 77
End: 2022-01-19
Attending: NURSE PRACTITIONER
Payer: MEDICARE

## 2022-01-19 ENCOUNTER — HOSPITAL ENCOUNTER (OUTPATIENT)
Dept: LAB | Facility: MEDICAL CENTER | Age: 77
End: 2022-01-19
Attending: INTERNAL MEDICINE
Payer: MEDICARE

## 2022-01-19 DIAGNOSIS — E03.9 HYPOTHYROIDISM, UNSPECIFIED TYPE: ICD-10-CM

## 2022-01-19 DIAGNOSIS — E55.9 VITAMIN D DEFICIENCY: ICD-10-CM

## 2022-01-19 DIAGNOSIS — Z79.01 CHRONIC ANTICOAGULATION: Chronic | ICD-10-CM

## 2022-01-19 DIAGNOSIS — E04.2 MULTIPLE THYROID NODULES: ICD-10-CM

## 2022-01-19 LAB
25(OH)D3 SERPL-MCNC: 95 NG/ML (ref 30–100)
ALBUMIN SERPL BCP-MCNC: 4.7 G/DL (ref 3.2–4.9)
ALBUMIN/GLOB SERPL: 2.2 G/DL
ALP SERPL-CCNC: 75 U/L (ref 30–99)
ALT SERPL-CCNC: 15 U/L (ref 2–50)
ANION GAP SERPL CALC-SCNC: 11 MMOL/L (ref 7–16)
AST SERPL-CCNC: 19 U/L (ref 12–45)
BILIRUB SERPL-MCNC: 0.7 MG/DL (ref 0.1–1.5)
BUN SERPL-MCNC: 31 MG/DL (ref 8–22)
CALCIUM SERPL-MCNC: 9.2 MG/DL (ref 8.5–10.5)
CHLORIDE SERPL-SCNC: 104 MMOL/L (ref 96–112)
CO2 SERPL-SCNC: 24 MMOL/L (ref 20–33)
CREAT SERPL-MCNC: 0.77 MG/DL (ref 0.5–1.4)
ERYTHROCYTE [DISTWIDTH] IN BLOOD BY AUTOMATED COUNT: 47.2 FL (ref 35.9–50)
FASTING STATUS PATIENT QL REPORTED: NORMAL
GLOBULIN SER CALC-MCNC: 2.1 G/DL (ref 1.9–3.5)
GLUCOSE SERPL-MCNC: 89 MG/DL (ref 65–99)
HCT VFR BLD AUTO: 37.6 % (ref 37–47)
HGB BLD-MCNC: 11.9 G/DL (ref 12–16)
MCH RBC QN AUTO: 26.8 PG (ref 27–33)
MCHC RBC AUTO-ENTMCNC: 31.6 G/DL (ref 33.6–35)
MCV RBC AUTO: 84.7 FL (ref 81.4–97.8)
PLATELET # BLD AUTO: 268 K/UL (ref 164–446)
PMV BLD AUTO: 11.9 FL (ref 9–12.9)
POTASSIUM SERPL-SCNC: 3.9 MMOL/L (ref 3.6–5.5)
PROT SERPL-MCNC: 6.8 G/DL (ref 6–8.2)
RBC # BLD AUTO: 4.44 M/UL (ref 4.2–5.4)
SODIUM SERPL-SCNC: 139 MMOL/L (ref 135–145)
T3FREE SERPL-MCNC: 2.31 PG/ML (ref 2–4.4)
T4 FREE SERPL-MCNC: 1.17 NG/DL (ref 0.93–1.7)
TSH SERPL DL<=0.005 MIU/L-ACNC: 1.48 UIU/ML (ref 0.38–5.33)
WBC # BLD AUTO: 5.6 K/UL (ref 4.8–10.8)

## 2022-01-19 PROCEDURE — 80053 COMPREHEN METABOLIC PANEL: CPT

## 2022-01-19 PROCEDURE — 82306 VITAMIN D 25 HYDROXY: CPT

## 2022-01-19 PROCEDURE — 36415 COLL VENOUS BLD VENIPUNCTURE: CPT

## 2022-01-19 PROCEDURE — 85027 COMPLETE CBC AUTOMATED: CPT

## 2022-01-19 PROCEDURE — 84481 FREE ASSAY (FT-3): CPT

## 2022-01-19 PROCEDURE — 84443 ASSAY THYROID STIM HORMONE: CPT

## 2022-01-19 PROCEDURE — 84439 ASSAY OF FREE THYROXINE: CPT

## 2022-01-21 ENCOUNTER — PATIENT MESSAGE (OUTPATIENT)
Dept: CARDIOLOGY | Facility: MEDICAL CENTER | Age: 77
End: 2022-01-21

## 2022-01-21 NOTE — TELEPHONE ENCOUNTER
----- Message from Mir Carvajal M.D. sent at 1/20/2022 12:18 PM PST -----  Labs look good, please let her know     Thank you

## 2022-01-30 DIAGNOSIS — Z79.01 CHRONIC ANTICOAGULATION: ICD-10-CM

## 2022-01-30 DIAGNOSIS — Z98.890 S/P ABLATION OF VENTRICULAR ARRHYTHMIA: ICD-10-CM

## 2022-01-30 DIAGNOSIS — Z86.79 S/P ABLATION OF VENTRICULAR ARRHYTHMIA: ICD-10-CM

## 2022-02-01 ENCOUNTER — OFFICE VISIT (OUTPATIENT)
Dept: ENDOCRINOLOGY | Facility: MEDICAL CENTER | Age: 77
End: 2022-02-01
Attending: NURSE PRACTITIONER
Payer: MEDICARE

## 2022-02-01 VITALS
WEIGHT: 192 LBS | HEART RATE: 92 BPM | DIASTOLIC BLOOD PRESSURE: 74 MMHG | SYSTOLIC BLOOD PRESSURE: 110 MMHG | BODY MASS INDEX: 27.49 KG/M2 | HEIGHT: 70 IN | OXYGEN SATURATION: 99 %

## 2022-02-01 DIAGNOSIS — E55.9 VITAMIN D DEFICIENCY: ICD-10-CM

## 2022-02-01 DIAGNOSIS — E06.3 HYPOTHYROIDISM DUE TO HASHIMOTO'S THYROIDITIS: ICD-10-CM

## 2022-02-01 DIAGNOSIS — E03.8 HYPOTHYROIDISM DUE TO HASHIMOTO'S THYROIDITIS: ICD-10-CM

## 2022-02-01 DIAGNOSIS — E04.2 MULTIPLE THYROID NODULES: ICD-10-CM

## 2022-02-01 PROCEDURE — 99211 OFF/OP EST MAY X REQ PHY/QHP: CPT | Performed by: NURSE PRACTITIONER

## 2022-02-01 PROCEDURE — 99214 OFFICE O/P EST MOD 30 MIN: CPT | Performed by: NURSE PRACTITIONER

## 2022-02-01 ASSESSMENT — FIBROSIS 4 INDEX: FIB4 SCORE: 1.41

## 2022-02-01 NOTE — PROGRESS NOTES
Chief Complaint: Follow-up evaluation of Hypothyroidism.     HPI:     Felipa Stuart is a very pleasant 76 y.o. female for continued evaluation & treatment of the followin.  Acquired hypothyroidism  Patient reports she is doing well since her last appointment.  No new concerns or issues.  Has ongoing bilateral osteoarthritis to her knees and is planning on a left TKA in the next 6 to 12 months.  Diagnosed in  with hypothyroidism, status post thyroid nodule removal.  Bilateral thyroid lobes remain intact.    Currently taking levothyroxine 50mcg Daily and Liothyronine 5mcg Daily.  Patient takes thyroid hormone on an empty stomach 1 hour prior to meals.  Patient denies taking calcium, antacids and/or iron supplementation.  Patient denies cold intolerance, constipation or mental fogginess.  Denies lumps or enlargement in the neck. Denies difficulty swallowing.        Ref. Range 2022 09:56   TSH Latest Ref Range: 0.380 - 5.330 uIU/mL 1.480   Free T-4 Latest Ref Range: 0.93 - 1.70 ng/dL 1.17   T3,Free Latest Ref Range: 2.00 - 4.40 pg/mL 2.31         Ultrasound of neck was ordered after our initial appointment with FNA biopsy completed..  2021: FINDINGS:  The thyroid gland is heterogeneous.  Vascularity is normal. The right lobe of the thyroid gland measures 1.24 cm x 2.16 cm x 0.78 cm. The left lobe of the thyroid gland measures 1.24 cm x 4.29 cm x 1.39 cm. The isthmus measures 0.11 cm.  Nodule #1 Location:  Right mid Size:  1.0 x 0.7 x 0.7 cm Composition:  Solid-2 Echogenicity:  Isoechoic-1 Shape:  Wider than tall-0 Margins:  Smooth-0 Echogenic Foci:  None-0  ACR TIRADS points/category:  3 - TR3 - Mildly Suspicious    Nodule #2 Location:  Left  lower Size:  1.5 x 1.4 x 0.6 cm Composition:  Solid-2 Echogenicity:  Isoechoic-1 Shape:  Wider than tall-0 Margins:  Irregular-2 Echogenic Foci:  Microscopic-3  ACR TIRADS points/category:  8 - TR5 - Highly Suspicious    FNA biopsy completed  06/08/2021: A. Left thyroid fine needle aspiration:  [place] category 2: benign. The specimen is mildly to moderately cellular and shows follicular cell groups lacking significant cytologic or architectural atypia. Abundant background colloid is noted.   Comment: These findings correlate with the benign category of the   [place] System for classification of thyroid cytopathology. This   category can demonstrate up to a 3% risk of malignancy. Clinical   correlation recommended.     2.  Vitamin D deficiency  Currently taking vitamin D 4000 IU daily.       Ref. Range 1/19/2022 09:56   25-Hydroxy   Vitamin D 25 Latest Ref Range: 30 - 100 ng/mL 95       Patient's medications, allergies, and social histories were reviewed and updated as appropriate.      ROS:     CONS:     No fever, no chills, no weight loss, no fatigue   EYES:      No diplopia, no blurry vision, no redness of eyes, no swelling of eyelids   ENT:    No hearing loss, No ear pain, No sore throat, no dysphagia, no neck swelling   CV:     No chest pain, no palpitations, no claudication, no orthopnea, no PND   PULM:    No SOB, no cough, no hemoptysis, no wheezing    GI:   No nausea, no vomiting, no diarrhea, no constipation, no bloody stools   :  Passing urine well, no dysuria, no hematuria   ENDO:   No polyuria, no polydipsia, no heat intolerance, no cold intolerance   NEURO: No headaches, no dizziness, no convulsions, no tremors   MUSC:  No joint swellings, no arthralgias, no myalgias, no weakness   SKIN:   No rash, no ulcers, no dry skin   PSYCH:   No depression, no anxiety, no difficulty sleeping       Past Medical History:  Patient Active Problem List    Diagnosis Date Noted   • Other hyperlipidemia 02/20/2019   • Chronic anticoagulation - concern for afib    • AIVR (accelerated idioventricular rhythm) (HCC) on Zio    • Hiatal hernia    • S/P ablation of ventricular arrhythmia 07/28/2016   • Decreased GFR 07/28/2016   • VPC's (ventricular premature  complexes) 07/13/2016   • Aphasia 02/29/2016   • Cerebral infarction (HCC) 02/28/2016   • Eyelid abnormality 03/18/2014   • TIA (transient ischemic attack) 07/12/2013   • Hypothyroidism 12/17/2012   • Bradycardia 12/17/2012   • Ventricular bigeminy 12/17/2012       Past Surgical History:  Past Surgical History:   Procedure Laterality Date   • [place] LAP,ESOPHAGOGAST FUNDOPLASTY  7/16/2019    Procedure: FUNDOPLICATION, NISSEN, LAPAROSCOPIC- REPAIR PARAESOPHAGEAL HIATAL HERNIA WITH MESH;  Surgeon: John H Ganser, M.D.;  Location: SURGERY [facility];  Service: General   • RECOVERY  7/13/2016    Procedure: CATH LAB-PVC ABLATION WAGNER-REGAN -CARTO;  Surgeon: [facility] Surgery;  [place] [facility]  Service:    • OTHER ABDOMINAL SURGERY  2/2016    hiatal hernia repair, Dr Darnell   • FULL THICKNESS BLOCK RESECTION  3/18/2014    Performed by You Abbott M.D. at SUR[facility]ERY [facility] ORS   • OTHER  2013    bladder sling    • OTHER ABDOMINAL SURGERY  2013    cholecystectomy   • CATARACT EXTRACTION WITH IOL Bilateral 2008   • LUMPECTOMY Right 1992    right breast   • THYROID LOBECTOMY  1976   • OTHER  1975    D&C and tubal ligation    • ZZZ CARDIAC CATH      ablation        Allergies:  Patient has no known allergies.     Current Medications:    Current Outpatient Medications:   •  omeprazole (PRILOSEC) 20 MG delayed-release capsule, , Disp: , Rfl:   •  TURMERIC PO, Take  by mouth., Disp: , Rfl:   •  apixaban (ELIQUIS) 5mg Tab, Take 1 Tablet by mouth 2 times a day. Please keep 01/18/22 appointment to ensure future refills., Disp: 60 Tablet, Rfl: 0  •  NON SPECIFIED, , Disp: , Rfl:   •  DAILY MULTIPLE VITAMINS PO, Take  by mouth., Disp: , Rfl:   •  omega-3 acid ethyl esters (LOVAZA) 1 GM capsule, , Disp: , Rfl:   •  levothyroxine (SYNTHROID) 50 MCG Tab, Take 50 mcg by mouth Every morning on an empty stomach., Disp: , Rfl:   •  atorvastatin (LIPITOR) 20 MG Tab, Take 1 Tab by mouth every bedtime.,  Disp: 90 Tab, Rfl: 3  •  vitamin D (CHOLECALCIFEROL) 1000 UNIT Tab, Take 4,000 Units by mouth every day., Disp: , Rfl:   •  liothyronine (CYTOMEL) 5 MCG TABS, Take 5 mcg by mouth every morning., Disp: , Rfl:     Social History:  Social History     Socioeconomic History   • Marital status:      Spouse name: Not on file   • Number of children: Not on file   • Years of education: Not on file   • Highest education level: Not on file   Occupational History   • Not on file   Tobacco Use   • Smoking status: Former Smoker     Packs/day: 0.20     Years: 20.00     Pack years: 4.00     Types: Cigarettes     Quit date: 1980     Years since quittin.1   • Smokeless tobacco: Never Used   Vaping Use   • Vaping Use: Never used   Substance and Sexual Activity   • Alcohol use: Yes     Comment: 3 per week    • Drug use: No   • Sexual activity: Not on file   Other Topics Concern   • Not on file   Social History Narrative   • Not on file     Social Determinants of Health     Financial Resource Strain:    • Difficulty of Paying Living Expenses: Not on file   Food Insecurity:    • Worried About Running Out of Food in the Last Year: Not on file   • Ran Out of Food in the Last Year: Not on file   Transportation Needs:    • Lack of Transportation (Medical): Not on file   • Lack of Transportation (Non-Medical): Not on file   Physical Activity:    • Days of Exercise per Week: Not on file   • Minutes of Exercise per Session: Not on file   Stress:    • Feeling of Stress : Not on file   Social Connections:    • Frequency of Communication with Friends and Family: Not on file   • Frequency of Social Gatherings with Friends and Family: Not on file   • Attends Yazdanism Services: Not on file   • Active Member of Clubs or Organizations: Not on file   • Attends Club or Organization Meetings: Not on file   • Marital Status: Not on file   Intimate Partner Violence:    • Fear of Current or Ex-Partner: Not on file   • Emotionally Abused: Not  "on file   • Physically Abused: Not on file   • Sexually Abused: Not on file   Housing Stability:    • Unable to Pay for Housing in the Last Year: Not on file   • Number of Places Lived in the Last Year: Not on file   • Unstable Housing in the Last Year: Not on file        Family History:   Family History   Problem Relation Age of Onset   • Cancer Mother          PHYSICAL EXAM:   Vital signs: /74 (BP Location: Left arm, Patient Position: Sitting, BP Cuff Size: Adult)   Pulse 92   Ht 1.778 m (5' 10\")   Wt 87.1 kg (192 lb)   SpO2 99%   BMI 27.55 kg/m²   GENERAL: Well-developed, well-nourished  in no apparent distress.   EYE: No ocular and eyelid asymmetry, Anicteric sclerae,  PERRL  HENT: Hearing grossly intact, Normocephalic, atraumatic. Pink, moist mucous membranes, No exudate  NECK: Supple. Trachea midline. Well healed incision mid sternal notch line.  CARDIOVASCULAR: Regular rate and rhythm. No murmurs, rubs, or gallops.   LUNGS: Clear to auscultation bilaterally   ABDOMEN: Soft, nontender with positive bowel sounds.   EXTREMITIES: No clubbing, cyanosis, or edema.   NEUROLOGICAL: Cranial nerves II-XII are grossly intact   Symmetric reflexes at the patella no proximal muscle weakness  LYMPH: No cervical, supraclavicular,  adenopathy palpated.   SKIN: No rashes, lesions. Turgor is normal.    ASSESSMENT/PLAN:   1. Hypothyroidism, unspecified type  Stable.  Continue liothyronine 5 mcg daily and levothyroxine 50 mcg daily.  TPO Abs negative.   US of Thyroid confirmed bilateral thyroid lobes are present. Nodules were surgically removed 40 years ago but the gland is intact.       2.  Multiple thyroid nodules  Stable.  As per plan #1.  Ultrasound of thyroid with FNA biopsy completed, Everything is stable at this time.    3.  Vitamin D deficiency  Unstable.  Recommend decreasing vitamin D to 3000 IU daily.      Complete labs 1-2 weeks before next appointment.   Next appointment in 6 months.     Thank you kindly " for allowing me to participate in the thyroid care plan for this patient.    Elayne Phillip, APRN  02/01/2022    CC:   LONG Crane

## 2022-02-02 RX ORDER — APIXABAN 5 MG/1
TABLET, FILM COATED ORAL
Qty: 180 TABLET | Refills: 3 | Status: SHIPPED | OUTPATIENT
Start: 2022-02-02 | End: 2022-12-09

## 2022-03-10 PROBLEM — M17.12 OSTEOARTHRITIS OF LEFT KNEE: Status: ACTIVE | Noted: 2022-03-10

## 2022-03-16 ENCOUNTER — PATIENT MESSAGE (OUTPATIENT)
Dept: CARDIOLOGY | Facility: MEDICAL CENTER | Age: 77
End: 2022-03-16
Payer: MEDICARE

## 2022-06-28 NOTE — OR NURSING
A (Catheter Pa 110cm 7fr [place]-ravinder Std 4 Lum Td Strl) catheter was inserted. FAMILY AT BEDSIDE

## 2022-09-14 PROBLEM — M17.11 OSTEOARTHRITIS OF RIGHT KNEE: Status: ACTIVE | Noted: 2022-09-14

## 2022-09-26 ENCOUNTER — PATIENT MESSAGE (OUTPATIENT)
Dept: CARDIOLOGY | Facility: MEDICAL CENTER | Age: 77
End: 2022-09-26
Payer: MEDICARE

## 2022-10-04 ENCOUNTER — APPOINTMENT (OUTPATIENT)
Dept: LAB | Facility: MEDICAL CENTER | Age: 77
End: 2022-10-04
Payer: MEDICARE

## 2022-10-04 ENCOUNTER — HOSPITAL ENCOUNTER (OUTPATIENT)
Dept: LAB | Facility: MEDICAL CENTER | Age: 77
End: 2022-10-04
Attending: INTERNAL MEDICINE
Payer: MEDICARE

## 2022-10-04 ENCOUNTER — HOSPITAL ENCOUNTER (OUTPATIENT)
Dept: RADIOLOGY | Facility: MEDICAL CENTER | Age: 77
End: 2022-10-04
Attending: INTERNAL MEDICINE
Payer: MEDICARE

## 2022-10-04 DIAGNOSIS — M81.0 SENILE OSTEOPOROSIS: ICD-10-CM

## 2022-10-04 LAB
25(OH)D3 SERPL-MCNC: 91 NG/ML (ref 30–100)
T3FREE SERPL-MCNC: 2.57 PG/ML (ref 2–4.4)
T4 FREE SERPL-MCNC: 1.34 NG/DL (ref 0.93–1.7)
THYROPEROXIDASE AB SERPL-ACNC: <9 IU/ML (ref 0–9)
TSH SERPL DL<=0.005 MIU/L-ACNC: 0.26 UIU/ML (ref 0.38–5.33)
VIT B12 SERPL-MCNC: 777 PG/ML (ref 211–911)

## 2022-10-04 PROCEDURE — 36415 COLL VENOUS BLD VENIPUNCTURE: CPT

## 2022-10-04 PROCEDURE — 84481 FREE ASSAY (FT-3): CPT

## 2022-10-04 PROCEDURE — 82306 VITAMIN D 25 HYDROXY: CPT

## 2022-10-04 PROCEDURE — 77080 DXA BONE DENSITY AXIAL: CPT

## 2022-10-04 PROCEDURE — 84439 ASSAY OF FREE THYROXINE: CPT

## 2022-10-04 PROCEDURE — 86376 MICROSOMAL ANTIBODY EACH: CPT

## 2022-10-04 PROCEDURE — 82607 VITAMIN B-12: CPT

## 2022-10-04 PROCEDURE — 84443 ASSAY THYROID STIM HORMONE: CPT

## 2022-11-07 ENCOUNTER — PATIENT MESSAGE (OUTPATIENT)
Dept: HEALTH INFORMATION MANAGEMENT | Facility: OTHER | Age: 77
End: 2022-11-07

## 2022-11-18 ENCOUNTER — HOSPITAL ENCOUNTER (OUTPATIENT)
Dept: LAB | Facility: MEDICAL CENTER | Age: 77
End: 2022-11-18
Attending: INTERNAL MEDICINE
Payer: MEDICARE

## 2022-11-18 LAB
T3FREE SERPL-MCNC: 2.58 PG/ML (ref 2–4.4)
T4 FREE SERPL-MCNC: 1.09 NG/DL (ref 0.93–1.7)
TSH SERPL DL<=0.005 MIU/L-ACNC: 0.32 UIU/ML (ref 0.38–5.33)

## 2022-11-18 PROCEDURE — 36415 COLL VENOUS BLD VENIPUNCTURE: CPT

## 2022-11-18 PROCEDURE — 84439 ASSAY OF FREE THYROXINE: CPT

## 2022-11-18 PROCEDURE — 84443 ASSAY THYROID STIM HORMONE: CPT

## 2022-11-18 PROCEDURE — 84481 FREE ASSAY (FT-3): CPT

## 2022-12-02 ENCOUNTER — HOSPITAL ENCOUNTER (OUTPATIENT)
Dept: LAB | Facility: MEDICAL CENTER | Age: 77
End: 2022-12-02
Attending: INTERNAL MEDICINE
Payer: MEDICARE

## 2022-12-02 LAB
ANISOCYTOSIS BLD QL SMEAR: ABNORMAL
BASOPHILS # BLD AUTO: 0.9 % (ref 0–1.8)
BASOPHILS # BLD: 0.05 K/UL (ref 0–0.12)
BURR CELLS BLD QL SMEAR: NORMAL
EOSINOPHIL # BLD AUTO: 0.09 K/UL (ref 0–0.51)
EOSINOPHIL NFR BLD: 1.7 % (ref 0–6.9)
ERYTHROCYTE [DISTWIDTH] IN BLOOD BY AUTOMATED COUNT: 47.3 FL (ref 35.9–50)
HCT VFR BLD AUTO: 30 % (ref 37–47)
HGB BLD-MCNC: 8.8 G/DL (ref 12–16)
IRON SATN MFR SERPL: 7 % (ref 15–55)
IRON SERPL-MCNC: 22 UG/DL (ref 40–170)
LYMPHOCYTES # BLD AUTO: 0.42 K/UL (ref 1–4.8)
LYMPHOCYTES NFR BLD: 7.8 % (ref 22–41)
MANUAL DIFF BLD: NORMAL
MCH RBC QN AUTO: 21.3 PG (ref 27–33)
MCHC RBC AUTO-ENTMCNC: 29.3 G/DL (ref 33.6–35)
MCV RBC AUTO: 72.5 FL (ref 81.4–97.8)
MONOCYTES # BLD AUTO: 0.05 K/UL (ref 0–0.85)
MONOCYTES NFR BLD AUTO: 0.9 % (ref 0–13.4)
MORPHOLOGY BLD-IMP: NORMAL
NEUTROPHILS # BLD AUTO: 4.79 K/UL (ref 2–7.15)
NEUTROPHILS NFR BLD: 88.7 % (ref 44–72)
NRBC # BLD AUTO: 0 K/UL
NRBC BLD-RTO: 0 /100 WBC
OVALOCYTES BLD QL SMEAR: NORMAL
PLATELET # BLD AUTO: 328 K/UL (ref 164–446)
PLATELET BLD QL SMEAR: NORMAL
PMV BLD AUTO: 11.1 FL (ref 9–12.9)
POIKILOCYTOSIS BLD QL SMEAR: NORMAL
RBC # BLD AUTO: 4.14 M/UL (ref 4.2–5.4)
RBC BLD AUTO: PRESENT
SCHISTOCYTES BLD QL SMEAR: NORMAL
TIBC SERPL-MCNC: 304 UG/DL (ref 250–450)
UIBC SERPL-MCNC: 282 UG/DL (ref 110–370)
WBC # BLD AUTO: 5.4 K/UL (ref 4.8–10.8)

## 2022-12-02 PROCEDURE — 36415 COLL VENOUS BLD VENIPUNCTURE: CPT

## 2022-12-02 PROCEDURE — 83540 ASSAY OF IRON: CPT

## 2022-12-02 PROCEDURE — 83550 IRON BINDING TEST: CPT

## 2022-12-02 PROCEDURE — 85025 COMPLETE CBC W/AUTO DIFF WBC: CPT

## 2022-12-02 PROCEDURE — 85007 BL SMEAR W/DIFF WBC COUNT: CPT

## 2022-12-05 ENCOUNTER — PATIENT MESSAGE (OUTPATIENT)
Dept: CARDIOLOGY | Facility: MEDICAL CENTER | Age: 77
End: 2022-12-05
Payer: MEDICARE

## 2022-12-07 ENCOUNTER — PATIENT MESSAGE (OUTPATIENT)
Dept: CARDIOLOGY | Facility: MEDICAL CENTER | Age: 77
End: 2022-12-07
Payer: MEDICARE

## 2022-12-07 NOTE — PATIENT COMMUNICATION
Replied to pt via [employer] regarding recommendations, see encounter.    Task deferred to schedulers to schedule for [facility] via staff message.

## 2022-12-08 DIAGNOSIS — Z86.79 S/P ABLATION OF VENTRICULAR ARRHYTHMIA: ICD-10-CM

## 2022-12-08 DIAGNOSIS — Z98.890 S/P ABLATION OF VENTRICULAR ARRHYTHMIA: ICD-10-CM

## 2022-12-08 DIAGNOSIS — Z79.01 CHRONIC ANTICOAGULATION: ICD-10-CM

## 2022-12-09 NOTE — TELEPHONE ENCOUNTER
Is the patient due for a refill? Yes    Was the patient seen the past year? Yes    Date of last office visit: 1/18/22    Does the patient have an upcoming appointment?  Yes   If yes, When? 12/12/22    Provider to refill:CW    Does the patients insurance require a 100 day supply?  No

## 2022-12-12 ENCOUNTER — NON-PROVIDER VISIT (OUTPATIENT)
Dept: CARDIOLOGY | Facility: MEDICAL CENTER | Age: 77
End: 2022-12-12
Attending: NURSE PRACTITIONER
Payer: MEDICARE

## 2022-12-12 ENCOUNTER — OFFICE VISIT (OUTPATIENT)
Dept: CARDIOLOGY | Facility: MEDICAL CENTER | Age: 77
End: 2022-12-12
Payer: MEDICARE

## 2022-12-12 VITALS
HEART RATE: 73 BPM | OXYGEN SATURATION: 99 % | RESPIRATION RATE: 16 BRPM | DIASTOLIC BLOOD PRESSURE: 62 MMHG | HEIGHT: 70 IN | WEIGHT: 178 LBS | SYSTOLIC BLOOD PRESSURE: 112 MMHG | BODY MASS INDEX: 25.48 KG/M2

## 2022-12-12 DIAGNOSIS — R06.02 SHORTNESS OF BREATH: ICD-10-CM

## 2022-12-12 DIAGNOSIS — I20.89 ANGINA AT REST (HCC): ICD-10-CM

## 2022-12-12 DIAGNOSIS — Z79.01 CHRONIC ANTICOAGULATION: ICD-10-CM

## 2022-12-12 DIAGNOSIS — Z79.01 CHRONIC ANTICOAGULATION: Chronic | ICD-10-CM

## 2022-12-12 DIAGNOSIS — I49.3 VPC'S (VENTRICULAR PREMATURE COMPLEXES): ICD-10-CM

## 2022-12-12 DIAGNOSIS — E78.49 OTHER HYPERLIPIDEMIA: ICD-10-CM

## 2022-12-12 DIAGNOSIS — I44.2 AIVR (ACCELERATED IDIOVENTRICULAR RHYTHM) (HCC): Chronic | ICD-10-CM

## 2022-12-12 DIAGNOSIS — Z98.890 S/P ABLATION OF VENTRICULAR ARRHYTHMIA: ICD-10-CM

## 2022-12-12 DIAGNOSIS — Z86.79 S/P ABLATION OF VENTRICULAR ARRHYTHMIA: ICD-10-CM

## 2022-12-12 DIAGNOSIS — I49.1 APC (ATRIAL PREMATURE CONTRACTIONS): ICD-10-CM

## 2022-12-12 DIAGNOSIS — G45.9 TIA (TRANSIENT ISCHEMIC ATTACK): ICD-10-CM

## 2022-12-12 DIAGNOSIS — I49.8 VENTRICULAR BIGEMINY: ICD-10-CM

## 2022-12-12 DIAGNOSIS — I63.9 CEREBRAL INFARCTION, UNSPECIFIED MECHANISM (HCC): ICD-10-CM

## 2022-12-12 PROBLEM — K21.9 GASTROESOPHAGEAL REFLUX DISEASE: Status: ACTIVE | Noted: 2022-06-10

## 2022-12-12 PROBLEM — M85.80 OSTEOPENIA: Status: ACTIVE | Noted: 2022-06-10

## 2022-12-12 PROBLEM — K57.90 DIVERTICULAR DISEASE: Status: ACTIVE | Noted: 2022-12-12

## 2022-12-12 PROBLEM — E89.0 POSTOPERATIVE HYPOTHYROIDISM: Status: ACTIVE | Noted: 2022-06-10

## 2022-12-12 PROBLEM — Z86.39 HISTORY OF THYROID NODULE: Status: ACTIVE | Noted: 2022-06-10

## 2022-12-12 LAB — EKG IMPRESSION: NORMAL

## 2022-12-12 PROCEDURE — 99214 OFFICE O/P EST MOD 30 MIN: CPT | Performed by: NURSE PRACTITIONER

## 2022-12-12 PROCEDURE — 93000 ELECTROCARDIOGRAM COMPLETE: CPT | Performed by: INTERNAL MEDICINE

## 2022-12-12 RX ORDER — LEVOTHYROXINE SODIUM 75 UG/1
75 TABLET ORAL
COMMUNITY
Start: 2022-11-02

## 2022-12-12 RX ORDER — CEPHALEXIN 500 MG/1
CAPSULE ORAL
COMMUNITY
Start: 2022-10-04 | End: 2022-12-12

## 2022-12-12 ASSESSMENT — ENCOUNTER SYMPTOMS
ABDOMINAL PAIN: 0
SHORTNESS OF BREATH: 1
DIZZINESS: 0
COUGH: 0
FEVER: 0
MYALGIAS: 0
PALPITATIONS: 1
PND: 0
CLAUDICATION: 0
ORTHOPNEA: 0

## 2022-12-12 ASSESSMENT — FIBROSIS 4 INDEX: FIB4 SCORE: 1.17

## 2022-12-12 NOTE — PROGRESS NOTES
Subjective:   Felipa Stuart is a 71 y.o. female who presents today for surgery clearance and shortness of breath and palpitations.    She is a patient of Dr. Carvajal in our office.    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    She is pending hiatal hernia surgery again, but also a repeat EGD for dysphagia and she has some cardiac concerns with new onset shortness of breath with exertion and palpitations with exertion.This has been going on for about two months now.    She has had no episodes of chest pain, dizziness/lightheadedness, orthopnea, or peripheral edema.    Past Medical History:   Diagnosis Date    AIVR (accelerated idioventricular rhythm) (HCC) on Zio     Anesthesia     post op nausea    Arrhythmia     bigeminal PVC's, SVT with questionable Afib    Bradycardia     CATARACT     bianka IOL    Chicken pox     as child     Chronic anticoagulation - concern for afib     Heart burn     Hiatal hernia     Hiatus hernia syndrome     repaired 2/28/2016    HX: benign breast biopsy     Rt breast    Hypothyroidism     Measles     as child     PONV (postoperative nausea and vomiting)     Scarlet fever     as child     Stroke (HCC) 2/28/2016    mild speech delay    TIA (transient ischemic attack) 06/11/2013     Past Surgical History:   Procedure Laterality Date    PB TOTAL KNEE ARTHROPLASTY Right 10/18/2022    Procedure: RIGHT TOTAL KNEE ARTHROPLASTY;  Surgeon: Hemant Vaughan M.D.;  Location: [facility];  Service: Orthopedics    CA LAP,ESOPHAGOGAST FUNDOPLASTY  7/16/2019    Procedure: FUNDOPLICATION, NISSEN, LAPAROSCOPIC- REPAIR PARAESOPHAGEAL HIATAL HERNIA WITH MESH;  Surgeon: John H Ganser, M.D.;  Location: [facility];  Service: General    RECOVERY  7/13/2016    Procedure: CATH LAB-PVC ABLATION WAGNER-REGAN -CARTO;  Surgeon: [facility];  Location:  SURGERY PRE-[facility] PROC UN[facility] [place] [facility];  Service:     OTHER ABDOMINAL SURGERY  2016    hiatal hernia repair, Dr Darnell    FULL THICKNESS BLOCK RESECTION  3/18/2014    Performed by You Abbott M.D. at [facility]    OTHER  2013    bladder sling     OTHER ABDOMINAL SURGERY  2013    cholecystectomy    CATARACT EXTRACTION WITH IOL Bilateral 2008    LUMPECTOMY Right 1992    right breast    THYROID LOBECTOMY  1976    OTHER  1975    D&C and tubal ligation     ZZZ CARDIAC CATH      ablation     Family History   Problem Relation Age of Onset    Cancer Mother      Social History     Tobacco Use   Smoking Status Former    Packs/day: 0.20    Years: 20.00    Pack years: 4.00    Types: Cigarettes    Quit date: 1980    Years since quittin.9   Smokeless Tobacco Never     No Known Allergies  Outpatient Encounter Medications as of 2022   Medication Sig Dispense Refill    LEVOXYL 75 MCG Tab       apixaban (ELIQUIS) 5mg Tab Take 1 Tablet by mouth 2 times a day. 200 Tablet 3    DAILY MULTIPLE VITAMINS PO Take 1 Tablet by mouth every day.      polyethylene glycol/lytes (MIRALAX) 17 g Pack Take 1 Packet by mouth every day. 7 Each 3    omeprazole (PRILOSEC) 20 MG delayed-release capsule       atorvastatin (LIPITOR) 20 MG Tab Take 1 Tab by mouth every bedtime. 90 Tab 3    vitamin D (CHOLECALCIFEROL) 1000 UNIT Tab Take 4,000 Units by mouth every day.      [DISCONTINUED] cephALEXin (KEFLEX) 500 MG Cap TAKE 1 CAPSULE BY MOUTH FOUR TIMES DAILY UNTIL ALL TAKEN (Patient not taking: Reported on 2022)      [DISCONTINUED] apixaban (ELIQUIS) 5mg Tab Take 1 Tablet by mouth 2 times a day. **LAST SEEN 2022 .  TO ENSURE FURTHER REFILLS, KEEP SCHEDULED FOLLOW UP VISIT 2022.** 180 Tablet 0    [DISCONTINUED] omeprazole (PRILOSEC) 20 MG delayed-release capsule Take 20 mg by mouth every day. (Patient not taking: Reported on 2022)      [DISCONTINUED] apixaban (ELIQUIS) 5mg Tab Take 1 Tablet by mouth. (Patient  "not taking: Reported on 12/12/2022)      [DISCONTINUED] levothyroxine (SYNTHROID) 50 MCG Tab Take 50 mcg by mouth every day. (Patient not taking: Reported on 12/12/2022)      [DISCONTINUED] omeprazole (PRILOSEC) 20 MG delayed-release capsule 20 mg. (Patient not taking: Reported on 12/12/2022)      [DISCONTINUED] acetaminophen (TYLENOL) 500 MG Tab Take 1-2 Tablets by mouth every 6 hours as needed. 30 Tablet 0    [DISCONTINUED] TURMERIC PO Take  by mouth. (Patient not taking: Reported on 12/12/2022)      [DISCONTINUED] NON SPECIFIED  (Patient not taking: Reported on 12/12/2022)      [DISCONTINUED] DAILY MULTIPLE VITAMINS PO Take  by mouth. (Patient not taking: Reported on 12/12/2022)      [DISCONTINUED] omega-3 acid ethyl esters (LOVAZA) 1 GM capsule       [DISCONTINUED] levothyroxine (SYNTHROID) 50 MCG Tab Take 50 mcg by mouth Every morning on an empty stomach. (Patient not taking: Reported on 12/12/2022)      [DISCONTINUED] liothyronine (CYTOMEL) 5 MCG TABS Take 5 mcg by mouth every morning.   (Patient not taking: Reported on 12/12/2022)       No facility-administered encounter medications on file as of 12/12/2022.     Review of Systems   Constitutional:  Positive for malaise/fatigue. Negative for fever.        Exertional   Respiratory:  Positive for shortness of breath. Negative for cough.         Exertional   Cardiovascular:  Positive for palpitations. Negative for chest pain, orthopnea, claudication, leg swelling and PND.   Gastrointestinal:  Negative for abdominal pain.   Musculoskeletal:  Negative for myalgias.   Neurological:  Negative for dizziness.      Objective:   /62 (BP Location: Left arm, Patient Position: Sitting, BP Cuff Size: Adult)   Pulse 73   Resp 16   Ht 1.778 m (5' 10\")   Wt 80.7 kg (178 lb)   SpO2 99%   BMI 25.54 kg/m²     Physical Exam  Vitals and nursing note reviewed.   Constitutional:       General: She is not in acute distress.     Appearance: Normal appearance. She is " well-developed and normal weight.   HENT:      Head: Normocephalic and atraumatic.   Neck:      Vascular: No JVD.   Cardiovascular:      Rate and Rhythm: Normal rate and regular rhythm.      Pulses: Normal pulses.      Heart sounds: Normal heart sounds. No murmur heard.  Pulmonary:      Effort: Pulmonary effort is normal. No respiratory distress.      Breath sounds: Normal breath sounds.   Abdominal:      General: Bowel sounds are normal.      Palpations: Abdomen is soft.   Musculoskeletal:         General: Normal range of motion.      Cervical back: Normal range of motion.   Skin:     General: Skin is warm and dry.      Capillary Refill: Capillary refill takes less than 2 seconds.   Neurological:      General: No focal deficit present.      Mental Status: She is alert and oriented to person, place, and time. Mental status is at baseline.   Psychiatric:         Mood and Affect: Mood normal.         Behavior: Behavior normal.         Thought Content: Thought content normal.         Judgment: Judgment normal.     Lab Results   Component Value Date/Time    WBC 5.4 12/02/2022 09:40 AM    RBC 4.14 (L) 12/02/2022 09:40 AM    HEMOGLOBIN 8.8 (L) 12/02/2022 09:40 AM    HEMATOCRIT 30.0 (L) 12/02/2022 09:40 AM    MCV 72.5 (L) 12/02/2022 09:40 AM    MCH 21.3 (L) 12/02/2022 09:40 AM    MCHC 29.3 (L) 12/02/2022 09:40 AM    MPV 11.1 12/02/2022 09:40 AM      Lab Results   Component Value Date/Time    CHOLSTRLTOT 167 04/10/2021 08:42 AM    LDL 81 04/10/2021 08:42 AM    HDL 73 04/10/2021 08:42 AM    TRIGLYCERIDE 63 04/10/2021 08:42 AM       Lab Results   Component Value Date/Time    SODIUM 139 01/19/2022 09:59 AM    POTASSIUM 3.9 01/19/2022 09:59 AM    CHLORIDE 104 01/19/2022 09:59 AM    CO2 24 01/19/2022 09:59 AM    GLUCOSE 89 01/19/2022 09:59 AM    BUN 31 (H) 01/19/2022 09:59 AM    CREATININE 0.77 01/19/2022 09:59 AM    BUNCREATRAT 30 (H) 05/09/2019 07:40 AM     Lab Results   Component Value Date/Time    ALKPHOSPHAT 75 01/19/2022  09:59 AM    ASTSGOT 19 01/19/2022 09:59 AM    ALTSGPT 15 01/19/2022 09:59 AM    TBILIRUBIN 0.7 01/19/2022 09:59 AM      No results found for: BNPBTYPENAT     2/28/16 ECHO CONCLUSIONS  No obvious intracardiac thrombus noted.  Normal left ventricular systolic function.  Left ventricular ejection fraction is visually estimated to be 70%.  Grade I diastolic dysfunction.  Mild tricuspid regurgitation.  Estimated right ventricular systolic pressure is 20 mmHg.    Assessment:     1. Ventricular bigeminy  EKG    Holter Monitor Study      2. TIA (transient ischemic attack)        3. Cerebral infarction, unspecified mechanism ([facility])        4. VPC's (ventricular premature complexes)        5. S/P ablation of ventricular arrhythmia  apixaban (ELIQUIS) 5mg Tab      6. Chronic anticoagulation - concern for afib        7. AIVR (accelerated idioventricular rhythm) ([facility]) on Zio        8. Other hyperlipidemia  [id number] PET W/FLOW RESERVE      9. Chronic anticoagulation  apixaban (ELIQUIS) 5mg Tab      10. Shortness of breath  [id number] PET W/FLOW RESERVE      11. Angina at rest ([facility])  [id number] PET W/FLOW RESERVE          Medical Decision Making:  Today's Assessment / Status / Plan:     1. PVC's with ablation in '16  -follow with EKG today and [facility] live 3 days for PVC burden  -regular and rate controlled on exam  -consider bb therapy and re-consult with EP if wanted  -cont eliquis 5 mg BID although concern with low Hgb at 8.8, concern for anemia causing symptoms-recommend ER for worsening symptoms and progression of anemia    2. TIA/CVA  -no deficits  -cont eliquis and statin  -consider stopping eliquis and just do ASA 81 mg if no afib  -consider ILR placement?    3. Shortness of breath, exertional  -new onset, most likely multifactorial  -rule out ischemia with risk factors (check cardiac PET)  -anemia with Hgb at 8.8, recommend PCP and GI follow up on this, no bleeding or dark stools in stool right now  -follow  up with zio live 3 day    Patient is to follow up with Dr. Carvajal or Alis MILES in 6 months with zio monitor, annual labs, and stress test.

## 2022-12-13 NOTE — PROGRESS NOTES
Patient enrolled in the 3 day Zio AT [facility] heart monitoring program, per Alis Luna A.P.R.N.  >In clinic hook up, monitor S/N [id number].  >Pending EOS.

## 2022-12-14 ENCOUNTER — TELEPHONE (OUTPATIENT)
Dept: CARDIOLOGY | Facility: MEDICAL CENTER | Age: 77
End: 2022-12-14

## 2022-12-14 NOTE — TELEPHONE ENCOUNTER
Caller: Destiny from [employer]    Topic/issue: Their office called to let us know that her Zio AT monitor is functional but the cardiac information that its recording may not be relayed to us due to her being in a poor cell phone service area and it may be unable to transmit the information. They were calling to advise on if the patient should continue to wear it or to remove it and send it back to us  Ref# [id number]    Callback Number: [phone]      Thank you    -Jean Marie HAMLIN

## 2022-12-22 ENCOUNTER — TELEPHONE (OUTPATIENT)
Dept: CARDIOLOGY | Facility: MEDICAL CENTER | Age: 77
End: 2022-12-22
Payer: MEDICARE

## 2022-12-23 PROCEDURE — 93268 ECG RECORD/REVIEW: CPT | Performed by: INTERNAL MEDICINE

## 2022-12-24 ENCOUNTER — HOSPITAL ENCOUNTER (OUTPATIENT)
Facility: MEDICAL CENTER | Age: 77
End: 2022-12-25
Attending: EMERGENCY MEDICINE | Admitting: INTERNAL MEDICINE
Payer: MEDICARE

## 2022-12-24 ENCOUNTER — APPOINTMENT (OUTPATIENT)
Dept: RADIOLOGY | Facility: MEDICAL CENTER | Age: 77
End: 2022-12-24
Attending: EMERGENCY MEDICINE
Payer: MEDICARE

## 2022-12-24 DIAGNOSIS — M25.462 KNEE EFFUSION, LEFT: ICD-10-CM

## 2022-12-24 DIAGNOSIS — R26.2 INABILITY TO AMBULATE DUE TO LEFT KNEE: ICD-10-CM

## 2022-12-24 PROBLEM — M25.569 KNEE PAIN: Status: ACTIVE | Noted: 2022-12-24

## 2022-12-24 LAB
ALBUMIN SERPL BCP-MCNC: 3.7 G/DL (ref 3.2–4.9)
ALBUMIN/GLOB SERPL: 1.2 G/DL
ALP SERPL-CCNC: 121 U/L (ref 30–99)
ALT SERPL-CCNC: 8 U/L (ref 2–50)
ANION GAP SERPL CALC-SCNC: 13 MMOL/L (ref 7–16)
ANISOCYTOSIS BLD QL SMEAR: ABNORMAL
APTT PPP: 33.3 SEC (ref 24.7–36)
AST SERPL-CCNC: 18 U/L (ref 12–45)
BASOPHILS # BLD AUTO: 0.4 % (ref 0–1.8)
BASOPHILS # BLD: 0.04 K/UL (ref 0–0.12)
BILIRUB SERPL-MCNC: 0.7 MG/DL (ref 0.1–1.5)
BUN SERPL-MCNC: 26 MG/DL (ref 8–22)
BURR CELLS BLD QL SMEAR: NORMAL
CALCIUM ALBUM COR SERPL-MCNC: 9.1 MG/DL (ref 8.5–10.5)
CALCIUM SERPL-MCNC: 8.9 MG/DL (ref 8.5–10.5)
CHLORIDE SERPL-SCNC: 101 MMOL/L (ref 96–112)
CK SERPL-CCNC: 31 U/L (ref 0–154)
CO2 SERPL-SCNC: 21 MMOL/L (ref 20–33)
COMMENT 1642: NORMAL
CREAT SERPL-MCNC: 0.89 MG/DL (ref 0.5–1.4)
CRP SERPL HS-MCNC: 2.94 MG/DL (ref 0–0.75)
EOSINOPHIL # BLD AUTO: 0.03 K/UL (ref 0–0.51)
EOSINOPHIL NFR BLD: 0.3 % (ref 0–6.9)
ERYTHROCYTE [DISTWIDTH] IN BLOOD BY AUTOMATED COUNT: 63 FL (ref 35.9–50)
ERYTHROCYTE [SEDIMENTATION RATE] IN BLOOD BY WESTERGREN METHOD: 18 MM/HOUR (ref 0–25)
GFR SERPLBLD CREATININE-BSD FMLA CKD-EPI: 66 ML/MIN/1.73 M 2
GLOBULIN SER CALC-MCNC: 3 G/DL (ref 1.9–3.5)
GLUCOSE SERPL-MCNC: 131 MG/DL (ref 65–99)
HCT VFR BLD AUTO: 31.4 % (ref 37–47)
HGB BLD-MCNC: 9.4 G/DL (ref 12–16)
IMM GRANULOCYTES # BLD AUTO: 0.07 K/UL (ref 0–0.11)
IMM GRANULOCYTES NFR BLD AUTO: 0.7 % (ref 0–0.9)
INR PPP: 1.4 (ref 0.87–1.13)
LACTATE SERPL-SCNC: 2 MMOL/L (ref 0.5–2)
LYMPHOCYTES # BLD AUTO: 0.73 K/UL (ref 1–4.8)
LYMPHOCYTES NFR BLD: 6.8 % (ref 22–41)
MCH RBC QN AUTO: 22.1 PG (ref 27–33)
MCHC RBC AUTO-ENTMCNC: 29.9 G/DL (ref 33.6–35)
MCV RBC AUTO: 73.9 FL (ref 81.4–97.8)
MICROCYTES BLD QL SMEAR: ABNORMAL
MONOCYTES # BLD AUTO: 0.56 K/UL (ref 0–0.85)
MONOCYTES NFR BLD AUTO: 5.2 % (ref 0–13.4)
MORPHOLOGY BLD-IMP: NORMAL
NEUTROPHILS # BLD AUTO: 9.25 K/UL (ref 2–7.15)
NEUTROPHILS NFR BLD: 86.6 % (ref 44–72)
NRBC # BLD AUTO: 0 K/UL
NRBC BLD-RTO: 0 /100 WBC
OVALOCYTES BLD QL SMEAR: NORMAL
PLATELET # BLD AUTO: 329 K/UL (ref 164–446)
PLATELET BLD QL SMEAR: NORMAL
PMV BLD AUTO: 10.3 FL (ref 9–12.9)
POIKILOCYTOSIS BLD QL SMEAR: NORMAL
POTASSIUM SERPL-SCNC: 3.4 MMOL/L (ref 3.6–5.5)
PROT SERPL-MCNC: 6.7 G/DL (ref 6–8.2)
PROTHROMBIN TIME: 16.9 SEC (ref 12–14.6)
RBC # BLD AUTO: 4.25 M/UL (ref 4.2–5.4)
RBC BLD AUTO: PRESENT
SODIUM SERPL-SCNC: 135 MMOL/L (ref 135–145)
URATE SERPL-MCNC: 3.5 MG/DL (ref 1.9–8.2)
WBC # BLD AUTO: 10.7 K/UL (ref 4.8–10.8)

## 2022-12-24 PROCEDURE — G0378 HOSPITAL OBSERVATION PER HR: HCPCS

## 2022-12-24 PROCEDURE — 85025 COMPLETE CBC W/AUTO DIFF WBC: CPT | Mod: 91

## 2022-12-24 PROCEDURE — 99220 PR INITIAL OBSERVATION CARE,LEVL III: CPT | Performed by: INTERNAL MEDICINE

## 2022-12-24 PROCEDURE — 82550 ASSAY OF CK (CPK): CPT

## 2022-12-24 PROCEDURE — A9270 NON-COVERED ITEM OR SERVICE: HCPCS | Performed by: INTERNAL MEDICINE

## 2022-12-24 PROCEDURE — 80053 COMPREHEN METABOLIC PANEL: CPT | Mod: 91

## 2022-12-24 PROCEDURE — 85610 PROTHROMBIN TIME: CPT

## 2022-12-24 PROCEDURE — 700111 HCHG RX REV CODE 636 W/ 250 OVERRIDE (IP): Performed by: EMERGENCY MEDICINE

## 2022-12-24 PROCEDURE — 84550 ASSAY OF BLOOD/URIC ACID: CPT

## 2022-12-24 PROCEDURE — 700102 HCHG RX REV CODE 250 W/ 637 OVERRIDE(OP): Performed by: INTERNAL MEDICINE

## 2022-12-24 PROCEDURE — 73562 X-RAY EXAM OF KNEE 3: CPT | Mod: LT

## 2022-12-24 PROCEDURE — 86140 C-REACTIVE PROTEIN: CPT

## 2022-12-24 PROCEDURE — 83605 ASSAY OF LACTIC ACID: CPT

## 2022-12-24 PROCEDURE — 96375 TX/PRO/DX INJ NEW DRUG ADDON: CPT

## 2022-12-24 PROCEDURE — 87040 BLOOD CULTURE FOR BACTERIA: CPT | Mod: 91

## 2022-12-24 PROCEDURE — 85730 THROMBOPLASTIN TIME PARTIAL: CPT

## 2022-12-24 PROCEDURE — 36415 COLL VENOUS BLD VENIPUNCTURE: CPT

## 2022-12-24 PROCEDURE — 96374 THER/PROPH/DIAG INJ IV PUSH: CPT

## 2022-12-24 PROCEDURE — 85652 RBC SED RATE AUTOMATED: CPT

## 2022-12-24 PROCEDURE — 99285 EMERGENCY DEPT VISIT HI MDM: CPT

## 2022-12-24 RX ORDER — ONDANSETRON 4 MG/1
4 TABLET, ORALLY DISINTEGRATING ORAL EVERY 4 HOURS PRN
Status: DISCONTINUED | OUTPATIENT
Start: 2022-12-24 | End: 2022-12-25 | Stop reason: HOSPADM

## 2022-12-24 RX ORDER — BISACODYL 10 MG
10 SUPPOSITORY, RECTAL RECTAL
Status: DISCONTINUED | OUTPATIENT
Start: 2022-12-24 | End: 2022-12-25 | Stop reason: HOSPADM

## 2022-12-24 RX ORDER — FERROUS SULFATE 325(65) MG
325 TABLET ORAL DAILY
Status: SHIPPED | COMMUNITY
End: 2023-07-06

## 2022-12-24 RX ORDER — POLYETHYLENE GLYCOL 3350 17 G/17G
1 POWDER, FOR SOLUTION ORAL
Status: DISCONTINUED | OUTPATIENT
Start: 2022-12-24 | End: 2022-12-24

## 2022-12-24 RX ORDER — LEVOTHYROXINE SODIUM 0.07 MG/1
75 TABLET ORAL
Status: DISCONTINUED | OUTPATIENT
Start: 2022-12-25 | End: 2022-12-25 | Stop reason: HOSPADM

## 2022-12-24 RX ORDER — OXYCODONE HYDROCHLORIDE 5 MG/1
5 TABLET ORAL
Status: DISCONTINUED | OUTPATIENT
Start: 2022-12-24 | End: 2022-12-25 | Stop reason: HOSPADM

## 2022-12-24 RX ORDER — CYCLOBENZAPRINE HCL 5 MG
10 TABLET ORAL 3 TIMES DAILY
Status: DISCONTINUED | OUTPATIENT
Start: 2022-12-24 | End: 2022-12-25 | Stop reason: HOSPADM

## 2022-12-24 RX ORDER — POLYETHYLENE GLYCOL 3350 17 G/17G
1 POWDER, FOR SOLUTION ORAL DAILY
Status: DISCONTINUED | OUTPATIENT
Start: 2022-12-25 | End: 2022-12-25 | Stop reason: HOSPADM

## 2022-12-24 RX ORDER — MORPHINE SULFATE 4 MG/ML
2 INJECTION INTRAVENOUS
Status: DISCONTINUED | OUTPATIENT
Start: 2022-12-24 | End: 2022-12-25 | Stop reason: HOSPADM

## 2022-12-24 RX ORDER — MORPHINE SULFATE 4 MG/ML
4 INJECTION INTRAVENOUS ONCE
Status: COMPLETED | OUTPATIENT
Start: 2022-12-24 | End: 2022-12-24

## 2022-12-24 RX ORDER — POTASSIUM CHLORIDE 20 MEQ/1
40 TABLET, EXTENDED RELEASE ORAL ONCE
Status: COMPLETED | OUTPATIENT
Start: 2022-12-24 | End: 2022-12-24

## 2022-12-24 RX ORDER — ATORVASTATIN CALCIUM 20 MG/1
20 TABLET, FILM COATED ORAL
Status: DISCONTINUED | OUTPATIENT
Start: 2022-12-24 | End: 2022-12-25 | Stop reason: HOSPADM

## 2022-12-24 RX ORDER — OMEPRAZOLE 20 MG/1
20 CAPSULE, DELAYED RELEASE ORAL DAILY
Status: DISCONTINUED | OUTPATIENT
Start: 2022-12-25 | End: 2022-12-25 | Stop reason: HOSPADM

## 2022-12-24 RX ORDER — SENNOSIDES 8.6 MG
1300 CAPSULE ORAL EVERY 8 HOURS PRN
Status: ON HOLD | COMMUNITY
End: 2023-04-07

## 2022-12-24 RX ORDER — ONDANSETRON 2 MG/ML
4 INJECTION INTRAMUSCULAR; INTRAVENOUS ONCE
Status: COMPLETED | OUTPATIENT
Start: 2022-12-24 | End: 2022-12-24

## 2022-12-24 RX ORDER — FAMOTIDINE 20 MG/1
20-70 TABLET, FILM COATED ORAL DAILY
Status: SHIPPED | COMMUNITY
End: 2023-07-06

## 2022-12-24 RX ORDER — AMOXICILLIN 250 MG
2 CAPSULE ORAL 2 TIMES DAILY
Status: DISCONTINUED | OUTPATIENT
Start: 2022-12-25 | End: 2022-12-25 | Stop reason: HOSPADM

## 2022-12-24 RX ORDER — ONDANSETRON 2 MG/ML
4 INJECTION INTRAMUSCULAR; INTRAVENOUS EVERY 4 HOURS PRN
Status: DISCONTINUED | OUTPATIENT
Start: 2022-12-24 | End: 2022-12-25 | Stop reason: HOSPADM

## 2022-12-24 RX ORDER — ACETAMINOPHEN 500 MG
1000 TABLET ORAL EVERY 8 HOURS
Status: DISCONTINUED | OUTPATIENT
Start: 2022-12-24 | End: 2022-12-25 | Stop reason: HOSPADM

## 2022-12-24 RX ORDER — OXYCODONE HYDROCHLORIDE 5 MG/1
2.5 TABLET ORAL
Status: DISCONTINUED | OUTPATIENT
Start: 2022-12-24 | End: 2022-12-25 | Stop reason: HOSPADM

## 2022-12-24 RX ORDER — LIOTHYRONINE SODIUM 5 UG/1
5 TABLET ORAL DAILY
COMMUNITY

## 2022-12-24 RX ADMIN — CYCLOBENZAPRINE HYDROCHLORIDE 10 MG: 5 TABLET, FILM COATED ORAL at 20:36

## 2022-12-24 RX ADMIN — ONDANSETRON 4 MG: 2 INJECTION INTRAMUSCULAR; INTRAVENOUS at 18:17

## 2022-12-24 RX ADMIN — ATORVASTATIN CALCIUM 20 MG: 20 TABLET, FILM COATED ORAL at 21:44

## 2022-12-24 RX ADMIN — ACETAMINOPHEN 1000 MG: 500 TABLET ORAL at 21:44

## 2022-12-24 RX ADMIN — POTASSIUM CHLORIDE 40 MEQ: 1500 TABLET, EXTENDED RELEASE ORAL at 20:36

## 2022-12-24 RX ADMIN — MORPHINE SULFATE 4 MG: 4 INJECTION INTRAVENOUS at 18:18

## 2022-12-24 ASSESSMENT — LIFESTYLE VARIABLES
ON A TYPICAL DAY WHEN YOU DRINK ALCOHOL HOW MANY DRINKS DO YOU HAVE: 0
HOW MANY TIMES IN THE PAST YEAR HAVE YOU HAD 5 OR MORE DRINKS IN A DAY: 0
EVER FELT BAD OR GUILTY ABOUT YOUR DRINKING: NO
ALCOHOL_USE: NO
HAVE PEOPLE ANNOYED YOU BY CRITICIZING YOUR DRINKING: NO
TOTAL SCORE: 0
CONSUMPTION TOTAL: NEGATIVE
AVERAGE NUMBER OF DAYS PER WEEK YOU HAVE A DRINK CONTAINING ALCOHOL: 0
SUBSTANCE_ABUSE: 0
EVER HAD A DRINK FIRST THING IN THE MORNING TO STEADY YOUR NERVES TO GET RID OF A HANGOVER: NO
TOTAL SCORE: 0
TOTAL SCORE: 0
HAVE YOU EVER FELT YOU SHOULD CUT DOWN ON YOUR DRINKING: NO

## 2022-12-24 ASSESSMENT — ENCOUNTER SYMPTOMS
BACK PAIN: 0
POLYDIPSIA: 0
SPEECH CHANGE: 0
NAUSEA: 0
BLURRED VISION: 0
FLANK PAIN: 0
FOCAL WEAKNESS: 0
SPUTUM PRODUCTION: 0
NECK PAIN: 0
NERVOUS/ANXIOUS: 0
PALPITATIONS: 0
PHOTOPHOBIA: 0
HEARTBURN: 0
TREMORS: 0
DOUBLE VISION: 0
COUGH: 0
HEMOPTYSIS: 0
CHILLS: 0
BRUISES/BLEEDS EASILY: 0
VOMITING: 0
WEIGHT LOSS: 0
HEADACHES: 0
FEVER: 0
ORTHOPNEA: 0
HALLUCINATIONS: 0

## 2022-12-24 ASSESSMENT — PAIN DESCRIPTION - PAIN TYPE: TYPE: ACUTE PAIN

## 2022-12-24 ASSESSMENT — PATIENT HEALTH QUESTIONNAIRE - PHQ9
2. FEELING DOWN, DEPRESSED, IRRITABLE, OR HOPELESS: NOT AT ALL
SUM OF ALL RESPONSES TO PHQ9 QUESTIONS 1 AND 2: 0
1. LITTLE INTEREST OR PLEASURE IN DOING THINGS: NOT AT ALL

## 2022-12-24 ASSESSMENT — FIBROSIS 4 INDEX
FIB4 SCORE: 1.17
FIB4 SCORE: 1.51

## 2022-12-25 VITALS
BODY MASS INDEX: 25.67 KG/M2 | HEART RATE: 61 BPM | HEIGHT: 69 IN | DIASTOLIC BLOOD PRESSURE: 57 MMHG | WEIGHT: 173.28 LBS | TEMPERATURE: 97 F | OXYGEN SATURATION: 92 % | SYSTOLIC BLOOD PRESSURE: 98 MMHG | RESPIRATION RATE: 16 BRPM

## 2022-12-25 PROBLEM — M25.569 KNEE PAIN: Status: RESOLVED | Noted: 2022-12-24 | Resolved: 2022-12-25

## 2022-12-25 LAB
ALBUMIN SERPL BCP-MCNC: 3.6 G/DL (ref 3.2–4.9)
ALBUMIN/GLOB SERPL: 1.4 G/DL
ALP SERPL-CCNC: 111 U/L (ref 30–99)
ALT SERPL-CCNC: 8 U/L (ref 2–50)
ANION GAP SERPL CALC-SCNC: 11 MMOL/L (ref 7–16)
AST SERPL-CCNC: 15 U/L (ref 12–45)
BASOPHILS # BLD AUTO: 0.6 % (ref 0–1.8)
BASOPHILS # BLD: 0.05 K/UL (ref 0–0.12)
BILIRUB SERPL-MCNC: 0.6 MG/DL (ref 0.1–1.5)
BUN SERPL-MCNC: 23 MG/DL (ref 8–22)
CALCIUM ALBUM COR SERPL-MCNC: 9.4 MG/DL (ref 8.5–10.5)
CALCIUM SERPL-MCNC: 9.1 MG/DL (ref 8.5–10.5)
CHLORIDE SERPL-SCNC: 104 MMOL/L (ref 96–112)
CO2 SERPL-SCNC: 22 MMOL/L (ref 20–33)
COMMENT 1642: NORMAL
CREAT SERPL-MCNC: 0.74 MG/DL (ref 0.5–1.4)
EOSINOPHIL # BLD AUTO: 0.03 K/UL (ref 0–0.51)
EOSINOPHIL NFR BLD: 0.4 % (ref 0–6.9)
ERYTHROCYTE [DISTWIDTH] IN BLOOD BY AUTOMATED COUNT: 64.8 FL (ref 35.9–50)
GFR SERPLBLD CREATININE-BSD FMLA CKD-EPI: 82 ML/MIN/1.73 M 2
GLOBULIN SER CALC-MCNC: 2.5 G/DL (ref 1.9–3.5)
GLUCOSE SERPL-MCNC: 106 MG/DL (ref 65–99)
HCT VFR BLD AUTO: 30.5 % (ref 37–47)
HGB BLD-MCNC: 9.1 G/DL (ref 12–16)
IMM GRANULOCYTES # BLD AUTO: 0.04 K/UL (ref 0–0.11)
IMM GRANULOCYTES NFR BLD AUTO: 0.5 % (ref 0–0.9)
LYMPHOCYTES # BLD AUTO: 0.96 K/UL (ref 1–4.8)
LYMPHOCYTES NFR BLD: 12.2 % (ref 22–41)
MCH RBC QN AUTO: 22.5 PG (ref 27–33)
MCHC RBC AUTO-ENTMCNC: 29.8 G/DL (ref 33.6–35)
MCV RBC AUTO: 75.5 FL (ref 81.4–97.8)
MONOCYTES # BLD AUTO: 0.55 K/UL (ref 0–0.85)
MONOCYTES NFR BLD AUTO: 7 % (ref 0–13.4)
MORPHOLOGY BLD-IMP: NORMAL
NEUTROPHILS # BLD AUTO: 6.23 K/UL (ref 2–7.15)
NEUTROPHILS NFR BLD: 79.3 % (ref 44–72)
NRBC # BLD AUTO: 0 K/UL
NRBC BLD-RTO: 0 /100 WBC
PLATELET # BLD AUTO: 304 K/UL (ref 164–446)
PMV BLD AUTO: 10.8 FL (ref 9–12.9)
POTASSIUM SERPL-SCNC: 4 MMOL/L (ref 3.6–5.5)
PROT SERPL-MCNC: 6.1 G/DL (ref 6–8.2)
RBC # BLD AUTO: 4.04 M/UL (ref 4.2–5.4)
SODIUM SERPL-SCNC: 137 MMOL/L (ref 135–145)
WBC # BLD AUTO: 7.9 K/UL (ref 4.8–10.8)

## 2022-12-25 PROCEDURE — 700102 HCHG RX REV CODE 250 W/ 637 OVERRIDE(OP): Performed by: INTERNAL MEDICINE

## 2022-12-25 PROCEDURE — 99217 PR OBSERVATION CARE DISCHARGE: CPT | Performed by: INTERNAL MEDICINE

## 2022-12-25 PROCEDURE — 89051 BODY FLUID CELL COUNT: CPT

## 2022-12-25 PROCEDURE — A9270 NON-COVERED ITEM OR SERVICE: HCPCS | Performed by: INTERNAL MEDICINE

## 2022-12-25 PROCEDURE — G0378 HOSPITAL OBSERVATION PER HR: HCPCS

## 2022-12-25 PROCEDURE — 20610 DRAIN/INJ JOINT/BURSA W/O US: CPT | Mod: 79,LT | Performed by: PHYSICIAN ASSISTANT

## 2022-12-25 RX ORDER — OXYCODONE HYDROCHLORIDE 5 MG/1
5 TABLET ORAL EVERY 6 HOURS PRN
Qty: 20 TABLET | Refills: 0 | Status: SHIPPED | OUTPATIENT
Start: 2022-12-25 | End: 2022-12-28 | Stop reason: SDUPTHER

## 2022-12-25 RX ADMIN — POLYETHYLENE GLYCOL 3350 1 PACKET: 17 POWDER, FOR SOLUTION ORAL at 05:25

## 2022-12-25 RX ADMIN — OMEPRAZOLE 20 MG: 20 CAPSULE, DELAYED RELEASE ORAL at 05:25

## 2022-12-25 RX ADMIN — CYCLOBENZAPRINE HYDROCHLORIDE 10 MG: 5 TABLET, FILM COATED ORAL at 05:25

## 2022-12-25 RX ADMIN — LEVOTHYROXINE SODIUM 75 MCG: 0.07 TABLET ORAL at 05:25

## 2022-12-25 RX ADMIN — ACETAMINOPHEN 1000 MG: 500 TABLET ORAL at 05:25

## 2022-12-25 RX ADMIN — SENNOSIDES AND DOCUSATE SODIUM 2 TABLET: 50; 8.6 TABLET ORAL at 05:25

## 2022-12-25 ASSESSMENT — PAIN DESCRIPTION - PAIN TYPE: TYPE: ACUTE PAIN

## 2022-12-25 NOTE — PROGRESS NOTES
Ortho team at bedside. Attempted aspiration of knee. Small amount of sang fluid aspirated (1-2cc). Will send to lab for culture as ordered.

## 2022-12-25 NOTE — ED NOTES
Phone report to CARLOS Ko. All questions answered. Pt transported off unit by ED tech. Pt awake and breathing with even, unlabored respirations on RA at time of transfer.

## 2022-12-25 NOTE — CARE PLAN
The patient is Stable - Low risk of patient condition declining or worsening    Shift Goals  Clinical Goals: Pain control, ortho eval  Patient Goals: comfort, DC    Progress made toward(s) clinical / shift goals:  Pending ortho eval    Patient is not progressing towards the following goals:

## 2022-12-25 NOTE — ED PROVIDER NOTES
ED Provider Note    ED Provider Note    Scribed for Antonio Walters MD by Antonio Walters M.D.. 12/24/2022, 6:11 PM.    Primary care provider: CHALO Reed  Means of arrival: EMS  History obtained from: Pt and family  History limited by: None    CHIEF COMPLAINT  Chief Complaint   Patient presents with    Knee Pain     Pt has noticed increase in swelling and pain in L knee over the past 3 days. Over the past hour, pain has been so severe pt has been unable to to bear weight. Denies any trauma.        HPI  Felipa Stuart is a 77 y.o. female who presents to the Emergency Department for evaluation of pain and swelling to the left knee.  Patient notes that started about 3 days ago, no particular injury that she can recall.  She believes swelling has been improved transiently with elevation and applying heat, she has also had some mild improvement with Tylenol.  Patient relates however despite this her symptoms have been getting worse, today she notes pain so severe she has been unable to even bear weight.  She localizes the pain over the anterior aspect of the knee also the distal thigh and the proximal lower leg.  No other joints involved, no right-sided symptoms.  She has no history of gout or pseudogout as far she is aware.  Patient notes that is an artificial knee, she underwent a total knee replacement in April by Dr. Vaughan.  She has had no problems since.  No fever, no vomiting.  Given severity of pain and now unable to bear weight she came to be assessed.      REVIEW OF SYSTEMS  Pertinent positives include atraumatic pain and swelling to the left knee, history of knee replacement, bruise noted below the knee, she is anticoagulated on Eliquis. Pertinent negatives include no fever, no bleeding, no fever, no redness of the knee, no injury she can recall, no other joints involved.  All other systems reviewed and negative.    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    SURGICAL HISTORY   has a past surgical history that includes thyroid lobectomy (); cataract extraction with iol (Bilateral, ); other (); full thickness block resection (3/18/2014); other abdominal surgery (); other abdominal surgery (2016); recovery (2016[facility]; other (); lumpectomy (Right, ); lap,esophagogast fundoplasty (2019); and total knee arthroplasty (Right, 10/18/2022).    SOCIAL HISTORY  Social History     Tobacco Use    Smoking status: Former     Packs/day: 0.20     Years: 20.00     Pack years: 4.00     Types: Cigarettes     Quit date: 1980     Years since quittin.0    Smokeless tobacco: Never   Vaping Use    Vaping Use: Never used   Substance Use Topics    Alcohol use: Yes     Comment: 3 per week     Drug use: No      Social History     Substance and Sexual Activity   Drug Use No       FAMILY HISTORY  Family History   Problem Relation Age of Onset    Cancer Mother        CURRENT MEDICATIONS  Home Medications       Reviewed by Jaz Simmons R.N. (Registered Nurse) on 22 at 1746  Med List Status: Partial     Medication Last Dose Status   apixaban (ELIQUIS) 5mg Tab  Active   atorvastatin (LIPITOR) 20 MG Tab  Active   DAILY MULTIPLE VITAMINS PO  Active   LEVOXYL 75 MCG Tab  Active   omeprazole (PRILOSEC) 20 MG delayed-release capsule  Active   polyethylene glycol/lytes (MIRALAX) 17 g Pack  Active   vitamin D (CHOLECALCIFEROL) 1000 UNIT Tab  Active                    ALLERGIES  No Known Allergies    PHYSICAL EXAM  VITAL SIGNS: /70   Pulse 70   Temp 36.7 °C  "(98.1 °F) (Temporal)   Resp 15   Ht 1.778 m (5' 10\")   Wt 77.1 kg (170 lb)   SpO2 95%   BMI 24.39 kg/m²     General: Alert, no acute distress, appears uncomfortable  Skin: Warm, dry, normal for ethnicity  Head: Normocephalic, atraumatic  Neck: Trachea midline, no tenderness  Eye: PERRL, normal conjunctiva  Cardiovascular: Regular rate and rhythm, No murmur, Normal peripheral perfusion  Respiratory: Lungs CTA, respirations are non-labored, breath sounds are equal  Gastrointestinal: Soft, nontender, non distended  Musculoskeletal: Large swelling consistent with knee effusion noted to the left knee, no palpable warmth, no erythema.  Tenderness to palpation throughout the anterior aspect of the knee, there is no step-off, no laxity with valgus or varus stress, range of motion with regard to both flexion and extension is significantly restricted secondary to pain.  Distal function is intact.  Neurological: Alert and oriented to person, place, time, and situation.  Dorsal and plantar flexion of foot and toes is fully intact, sensation light touch and pain is fully intact on exam the left knee.  Lymphatics: No left lower extreme lymphangitis  Psychiatric: Cooperative, appropriate mood & affect      DIAGNOSTIC STUDIES/PROCEDURES    LABS  Results for orders placed or performed during the hospital encounter of 12/24/22   CBC With Differential   Result Value Ref Range    WBC 10.7 4.8 - 10.8 K/uL    RBC 4.25 4.20 - 5.40 M/uL    Hemoglobin 9.4 (L) 12.0 - 16.0 g/dL    Hematocrit 31.4 (L) 37.0 - 47.0 %    MCV 73.9 (L) 81.4 - 97.8 fL    MCH 22.1 (L) 27.0 - 33.0 pg    MCHC 29.9 (L) 33.6 - 35.0 g/dL    RDW 63.0 (H) 35.9 - 50.0 fL    Platelet Count 329 164 - 446 K/uL    MPV 10.3 9.0 - 12.9 fL    Neutrophils-Polys 86.60 (H) 44.00 - 72.00 %    Lymphocytes 6.80 (L) 22.00 - 41.00 %    Monocytes 5.20 0.00 - 13.40 %    Eosinophils 0.30 0.00 - 6.90 %    Basophils 0.40 0.00 - 1.80 %    Immature Granulocytes 0.70 0.00 - 0.90 %    " Nucleated RBC 0.00 /100 WBC    Neutrophils (Absolute) 9.25 (H) 2.00 - 7.15 K/uL    Lymphs (Absolute) 0.73 (L) 1.00 - 4.80 K/uL    Monos (Absolute) 0.56 0.00 - 0.85 K/uL    Eos (Absolute) 0.03 0.00 - 0.51 K/uL    Baso (Absolute) 0.04 0.00 - 0.12 K/uL    Immature Granulocytes (abs) 0.07 0.00 - 0.11 K/uL    NRBC (Absolute) 0.00 K/uL    Anisocytosis 1+     Microcytosis 1+    Comp Metabolic Panel   Result Value Ref Range    Sodium 135 135 - 145 mmol/L    Potassium 3.4 (L) 3.6 - 5.5 mmol/L    Chloride 101 96 - 112 mmol/L    Co2 21 20 - 33 mmol/L    Anion Gap 13.0 7.0 - 16.0    Glucose 131 (H) 65 - 99 mg/dL    Bun 26 (H) 8 - 22 mg/dL    Creatinine 0.89 0.50 - 1.40 mg/dL    Calcium 8.9 8.5 - 10.5 mg/dL    AST(SGOT) 18 12 - 45 U/L    ALT(SGPT) 8 2 - 50 U/L    Alkaline Phosphatase 121 (H) 30 - 99 U/L    Total Bilirubin 0.7 0.1 - 1.5 mg/dL    Albumin 3.7 3.2 - 4.9 g/dL    Total Protein 6.7 6.0 - 8.2 g/dL    Globulin 3.0 1.9 - 3.5 g/dL    A-G Ratio 1.2 g/dL   Lactic Acid   Result Value Ref Range    Lactic Acid 2.0 0.5 - 2.0 mmol/L   C Reactive Protein Quantitative (Non-Cardiac)   Result Value Ref Range    Stat C-Reactive Protein 2.94 (H) 0.00 - 0.75 mg/dL   APTT   Result Value Ref Range    APTT 33.3 24.7 - 36.0 sec   Prothrombin Time   Result Value Ref Range    PT 16.9 (H) 12.0 - 14.6 sec    INR 1.40 (H) 0.87 - 1.13   CREATINE KINASE   Result Value Ref Range    CPK Total 31 0 - 154 U/L   ESTIMATED GFR   Result Value Ref Range    GFR (CKD-EPI) 66 >60 mL/min/1.73 m 2   PERIPHERAL SMEAR REVIEW   Result Value Ref Range    Peripheral Smear Review see below    PLATELET ESTIMATE   Result Value Ref Range    Plt Estimation Normal    MORPHOLOGY   Result Value Ref Range    RBC Morphology Present     Poikilocytosis 2+     Ovalocytes 1+     Echinocytes 2+    DIFFERENTIAL COMMENT   Result Value Ref Range    Comments-Diff see below    CORRECTED CALCIUM   Result Value Ref Range    Correct Calcium 9.1 8.5 - 10.5 mg/dL      All labs reviewed by  "me, .      RADIOLOGY  DX-KNEE 3 VIEWS LEFT   Final Result      1.  Postoperative left total knee arthroplasty. No evidence of hardware loosening or fracture.   2.  Diffuse induration of the soft tissues about the knee. Nonspecific.   3.  Large left knee joint effusion.        The radiologist's interpretation of all radiological studies have been reviewed by me.    COURSE & MEDICAL DECISION MAKING  Pertinent Labs & Imaging studies reviewed. (See chart for details)    6:11 PM - Patient seen and examined at bedside. Patient will be treated with morphine/Zofran. Ordered septic/metabolic work-up as well as inflammatory markers and uric acid, knee x-ray to evaluate her symptoms. The differential diagnoses include but are not limited to: Knee effusion, septic arthritis, hemarthrosis, gout, pseudogout    1907: Labs reviewed, I have paged the on-call orthopod for Dr. Vaughan to discuss arthrocentesis.    1915: Patient reassessed and updated with work-up results.    1950: I spoke with Dr. Hernandez and who is on for Dr. Vaughan.  He notes given no fever and reassuring labs presentation very unlikely to represent septic arthritis.  I related my plan to have the patient admitted for pain control, he would like to hold arthrocentesis at this time but notes the orthopedic service will gladly consult on the case and will see her while she is in hospital.  Have paged hospitalist for admission.    2008: I have heard back from the hospitalist Dr. Dominguez; he concurs with plan and accepts the patient to his service.      Patient Vitals for the past 24 hrs:   BP Temp Temp src Pulse Resp SpO2 Height Weight   12/24/22 1832 124/70 -- -- 70 -- 95 % -- --   12/24/22 1743 (!) 169/72 36.7 °C (98.1 °F) Temporal 90 15 96 % -- --   12/24/22 1741 -- -- -- -- -- -- 1.778 m (5' 10\") 77.1 kg (170 lb)        Decision Making:  This is a 77 y.o. year old female who presents with pain and swelling without trauma to the left knee.  She is anticoagulated on " Eliquis, no fever, the joint does not feel warm nor is or any redness.  Work-up will be obtained to evaluate the etiology of her effusion.  Studies demonstrate evidence of a large effusion but reassuringly no leukocytosis and her CRP is only mildly elevated.  X-ray confirms effusion.  Spoke with orthopedist who recommended against arthrocentesis.  Given the patient is anticoagulated effusion could be hemarthrosis, patient will be admitted for pain control, orthopedics to consult.    Patient admitted to medical surgical floor in improved but guarded condition.    FINAL IMPRESSION  1. Knee effusion, left    2. Inability to ambulate due to left knee          Antonio CHENG M.D. (Yohana), am scribing for, and in the presence of, Antonio Walters MD.    Electronically signed by: Antonio Walters M.D. (Yohana), 12/24/2022    Antonio CHENG MD personally performed the services described in this documentation, as scribed by Antonio Walters M.D. in my presence, and it is both accurate and complete    The note accurately reflects work and decisions made by me.  Antonio Walters M.D.  12/24/2022  8:08 PM

## 2022-12-25 NOTE — DISCHARGE SUMMARY
Discharge Summary    CHIEF COMPLAINT ON ADMISSION  Chief Complaint   Patient presents with    Knee Pain     Pt has noticed increase in swelling and pain in L knee over the past 3 days. Over the past hour, pain has been so severe pt has been unable to to bear weight. Denies any trauma.        Reason for Admission  Knee pain     Admission Date  12/24/2022    CODE STATUS  Full Code    HPI & HOSPITAL COURSE  Felipa Stuart is a 77 y.o. female with past medical history of left total knee replacement in April 2022, GERD, hypothyroidism, CVA in 2016, anticoagulation with apixaban for possible A. fib, scarlet fever, who presented 12/24/2022 with complaints of progressively worsening swelling and pain in the left knee in the course of 3 days. She denies chills or fever.  She denies any trauma to left knee recently.  Left knee x-ray showed postop changes of total knee arthroplasty, large left knee joint effusion and induration of soft tissue.  No evidence of cellulitis on exam.  Ortho was consulted, patient underwent thoracentesis which revealed fluid consistent with hematoma.  Low concern for infection.  Fluid was sent for cytology and culture.  Patient's pain improved.  She was instructed to hold Eliquis until her hematoma resolves.  She was instructed to follow-up with Ortho and PCP in clinic.    Therefore, she is discharged in good and stable condition to home with close outpatient follow-up.    The patient recovered much more quickly than anticipated on admission.    Discharge Date  12/25/2022    FOLLOW UP ITEMS POST DISCHARGE  Pcp and ortho    DISCHARGE DIAGNOSES  Principal Problem:    Arthritis of left knee POA: Yes      Overview: Added automatically from request for surgery [id number]  Active Problems:    Hypothyroidism POA: Yes    Chronic anticoagulation - concern for afib (Chronic) POA: Yes    Gastroesophageal reflux disease POA: Yes      Overview: Last Assessment & Plan:       Formatting of this note might  be different from the original.      Stable      Hx of [place]'s      No red flags      Continue current dose of Omeprazole - refilled today      Continue to see GI            Schedule annual  Medicare Wellness Visit  with [facility]'s nurse       practitioner at [place] or [facility]. Call [phone] to schedule.       Or we can schedule at this clinic            RTC prn.  Pt's questions answered. Pt verbalized understanding and agreed       to plan of care.  Resolved Problems:    Knee pain POA: Yes      FOLLOW UP  Future Appointments   Date Time Provider Department Center   1/11/2023  1:30 PM [facility] [address]   1/11/2023  4:00 PM [facility] DX 1 [facility] [place]   6/27/2023 12:45 PM [facility]ALO Curtis [facility] None     [facility] ([facility])  [address] [place] [id number]  [phone]  Call in 3 day(s)        MEDICATIONS ON DISCHARGE     Medication List        START taking these medications        Instructions   oxyCODONE immediate-release 5 MG Tabs  Commonly known as: ROXICODONE   Take 1 Tablet by mouth every 6 hours as needed for Severe Pain for up to 5 days.  Dose: 5 mg            CONTINUE taking these medications        Instructions   apixaban 5mg Tabs  Commonly known as: Eliquis   Take 1 Tablet by mouth 2 times a day.  Dose: 5 mg     atorvastatin 20 MG Tabs  Commonly known as: LIPITOR   Take 1 Tab by mouth every bedtime.  Dose: 20 mg     DAILY MULTIPLE VITAMINS PO   Take 1 Tablet by mouth every day.  Dose: 1 Tablet     famotidine 20 MG Tabs  Commonly known as: PEPCID   Take 20-70 mg by mouth every day.  Dose: 20-70 mg     ferrous sulfate 325 (65 Fe) MG tablet   Take 325 mg by mouth every day.  Dose: 325 mg     Levoxyl 75 MCG Tabs  Generic drug: levothyroxine   Take 75 mcg by mouth every morning on an empty stomach.  Dose: 75 mcg     liothyronine 5 MCG Tabs  Commonly known as: CYTOMEL   Take 5 mcg by mouth every day.  Dose: 5 mcg     omeprazole 20 MG delayed-release  capsule  Commonly known as: PRILOSEC   Take 20 mg by mouth every day.  Dose: 20 mg     polyethylene glycol/lytes 17 g Pack  Commonly known as: MiraLax   Take 1 Packet by mouth every day.  Dose: 17 g     QC Acetaminophen 8Hr Arth Pain 650 MG CR tablet  Generic drug: acetaminophen   Take 1,300 mg by mouth every 8 hours as needed.  Dose: 1,300 mg     vitamin D 1000 Unit (25 mcg) Tabs  Commonly known as: cholecalciferol   Take 2,000 Units by mouth every day.  Dose: 2,000 Units              Allergies  No Known Allergies    DIET  Orders Placed This Encounter   Procedures    Diet Order Diet: Regular     Standing Status:   Standing     Number of Occurrences:   1     Order Specific Question:   Diet:     Answer:   Regular [1]       ACTIVITY  As tolerated.  Weight bearing as tolerated    CONSULTATIONS  Ortho Dr Mcconnell    PROCEDURES  Left knee arthrocentesis    LABORATORY  Lab Results   Component Value Date    SODIUM 137 12/24/2022    POTASSIUM 4.0 12/24/2022    CHLORIDE 104 12/24/2022    CO2 22 12/24/2022    GLUCOSE 106 (H) 12/24/2022    BUN 23 (H) 12/24/2022    CREATININE 0.74 12/24/2022        Lab Results   Component Value Date    WBC 7.9 12/24/2022    HEMOGLOBIN 9.1 (L) 12/24/2022    HEMATOCRIT 30.5 (L) 12/24/2022    PLATELETCT 304 12/24/2022        Total time of the discharge process exceeds 41 minutes.

## 2022-12-25 NOTE — PROGRESS NOTES
"Assumed care. A/O, sitting up in bed talking on the phone. Upon exam, she says her left knee is still swollen, but is improved from last night. Has leg elevated on a pillow. States pain in minimal, feels \"comfortable\".   "

## 2022-12-25 NOTE — H&P
Hospital Medicine History & Physical Note    Date of Service  12/24/2022    Primary Care Physician  KARTHIK Reed.    Consultants  Orthopedic surgery Dr. Vaughan    Code Status  Full Code    Chief Complaint  Chief Complaint   Patient presents with    Knee Pain     Pt has noticed increase in swelling and pain in L knee over the past 3 days. Over the past hour, pain has been so severe pt has been unable to to bear weight. Denies any trauma.        History of Presenting Illness  Felipa Stuart is a 77 y.o. female with past medical history of left total knee replacement in April 2022, GERD, hypothyroidism, CVA in 2016, anticoagulation with apixaban for possible A. fib, scarlet fever, who presented 12/24/2022 with complaints of progressively worsening swelling and pain in the left knee in the course of 3 days.  Patient was taking Tylenol which was helping until today, when her left knee swelling and pain significantly worsened, so that she could not bend her knee, could not ambulate due to pain.  Pain up to 10 out of 10, worsened with movement, sharp.  Denies chills or fever.  She denies any trauma to left knee recently.  Left knee x-ray showed postop changes of total knee arthroplasty, large left knee joint effusion and induration of soft tissue.  No evidence of cellulitis on exam.  ERP spoke to orthopedic surgery/Dr. Vaughan, who did not recommend arthrocentesis at this time, instead patient will be formally evaluated tomorrow morning.  Admitted for pain control for now.  Last dose of apixaban earlier this morning.    I discussed the plan of care with patient.    Review of Systems  Review of Systems   Constitutional:  Negative for chills, fever and weight loss.   HENT:  Negative for ear pain, hearing loss and tinnitus.    Eyes:  Negative for blurred vision, double vision and photophobia.   Respiratory:  Negative for cough, hemoptysis and sputum production.    Cardiovascular:  Negative for chest pain,  palpitations and orthopnea.   Gastrointestinal:  Negative for heartburn, nausea and vomiting.   Genitourinary:  Negative for dysuria, flank pain, frequency and hematuria.   Musculoskeletal:  Positive for joint pain. Negative for back pain and neck pain.   Skin:  Negative for itching and rash.   Neurological:  Negative for tremors, speech change, focal weakness and headaches.   Endo/Heme/Allergies:  Negative for environmental allergies and polydipsia. Does not bruise/bleed easily.   Psychiatric/Behavioral:  Negative for hallucinations and substance abuse. The patient is not nervous/anxious.      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    Surgical History   has a past surgical history that includes thyroid lobectomy (1976); cataract extraction with iol (Bilateral, 2008); other (2013); full thickness block resection (3/18/2014); other abdominal surgery (2013); other abdominal surgery (2/2016); recovery (7/13/2016); [facility] cardiac cath; other (1975); lumpectomy (Right, 1992); pr lap,esophagogast fundoplasty (7/16/2019); and pr total knee arthroplasty (Right, 10/18/2022).     Family History  family history includes Cancer in her mother.   Family history reviewed with patient. There is no family history that is pertinent to the chief complaint.     Social History   reports that she quit smoking about 43 years ago. Her smoking use included cigarettes. She has a 4.00 pack-year smoking history. She has never used smokeless tobacco. She reports current alcohol use. She reports that she does not use drugs.    Allergies  No Known  Allergies    Medications  Prior to Admission Medications   Prescriptions Last Dose Informant Patient Reported? Taking?   DAILY MULTIPLE VITAMINS PO   Yes No   Sig: Take 1 Tablet by mouth every day.   LEVOXYL 75 MCG Tab   Yes No   apixaban (ELIQUIS) 5mg Tab   No No   Sig: Take 1 Tablet by mouth 2 times a day.   atorvastatin (LIPITOR) 20 MG Tab  Patient No No   Sig: Take 1 Tab by mouth every bedtime.   omeprazole (PRILOSEC) 20 MG delayed-release capsule   Yes No   polyethylene glycol/lytes (MIRALAX) 17 g Pack   No No   Sig: Take 1 Packet by mouth every day.   vitamin D (CHOLECALCIFEROL) 1000 UNIT Tab  Patient Yes No   Sig: Take 4,000 Units by mouth every day.      Facility-Administered Medications: None       Physical Exam  Temp:  [36.7 °C (98.1 °F)] 36.7 °C (98.1 °F)  Pulse:  [70-90] 70  Resp:  [15] 15  BP: (124-169)/(70-72) 124/70  SpO2:  [95 %-96 %] 95 %  Blood Pressure : 124/70   Temperature: 36.7 °C (98.1 °F)   Pulse: 70   Respiration: 15   Pulse Oximetry: 95 %       Physical Exam  Vitals and nursing note reviewed.   Constitutional:       General: She is not in acute distress.     Appearance: Normal appearance.   HENT:      Head: Normocephalic and atraumatic.      Nose: Nose normal.      Mouth/Throat:      Mouth: Mucous membranes are moist.   Eyes:      Extraocular Movements: Extraocular movements intact.      Pupils: Pupils are equal, round, and reactive to light.   Cardiovascular:      Rate and Rhythm: Normal rate and regular rhythm.   Pulmonary:      Effort: Pulmonary effort is normal.      Breath sounds: Normal breath sounds.   Abdominal:      General: Abdomen is flat. There is no distension.      Tenderness: There is no abdominal tenderness.   Musculoskeletal:         General: No swelling or deformity.      Cervical back: Normal range of motion and neck supple.      Left knee: Swelling and effusion present. Decreased range of motion. Tenderness present.   Skin:     General: Skin is warm and dry.    Neurological:      General: No focal deficit present.      Mental Status: She is alert and oriented to person, place, and time.   Psychiatric:         Mood and Affect: Mood normal.         Behavior: Behavior normal.       Laboratory:  Recent Labs     12/24/22 1820   WBC 10.7   RBC 4.25   HEMOGLOBIN 9.4*   HEMATOCRIT 31.4*   MCV 73.9*   MCH 22.1*   MCHC 29.9*   RDW 63.0*   PLATELETCT 329   MPV 10.3     Recent Labs     12/24/22 1820   SODIUM 135   POTASSIUM 3.4*   CHLORIDE 101   CO2 21   GLUCOSE 131*   BUN 26*   CREATININE 0.89   CALCIUM 8.9     Recent Labs     12/24/22 1820   ALTSGPT 8   ASTSGOT 18   ALKPHOSPHAT 121*   TBILIRUBIN 0.7   GLUCOSE 131*     Recent Labs     12/24/22 1820   APTT 33.3   INR 1.40*     No results for input(s): NTPROBNP in the last 72 hours.      No results for input(s): TROPONINT in the last 72 hours.    Imaging:  DX-KNEE 3 VIEWS LEFT   Final Result      1.  Postoperative left total knee arthroplasty. No evidence of hardware loosening or fracture.   2.  Diffuse induration of the soft tissues about the knee. Nonspecific.   3.  Large left knee joint effusion.          X-Ray:  I have personally reviewed the images and compared with prior images.    Assessment/Plan:  Justification for Admission Status  I anticipate this patient is appropriate for observation status at this time because left knee pain    Patient will need a Med/Surg bed on ORTHOPEDICS service .  The need is secondary to left knee pain.    * Arthritis of left knee  Assessment & Plan  History of osteoarthritis of left knee, total knee arthroplasty in April 2022  Presents with significant and progressive worsening pain in the left knee in the last 3 days  Suspicion for infection is low  patient is on apixaban and mild drop in hemoglobin noted, hence hemarthrosis is on consideration  Admit for observation.  Pain control.  We will hold apixaban  Expected orthopedic surgery consult tomorrow  PT OT evaluation  afterwards    Gastroesophageal reflux disease- (present on admission)  Assessment & Plan  Continue PPI    Chronic anticoagulation - concern for afib- (present on admission)  Assessment & Plan  Holding apixaban due to above.  Last dose was in the morning of 12/24    Hypothyroidism- (present on admission)  Assessment & Plan  Continue levothyroxine and Cytomel        VTE prophylaxis: SCDs/TEDs

## 2022-12-25 NOTE — ASSESSMENT & PLAN NOTE
History of osteoarthritis of left knee, total knee arthroplasty in April 2022  Presents with significant and progressive worsening pain in the left knee in the last 3 days  Suspicion for infection is low  patient is on apixaban and mild drop in hemoglobin noted, hence hemarthrosis is on consideration  Admit for observation.  Pain control.  We will hold apixaban  Expected orthopedic surgery consult tomorrow  PT OT evaluation afterwards

## 2022-12-25 NOTE — PROGRESS NOTES
4 Eyes Skin Assessment Completed by CARLOS Ko and CARLOS Campos.    Head WDL  Ears WDL  Nose WDL  Mouth WDL  Neck WDL  Breast/Chest WDL  Shoulder Blades WDL  Spine WDL  (R) Arm/Elbow/Hand WDL  (L) Arm/Elbow/Hand WDL  Abdomen WDL  Groin WDL  Scrotum/Coccyx/Buttocks WDL  (R) Leg WDL  (L) Leg Swelling left knee swelling  (R) Heel/Foot/Toe WDL  (L) Heel/Foot/Toe WDL          Devices In Places Pulse Ox      Interventions In Place Pillows and Low Air Loss Mattress    Possible Skin Injury No    Pictures Uploaded Into Epic N/A  Wound Consult Placed N/A  RN Wound Prevention Protocol Ordered No

## 2022-12-25 NOTE — CARE PLAN
The patient is Stable - Low risk of patient condition declining or worsening    Shift Goals  Clinical Goals: Pain management and rest  Patient Goals: Pain management    Progress made toward(s) clinical / shift goals:  pain controlled patient slept throughout the night     Patient is not progressing towards the following goals: n/a      Problem: Knowledge Deficit - Standard  Goal: Patient and family/care givers will demonstrate understanding of plan of care, disease process/condition, diagnostic tests and medications  Outcome: Progressing

## 2022-12-25 NOTE — PROGRESS NOTES
Pleasant 77 F on NOAC with intermittent L knee effusion and pain for a few days, s/p TKA April. No injury. No constitutional symptoms.  Effusion, skin intact, bruising inferior to patella, no cellulitis or tenderness, painless ROM, NVI distal  RABs diagnostic arthrocentesis discussed, agrees  Chloraprep, 1%lidocaine x 5 cc  Lateral approach in sterile fashion   1cc fulid consistent with hematoma withdrawn  Sterile dressing applied  Specimen to RN Naomi  Clinically low concern for infection  No anticipation OR  WBAT  Follow with Dr Vaughan / Gilberto

## 2022-12-25 NOTE — ED TRIAGE NOTES
"Chief Complaint   Patient presents with    Knee Pain     Pt has noticed increase in swelling and pain in L knee over the past 3 days. Over the past hour, pain has been so severe pt has been unable to to bear weight. Denies any trauma.      Pt BIB EMS from home with above complaint. Pt's L knee was replaced in April of this year. Pt states she has not had any issues with pain/swelling like this since the initial procedure. Denies numbness/tingling Palpable pedal pulse.     BP (!) 169/72   Pulse 90   Temp 36.7 °C (98.1 °F) (Temporal)   Resp 15   Ht 1.778 m (5' 10\")   Wt 77.1 kg (170 lb)   SpO2 96%   BMI 24.39 kg/m²     "

## 2022-12-27 ENCOUNTER — PATIENT MESSAGE (OUTPATIENT)
Dept: CARDIOLOGY | Facility: MEDICAL CENTER | Age: 77
End: 2022-12-27
Payer: MEDICARE

## 2022-12-28 ENCOUNTER — PATIENT MESSAGE (OUTPATIENT)
Dept: CARDIOLOGY | Facility: MEDICAL CENTER | Age: 77
End: 2022-12-28
Payer: MEDICARE

## 2022-12-28 DIAGNOSIS — Z79.01 CHRONIC ANTICOAGULATION: ICD-10-CM

## 2022-12-28 DIAGNOSIS — E78.49 OTHER HYPERLIPIDEMIA: ICD-10-CM

## 2022-12-28 DIAGNOSIS — I63.9 CEREBRAL INFARCTION, UNSPECIFIED MECHANISM (HCC): ICD-10-CM

## 2022-12-29 ENCOUNTER — PATIENT MESSAGE (OUTPATIENT)
Dept: CARDIOLOGY | Facility: MEDICAL CENTER | Age: 77
End: 2022-12-29
Payer: MEDICARE

## 2022-12-29 LAB
BACTERIA BLD CULT: NORMAL
BACTERIA BLD CULT: NORMAL
SIGNIFICANT IND 70042: NORMAL
SIGNIFICANT IND 70042: NORMAL
SITE SITE: NORMAL
SITE SITE: NORMAL
SOURCE SOURCE: NORMAL
SOURCE SOURCE: NORMAL

## 2022-12-29 NOTE — PATIENT COMMUNICATION
KARTHIK Curtis.  You 20 minutes ago (9:16 AM)     Yes, OK to stop eliquis and just use ASA 81 mg QPM EC. As she has no documented afib at this time. SC        [employer] message sent to patient with SC's recommendations, awaiting patient response and will follow up as needed. Medication list updated.

## 2023-01-11 ENCOUNTER — APPOINTMENT (OUTPATIENT)
Dept: RADIOLOGY | Facility: MEDICAL CENTER | Age: 78
End: 2023-01-11
Attending: INTERNAL MEDICINE
Payer: MEDICARE

## 2023-01-11 ENCOUNTER — HOSPITAL ENCOUNTER (OUTPATIENT)
Dept: RADIOLOGY | Facility: MEDICAL CENTER | Age: 78
End: 2023-01-11
Attending: NURSE PRACTITIONER
Payer: MEDICARE

## 2023-01-11 DIAGNOSIS — R06.02 SHORTNESS OF BREATH: ICD-10-CM

## 2023-01-11 DIAGNOSIS — I20.89 ANGINA AT REST (HCC): ICD-10-CM

## 2023-01-11 DIAGNOSIS — E78.49 OTHER HYPERLIPIDEMIA: ICD-10-CM

## 2023-01-11 PROCEDURE — 78431 MYOCRD IMG PET RST&STRS CT: CPT

## 2023-01-11 PROCEDURE — 78434 AQMBF PET REST & RX STRESS: CPT | Mod: 26 | Performed by: INTERNAL MEDICINE

## 2023-01-11 PROCEDURE — 93018 CV STRESS TEST I&R ONLY: CPT | Performed by: INTERNAL MEDICINE

## 2023-01-11 PROCEDURE — 78431 MYOCRD IMG PET RST&STRS CT: CPT | Mod: 26 | Performed by: INTERNAL MEDICINE

## 2023-01-12 ENCOUNTER — HOSPITAL ENCOUNTER (OUTPATIENT)
Dept: RADIOLOGY | Facility: MEDICAL CENTER | Age: 78
End: 2023-01-12
Attending: INTERNAL MEDICINE
Payer: MEDICARE

## 2023-01-12 ENCOUNTER — PATIENT MESSAGE (OUTPATIENT)
Dept: CARDIOLOGY | Facility: MEDICAL CENTER | Age: 78
End: 2023-01-12
Payer: MEDICARE

## 2023-01-12 DIAGNOSIS — K21.9 GASTROESOPHAGEAL REFLUX DISEASE, UNSPECIFIED WHETHER ESOPHAGITIS PRESENT: ICD-10-CM

## 2023-01-12 DIAGNOSIS — R10.13 EPIGASTRIC PAIN: ICD-10-CM

## 2023-01-12 PROCEDURE — 74220 X-RAY XM ESOPHAGUS 1CNTRST: CPT

## 2023-01-12 PROCEDURE — 700117 HCHG RX CONTRAST REV CODE 255: Performed by: INTERNAL MEDICINE

## 2023-01-12 RX ADMIN — BARIUM SULFATE 700 MG: 700 TABLET ORAL at 14:46

## 2023-01-12 NOTE — PATIENT COMMUNICATION
To LH: Patient responding to result note sent to her by you earlier. With her normal results am I okay to provide clearance for her EGD? Thank you!

## 2023-01-16 ENCOUNTER — TELEPHONE (OUTPATIENT)
Dept: CARDIOLOGY | Facility: MEDICAL CENTER | Age: 78
End: 2023-01-16
Payer: MEDICARE

## 2023-01-16 NOTE — LETTER
PROCEDURE/SURGERY CLEARANCE FORM      Encounter Date: 1/16/2023    Patient: Felipa Stuart  YOB: 1945      CARDIOLOGIST:  JD Huff    REFERRING DOCTOR:                The above patient is cleared to have the following procedure/surgery: EGD                                           Additional comments:             Electronically Signed         Provider Signature   JD Huff

## 2023-01-16 NOTE — TELEPHONE ENCOUNTER
To LH - ok to proceed?     Patient taking ASA  [facility] for EGD on 1/26/2023.      Last office visit - 12/12/2022     Past Medical History:   AIVR (accelerated idioventricular rhythm) (HCC) on Zio      Anesthesia       post op nausea    Arrhythmia       bigeminal PVC's, SVT with questionable Afib    Bradycardia      CATARACT       bianka IOL    Chicken pox       as child     Chronic anticoagulation - concern for afib      Heart burn      Hiatal hernia      Hiatus hernia syndrome       repaired 2/28/2016    HX: benign breast biopsy       Rt breast    Hypothyroidism      Measles       as child     PONV (postoperative nausea and vomiting)      Scarlet fever       as child     Stroke (HCC) 2/28/2016     mild speech delay    TIA (transient ischemic attack) 06/11/2013            Return clearance to:  Phone: [phone]   Fax: [phone]

## 2023-01-16 NOTE — TELEPHONE ENCOUNTER
SC    Caller: Mihaela ([facility])    Office Name, phone number, fax number: [facility]   Fax/#: [phone]    Fax clearance to [phone]    Procedure Name: EGD    Procedure Scheduled Date: 01-26-23    Callback Number: [phone]     Thank you,    Jorge TOM

## 2023-01-27 ENCOUNTER — HOSPITAL ENCOUNTER (OUTPATIENT)
Dept: LAB | Facility: MEDICAL CENTER | Age: 78
End: 2023-01-27
Attending: NURSE PRACTITIONER
Payer: MEDICARE

## 2023-01-27 LAB
25(OH)D3 SERPL-MCNC: 71 NG/ML (ref 30–100)
ALBUMIN SERPL BCP-MCNC: 4.1 G/DL (ref 3.2–4.9)
ALBUMIN/GLOB SERPL: 1.6 G/DL
ALP SERPL-CCNC: 95 U/L (ref 30–99)
ALT SERPL-CCNC: 14 U/L (ref 2–50)
ANION GAP SERPL CALC-SCNC: 12 MMOL/L (ref 7–16)
AST SERPL-CCNC: 19 U/L (ref 12–45)
BILIRUB SERPL-MCNC: 0.5 MG/DL (ref 0.1–1.5)
BUN SERPL-MCNC: 26 MG/DL (ref 8–22)
CALCIUM ALBUM COR SERPL-MCNC: 9.1 MG/DL (ref 8.5–10.5)
CALCIUM SERPL-MCNC: 9.2 MG/DL (ref 8.5–10.5)
CHLORIDE SERPL-SCNC: 104 MMOL/L (ref 96–112)
CHOLEST SERPL-MCNC: 154 MG/DL (ref 100–199)
CO2 SERPL-SCNC: 24 MMOL/L (ref 20–33)
CREAT SERPL-MCNC: 0.74 MG/DL (ref 0.5–1.4)
ERYTHROCYTE [DISTWIDTH] IN BLOOD BY AUTOMATED COUNT: 74.8 FL (ref 35.9–50)
FASTING STATUS PATIENT QL REPORTED: NORMAL
GFR SERPLBLD CREATININE-BSD FMLA CKD-EPI: 82 ML/MIN/1.73 M 2
GLOBULIN SER CALC-MCNC: 2.5 G/DL (ref 1.9–3.5)
GLUCOSE SERPL-MCNC: 86 MG/DL (ref 65–99)
HCT VFR BLD AUTO: 38.1 % (ref 37–47)
HDLC SERPL-MCNC: 59 MG/DL
HGB BLD-MCNC: 11.4 G/DL (ref 12–16)
IRON SATN MFR SERPL: 87 % (ref 15–55)
IRON SERPL-MCNC: 269 UG/DL (ref 40–170)
LDLC SERPL CALC-MCNC: 79 MG/DL
MCH RBC QN AUTO: 24.7 PG (ref 27–33)
MCHC RBC AUTO-ENTMCNC: 29.9 G/DL (ref 33.6–35)
MCV RBC AUTO: 82.5 FL (ref 81.4–97.8)
MORPHOLOGY BLD-IMP: NORMAL
PLATELET # BLD AUTO: 239 K/UL (ref 164–446)
POTASSIUM SERPL-SCNC: 4.3 MMOL/L (ref 3.6–5.5)
PROT SERPL-MCNC: 6.6 G/DL (ref 6–8.2)
RBC # BLD AUTO: 4.62 M/UL (ref 4.2–5.4)
SODIUM SERPL-SCNC: 140 MMOL/L (ref 135–145)
TIBC SERPL-MCNC: 308 UG/DL (ref 250–450)
TRIGL SERPL-MCNC: 82 MG/DL (ref 0–149)
UIBC SERPL-MCNC: 39 UG/DL (ref 110–370)
WBC # BLD AUTO: 5.7 K/UL (ref 4.8–10.8)

## 2023-01-27 PROCEDURE — 85027 COMPLETE CBC AUTOMATED: CPT

## 2023-01-27 PROCEDURE — 83540 ASSAY OF IRON: CPT

## 2023-01-27 PROCEDURE — 36415 COLL VENOUS BLD VENIPUNCTURE: CPT

## 2023-01-27 PROCEDURE — 80053 COMPREHEN METABOLIC PANEL: CPT

## 2023-01-27 PROCEDURE — 80061 LIPID PANEL: CPT

## 2023-01-27 PROCEDURE — 82306 VITAMIN D 25 HYDROXY: CPT

## 2023-01-27 PROCEDURE — 83550 IRON BINDING TEST: CPT

## 2023-02-08 ENCOUNTER — HOSPITAL ENCOUNTER (OUTPATIENT)
Dept: LAB | Facility: MEDICAL CENTER | Age: 78
End: 2023-02-08
Attending: INTERNAL MEDICINE
Payer: MEDICARE

## 2023-02-08 LAB
25(OH)D3 SERPL-MCNC: 78 NG/ML (ref 30–100)
CA-I SERPL-SCNC: 1.2 MMOL/L (ref 1.1–1.3)
T3FREE SERPL-MCNC: 2.49 PG/ML (ref 2–4.4)
T4 FREE SERPL-MCNC: 1.05 NG/DL (ref 0.93–1.7)
TSH SERPL DL<=0.005 MIU/L-ACNC: 0.31 UIU/ML (ref 0.38–5.33)

## 2023-02-08 PROCEDURE — 84481 FREE ASSAY (FT-3): CPT

## 2023-02-08 PROCEDURE — 84443 ASSAY THYROID STIM HORMONE: CPT

## 2023-02-08 PROCEDURE — 82306 VITAMIN D 25 HYDROXY: CPT

## 2023-02-08 PROCEDURE — 82330 ASSAY OF CALCIUM: CPT

## 2023-02-08 PROCEDURE — 36415 COLL VENOUS BLD VENIPUNCTURE: CPT

## 2023-02-08 PROCEDURE — 84439 ASSAY OF FREE THYROXINE: CPT

## 2023-03-13 ENCOUNTER — PRE-ADMISSION TESTING (OUTPATIENT)
Dept: ADMISSIONS | Facility: MEDICAL CENTER | Age: 78
End: 2023-03-13
Attending: SURGERY
Payer: MEDICARE

## 2023-03-13 DIAGNOSIS — Z01.810 PRE-OPERATIVE CARDIOVASCULAR EXAMINATION: ICD-10-CM

## 2023-03-13 DIAGNOSIS — Z01.812 PRE-OPERATIVE LABORATORY EXAMINATION: ICD-10-CM

## 2023-03-13 LAB
ANION GAP SERPL CALC-SCNC: 11 MMOL/L (ref 7–16)
BUN SERPL-MCNC: 25 MG/DL (ref 8–22)
CALCIUM SERPL-MCNC: 9.6 MG/DL (ref 8.5–10.5)
CHLORIDE SERPL-SCNC: 105 MMOL/L (ref 96–112)
CO2 SERPL-SCNC: 25 MMOL/L (ref 20–33)
CREAT SERPL-MCNC: 0.98 MG/DL (ref 0.5–1.4)
ERYTHROCYTE [DISTWIDTH] IN BLOOD BY AUTOMATED COUNT: 58.9 FL (ref 35.9–50)
GFR SERPLBLD CREATININE-BSD FMLA CKD-EPI: 59 ML/MIN/1.73 M 2
GLUCOSE SERPL-MCNC: 94 MG/DL (ref 65–99)
HCT VFR BLD AUTO: 46 % (ref 37–47)
HGB BLD-MCNC: 14.9 G/DL (ref 12–16)
MCH RBC QN AUTO: 27.2 PG (ref 27–33)
MCHC RBC AUTO-ENTMCNC: 32.4 G/DL (ref 33.6–35)
MCV RBC AUTO: 84.1 FL (ref 81.4–97.8)
PLATELET # BLD AUTO: 256 K/UL (ref 164–446)
PMV BLD AUTO: 10.4 FL (ref 9–12.9)
POTASSIUM SERPL-SCNC: 4.4 MMOL/L (ref 3.6–5.5)
RBC # BLD AUTO: 5.47 M/UL (ref 4.2–5.4)
SODIUM SERPL-SCNC: 141 MMOL/L (ref 135–145)
WBC # BLD AUTO: 7.2 K/UL (ref 4.8–10.8)

## 2023-03-13 PROCEDURE — 80048 BASIC METABOLIC PNL TOTAL CA: CPT

## 2023-03-13 PROCEDURE — 36415 COLL VENOUS BLD VENIPUNCTURE: CPT

## 2023-03-13 PROCEDURE — 85027 COMPLETE CBC AUTOMATED: CPT

## 2023-03-13 ASSESSMENT — FIBROSIS 4 INDEX: FIB4 SCORE: 1.66

## 2023-03-24 ENCOUNTER — ANESTHESIA EVENT (OUTPATIENT)
Dept: SURGERY | Facility: MEDICAL CENTER | Age: 78
End: 2023-03-24
Payer: MEDICARE

## 2023-03-27 ENCOUNTER — HOSPITAL ENCOUNTER (OUTPATIENT)
Facility: MEDICAL CENTER | Age: 78
End: 2023-03-28
Attending: SURGERY | Admitting: SURGERY
Payer: MEDICARE

## 2023-03-27 ENCOUNTER — ANESTHESIA (OUTPATIENT)
Dept: SURGERY | Facility: MEDICAL CENTER | Age: 78
End: 2023-03-27
Payer: MEDICARE

## 2023-03-27 ENCOUNTER — APPOINTMENT (OUTPATIENT)
Dept: RADIOLOGY | Facility: MEDICAL CENTER | Age: 78
End: 2023-03-27
Attending: PHYSICIAN ASSISTANT
Payer: MEDICARE

## 2023-03-27 DIAGNOSIS — G89.18 POSTOPERATIVE PAIN: ICD-10-CM

## 2023-03-27 PROBLEM — K44.9 PARAESOPHAGEAL HIATAL HERNIA: Status: ACTIVE | Noted: 2023-03-27

## 2023-03-27 PROCEDURE — 700102 HCHG RX REV CODE 250 W/ 637 OVERRIDE(OP): Performed by: STUDENT IN AN ORGANIZED HEALTH CARE EDUCATION/TRAINING PROGRAM

## 2023-03-27 PROCEDURE — 700105 HCHG RX REV CODE 258: Performed by: SURGERY

## 2023-03-27 PROCEDURE — 96365 THER/PROPH/DIAG IV INF INIT: CPT

## 2023-03-27 PROCEDURE — 700111 HCHG RX REV CODE 636 W/ 250 OVERRIDE (IP): Performed by: STUDENT IN AN ORGANIZED HEALTH CARE EDUCATION/TRAINING PROGRAM

## 2023-03-27 PROCEDURE — 700111 HCHG RX REV CODE 636 W/ 250 OVERRIDE (IP)

## 2023-03-27 PROCEDURE — 96375 TX/PRO/DX INJ NEW DRUG ADDON: CPT

## 2023-03-27 PROCEDURE — 700101 HCHG RX REV CODE 250: Performed by: STUDENT IN AN ORGANIZED HEALTH CARE EDUCATION/TRAINING PROGRAM

## 2023-03-27 PROCEDURE — C1781 MESH (IMPLANTABLE): HCPCS | Performed by: SURGERY

## 2023-03-27 PROCEDURE — 160048 HCHG OR STATISTICAL LEVEL 1-5: Performed by: SURGERY

## 2023-03-27 PROCEDURE — G0378 HOSPITAL OBSERVATION PER HR: HCPCS

## 2023-03-27 PROCEDURE — 160028 HCHG SURGERY MINUTES - 1ST 30 MINS LEVEL 3: Performed by: SURGERY

## 2023-03-27 PROCEDURE — RXMED WILLOW AMBULATORY MEDICATION CHARGE: Performed by: PHYSICIAN ASSISTANT

## 2023-03-27 PROCEDURE — A9270 NON-COVERED ITEM OR SERVICE: HCPCS | Performed by: STUDENT IN AN ORGANIZED HEALTH CARE EDUCATION/TRAINING PROGRAM

## 2023-03-27 PROCEDURE — 160039 HCHG SURGERY MINUTES - EA ADDL 1 MIN LEVEL 3: Performed by: SURGERY

## 2023-03-27 PROCEDURE — 96376 TX/PRO/DX INJ SAME DRUG ADON: CPT

## 2023-03-27 PROCEDURE — 160009 HCHG ANES TIME/MIN: Performed by: SURGERY

## 2023-03-27 PROCEDURE — 700101 HCHG RX REV CODE 250: Performed by: SURGERY

## 2023-03-27 PROCEDURE — 00790 ANES IPER UPR ABD NOS: CPT | Performed by: STUDENT IN AN ORGANIZED HEALTH CARE EDUCATION/TRAINING PROGRAM

## 2023-03-27 PROCEDURE — 160035 HCHG PACU - 1ST 60 MINS PHASE I: Performed by: SURGERY

## 2023-03-27 PROCEDURE — 700111 HCHG RX REV CODE 636 W/ 250 OVERRIDE (IP): Performed by: PHYSICIAN ASSISTANT

## 2023-03-27 PROCEDURE — 99100 ANES PT EXTEME AGE<1 YR&>70: CPT | Performed by: STUDENT IN AN ORGANIZED HEALTH CARE EDUCATION/TRAINING PROGRAM

## 2023-03-27 PROCEDURE — 71045 X-RAY EXAM CHEST 1 VIEW: CPT

## 2023-03-27 PROCEDURE — 160002 HCHG RECOVERY MINUTES (STAT): Performed by: SURGERY

## 2023-03-27 PROCEDURE — 700105 HCHG RX REV CODE 258: Performed by: PHYSICIAN ASSISTANT

## 2023-03-27 DEVICE — MESH SURGICAL PHASIX SEPRA 7X10CM: Type: IMPLANTABLE DEVICE | Site: ABDOMEN | Status: FUNCTIONAL

## 2023-03-27 RX ORDER — OXYCODONE HCL 5 MG/5 ML
5 SOLUTION, ORAL ORAL
Status: COMPLETED | OUTPATIENT
Start: 2023-03-27 | End: 2023-03-27

## 2023-03-27 RX ORDER — ENOXAPARIN SODIUM 100 MG/ML
40 INJECTION SUBCUTANEOUS DAILY
Status: DISCONTINUED | OUTPATIENT
Start: 2023-03-28 | End: 2023-03-28 | Stop reason: HOSPADM

## 2023-03-27 RX ORDER — ACETAMINOPHEN 500 MG
TABLET ORAL
Status: COMPLETED
Start: 2023-03-27 | End: 2023-03-27

## 2023-03-27 RX ORDER — DIPHENHYDRAMINE HYDROCHLORIDE 50 MG/ML
25 INJECTION INTRAMUSCULAR; INTRAVENOUS EVERY 6 HOURS PRN
Status: DISCONTINUED | OUTPATIENT
Start: 2023-03-27 | End: 2023-03-28 | Stop reason: HOSPADM

## 2023-03-27 RX ORDER — ONDANSETRON 2 MG/ML
4 INJECTION INTRAMUSCULAR; INTRAVENOUS
Status: DISCONTINUED | OUTPATIENT
Start: 2023-03-27 | End: 2023-03-27 | Stop reason: HOSPADM

## 2023-03-27 RX ORDER — LIDOCAINE HYDROCHLORIDE 20 MG/ML
INJECTION, SOLUTION EPIDURAL; INFILTRATION; INTRACAUDAL; PERINEURAL PRN
Status: DISCONTINUED | OUTPATIENT
Start: 2023-03-27 | End: 2023-03-27 | Stop reason: SURG

## 2023-03-27 RX ORDER — CEFAZOLIN SODIUM 1 G/3ML
INJECTION, POWDER, FOR SOLUTION INTRAMUSCULAR; INTRAVENOUS PRN
Status: DISCONTINUED | OUTPATIENT
Start: 2023-03-27 | End: 2023-03-27 | Stop reason: SURG

## 2023-03-27 RX ORDER — ROCURONIUM BROMIDE 10 MG/ML
INJECTION, SOLUTION INTRAVENOUS PRN
Status: DISCONTINUED | OUTPATIENT
Start: 2023-03-27 | End: 2023-03-27 | Stop reason: SURG

## 2023-03-27 RX ORDER — SODIUM CHLORIDE, SODIUM LACTATE, POTASSIUM CHLORIDE, CALCIUM CHLORIDE 600; 310; 30; 20 MG/100ML; MG/100ML; MG/100ML; MG/100ML
INJECTION, SOLUTION INTRAVENOUS CONTINUOUS
Status: ACTIVE | OUTPATIENT
Start: 2023-03-27 | End: 2023-03-27

## 2023-03-27 RX ORDER — ACETAMINOPHEN 325 MG/1
TABLET ORAL PRN
Status: DISCONTINUED | OUTPATIENT
Start: 2023-03-27 | End: 2023-03-27 | Stop reason: SURG

## 2023-03-27 RX ORDER — LEVOTHYROXINE SODIUM 0.07 MG/1
75 TABLET ORAL
Status: DISCONTINUED | OUTPATIENT
Start: 2023-03-28 | End: 2023-03-28 | Stop reason: HOSPADM

## 2023-03-27 RX ORDER — HYDRALAZINE HYDROCHLORIDE 20 MG/ML
10 INJECTION INTRAMUSCULAR; INTRAVENOUS EVERY 6 HOURS PRN
Status: DISCONTINUED | OUTPATIENT
Start: 2023-03-27 | End: 2023-03-28 | Stop reason: HOSPADM

## 2023-03-27 RX ORDER — PHENYLEPHRINE HCL IN 0.9% NACL 0.5 MG/5ML
SYRINGE (ML) INTRAVENOUS PRN
Status: DISCONTINUED | OUTPATIENT
Start: 2023-03-27 | End: 2023-03-27 | Stop reason: SURG

## 2023-03-27 RX ORDER — ENALAPRILAT 1.25 MG/ML
2.5 INJECTION INTRAVENOUS EVERY 6 HOURS PRN
Status: DISCONTINUED | OUTPATIENT
Start: 2023-03-27 | End: 2023-03-28 | Stop reason: HOSPADM

## 2023-03-27 RX ORDER — HYDROMORPHONE HYDROCHLORIDE 1 MG/ML
0.2 INJECTION, SOLUTION INTRAMUSCULAR; INTRAVENOUS; SUBCUTANEOUS
Status: DISCONTINUED | OUTPATIENT
Start: 2023-03-27 | End: 2023-03-27 | Stop reason: HOSPADM

## 2023-03-27 RX ORDER — ONDANSETRON 2 MG/ML
INJECTION INTRAMUSCULAR; INTRAVENOUS PRN
Status: DISCONTINUED | OUTPATIENT
Start: 2023-03-27 | End: 2023-03-27 | Stop reason: SURG

## 2023-03-27 RX ORDER — MORPHINE SULFATE 4 MG/ML
2-4 INJECTION INTRAVENOUS
Status: DISCONTINUED | OUTPATIENT
Start: 2023-03-27 | End: 2023-03-28 | Stop reason: HOSPADM

## 2023-03-27 RX ORDER — BUPIVACAINE HYDROCHLORIDE AND EPINEPHRINE 5; 5 MG/ML; UG/ML
INJECTION, SOLUTION EPIDURAL; INTRACAUDAL; PERINEURAL
Status: DISCONTINUED | OUTPATIENT
Start: 2023-03-27 | End: 2023-03-27 | Stop reason: HOSPADM

## 2023-03-27 RX ORDER — HALOPERIDOL 5 MG/ML
1 INJECTION INTRAMUSCULAR
Status: DISCONTINUED | OUTPATIENT
Start: 2023-03-27 | End: 2023-03-27 | Stop reason: HOSPADM

## 2023-03-27 RX ORDER — HYDROMORPHONE HYDROCHLORIDE 2 MG/ML
INJECTION, SOLUTION INTRAMUSCULAR; INTRAVENOUS; SUBCUTANEOUS PRN
Status: DISCONTINUED | OUTPATIENT
Start: 2023-03-27 | End: 2023-03-27 | Stop reason: SURG

## 2023-03-27 RX ORDER — KETOROLAC TROMETHAMINE 30 MG/ML
15 INJECTION, SOLUTION INTRAMUSCULAR; INTRAVENOUS EVERY 6 HOURS PRN
Status: DISCONTINUED | OUTPATIENT
Start: 2023-03-27 | End: 2023-03-28 | Stop reason: HOSPADM

## 2023-03-27 RX ORDER — LIOTHYRONINE SODIUM 5 UG/1
5 TABLET ORAL DAILY
Status: DISCONTINUED | OUTPATIENT
Start: 2023-03-28 | End: 2023-03-28 | Stop reason: HOSPADM

## 2023-03-27 RX ORDER — SODIUM CHLORIDE, SODIUM LACTATE, POTASSIUM CHLORIDE, CALCIUM CHLORIDE 600; 310; 30; 20 MG/100ML; MG/100ML; MG/100ML; MG/100ML
INJECTION, SOLUTION INTRAVENOUS CONTINUOUS
Status: DISCONTINUED | OUTPATIENT
Start: 2023-03-27 | End: 2023-03-28 | Stop reason: HOSPADM

## 2023-03-27 RX ORDER — DEXAMETHASONE SODIUM PHOSPHATE 4 MG/ML
INJECTION, SOLUTION INTRA-ARTICULAR; INTRALESIONAL; INTRAMUSCULAR; INTRAVENOUS; SOFT TISSUE PRN
Status: DISCONTINUED | OUTPATIENT
Start: 2023-03-27 | End: 2023-03-27 | Stop reason: SURG

## 2023-03-27 RX ORDER — OXYCODONE HCL 5 MG/5 ML
10 SOLUTION, ORAL ORAL
Status: COMPLETED | OUTPATIENT
Start: 2023-03-27 | End: 2023-03-27

## 2023-03-27 RX ORDER — HYDROMORPHONE HYDROCHLORIDE 1 MG/ML
0.4 INJECTION, SOLUTION INTRAMUSCULAR; INTRAVENOUS; SUBCUTANEOUS
Status: DISCONTINUED | OUTPATIENT
Start: 2023-03-27 | End: 2023-03-27 | Stop reason: HOSPADM

## 2023-03-27 RX ORDER — MORPHINE SULFATE 2 MG/ML
1-5 INJECTION, SOLUTION INTRAMUSCULAR; INTRAVENOUS
Status: DISCONTINUED | OUTPATIENT
Start: 2023-03-27 | End: 2023-03-27

## 2023-03-27 RX ORDER — ONDANSETRON 4 MG/1
4 TABLET, ORALLY DISINTEGRATING ORAL EVERY 4 HOURS PRN
Status: DISCONTINUED | OUTPATIENT
Start: 2023-03-27 | End: 2023-03-28 | Stop reason: HOSPADM

## 2023-03-27 RX ORDER — ONDANSETRON 2 MG/ML
4 INJECTION INTRAMUSCULAR; INTRAVENOUS EVERY 4 HOURS PRN
Status: DISCONTINUED | OUTPATIENT
Start: 2023-03-27 | End: 2023-03-28 | Stop reason: HOSPADM

## 2023-03-27 RX ORDER — EPHEDRINE SULFATE 50 MG/ML
INJECTION, SOLUTION INTRAVENOUS PRN
Status: DISCONTINUED | OUTPATIENT
Start: 2023-03-27 | End: 2023-03-27 | Stop reason: SURG

## 2023-03-27 RX ORDER — KETOROLAC TROMETHAMINE 30 MG/ML
INJECTION, SOLUTION INTRAMUSCULAR; INTRAVENOUS
Status: COMPLETED
Start: 2023-03-27 | End: 2023-03-27

## 2023-03-27 RX ORDER — HYDROMORPHONE HYDROCHLORIDE 1 MG/ML
0.1 INJECTION, SOLUTION INTRAMUSCULAR; INTRAVENOUS; SUBCUTANEOUS
Status: DISCONTINUED | OUTPATIENT
Start: 2023-03-27 | End: 2023-03-27 | Stop reason: HOSPADM

## 2023-03-27 RX ADMIN — FENTANYL CITRATE 50 MCG: 50 INJECTION, SOLUTION INTRAMUSCULAR; INTRAVENOUS at 13:33

## 2023-03-27 RX ADMIN — Medication 100 MCG: at 12:49

## 2023-03-27 RX ADMIN — Medication 100 MCG: at 13:08

## 2023-03-27 RX ADMIN — HYDROMORPHONE HYDROCHLORIDE 0.2 MG: 1 INJECTION, SOLUTION INTRAMUSCULAR; INTRAVENOUS; SUBCUTANEOUS at 14:00

## 2023-03-27 RX ADMIN — ROCURONIUM BROMIDE 10 MG: 10 INJECTION, SOLUTION INTRAVENOUS at 12:31

## 2023-03-27 RX ADMIN — KETOROLAC TROMETHAMINE 15 MG: 30 INJECTION, SOLUTION INTRAMUSCULAR at 14:02

## 2023-03-27 RX ADMIN — CEFAZOLIN 2 G: 330 INJECTION, POWDER, FOR SOLUTION INTRAMUSCULAR; INTRAVENOUS at 11:42

## 2023-03-27 RX ADMIN — ACETAMINOPHEN 1000 MG: 325 TABLET, FILM COATED ORAL at 11:22

## 2023-03-27 RX ADMIN — SUGAMMADEX 200 MG: 100 INJECTION, SOLUTION INTRAVENOUS at 13:18

## 2023-03-27 RX ADMIN — PROPOFOL 50 MG: 10 INJECTION, EMULSION INTRAVENOUS at 12:09

## 2023-03-27 RX ADMIN — FAMOTIDINE 20 MG: 10 INJECTION INTRAVENOUS at 18:30

## 2023-03-27 RX ADMIN — Medication 100 MCG: at 12:46

## 2023-03-27 RX ADMIN — FENTANYL CITRATE 50 MCG: 50 INJECTION, SOLUTION INTRAMUSCULAR; INTRAVENOUS at 12:28

## 2023-03-27 RX ADMIN — KETOROLAC TROMETHAMINE 15 MG: 30 INJECTION, SOLUTION INTRAMUSCULAR; INTRAVENOUS at 14:02

## 2023-03-27 RX ADMIN — ROCURONIUM BROMIDE 50 MG: 10 INJECTION, SOLUTION INTRAVENOUS at 11:42

## 2023-03-27 RX ADMIN — ONDANSETRON 4 MG: 2 INJECTION INTRAMUSCULAR; INTRAVENOUS at 11:48

## 2023-03-27 RX ADMIN — LIDOCAINE HYDROCHLORIDE 100 MG: 20 INJECTION, SOLUTION EPIDURAL; INFILTRATION; INTRACAUDAL at 11:42

## 2023-03-27 RX ADMIN — FENTANYL CITRATE 50 MCG: 50 INJECTION, SOLUTION INTRAMUSCULAR; INTRAVENOUS at 12:11

## 2023-03-27 RX ADMIN — FENTANYL CITRATE 100 MCG: 50 INJECTION, SOLUTION INTRAMUSCULAR; INTRAVENOUS at 11:42

## 2023-03-27 RX ADMIN — FENTANYL CITRATE 50 MCG: 50 INJECTION, SOLUTION INTRAMUSCULAR; INTRAVENOUS at 12:19

## 2023-03-27 RX ADMIN — HYDROMORPHONE HYDROCHLORIDE 0.4 MG: 1 INJECTION, SOLUTION INTRAMUSCULAR; INTRAVENOUS; SUBCUTANEOUS at 13:52

## 2023-03-27 RX ADMIN — SODIUM CHLORIDE, POTASSIUM CHLORIDE, SODIUM LACTATE AND CALCIUM CHLORIDE: 600; 310; 30; 20 INJECTION, SOLUTION INTRAVENOUS at 16:11

## 2023-03-27 RX ADMIN — HYDROMORPHONE HYDROCHLORIDE 0.4 MG: 1 INJECTION, SOLUTION INTRAMUSCULAR; INTRAVENOUS; SUBCUTANEOUS at 13:42

## 2023-03-27 RX ADMIN — MORPHINE SULFATE 4 MG: 4 INJECTION, SOLUTION INTRAMUSCULAR; INTRAVENOUS at 18:50

## 2023-03-27 RX ADMIN — METHOCARBAMOL 500 MG: 100 INJECTION INTRAMUSCULAR; INTRAVENOUS at 14:07

## 2023-03-27 RX ADMIN — PROPOFOL 100 MG: 10 INJECTION, EMULSION INTRAVENOUS at 11:42

## 2023-03-27 RX ADMIN — SODIUM CHLORIDE, POTASSIUM CHLORIDE, SODIUM LACTATE AND CALCIUM CHLORIDE: 600; 310; 30; 20 INJECTION, SOLUTION INTRAVENOUS at 11:14

## 2023-03-27 RX ADMIN — EPHEDRINE SULFATE 10 MG: 50 INJECTION, SOLUTION INTRAVENOUS at 12:05

## 2023-03-27 RX ADMIN — KETOROLAC TROMETHAMINE 15 MG: 30 INJECTION, SOLUTION INTRAMUSCULAR at 20:48

## 2023-03-27 RX ADMIN — HYDROMORPHONE HYDROCHLORIDE 0.4 MG: 2 INJECTION INTRAMUSCULAR; INTRAVENOUS; SUBCUTANEOUS at 12:32

## 2023-03-27 RX ADMIN — EPHEDRINE SULFATE 15 MG: 50 INJECTION, SOLUTION INTRAVENOUS at 11:49

## 2023-03-27 RX ADMIN — EPHEDRINE SULFATE 5 MG: 50 INJECTION, SOLUTION INTRAVENOUS at 11:52

## 2023-03-27 RX ADMIN — DEXAMETHASONE SODIUM PHOSPHATE 4 MG: 4 INJECTION, SOLUTION INTRA-ARTICULAR; INTRALESIONAL; INTRAMUSCULAR; INTRAVENOUS; SOFT TISSUE at 11:48

## 2023-03-27 RX ADMIN — OXYCODONE HYDROCHLORIDE 5 MG: 5 SOLUTION ORAL at 13:41

## 2023-03-27 RX ADMIN — FENTANYL CITRATE 50 MCG: 50 INJECTION, SOLUTION INTRAMUSCULAR; INTRAVENOUS at 13:39

## 2023-03-27 ASSESSMENT — LIFESTYLE VARIABLES
HAVE YOU EVER FELT YOU SHOULD CUT DOWN ON YOUR DRINKING: YES
HOW MANY TIMES IN THE PAST YEAR HAVE YOU HAD 5 OR MORE DRINKS IN A DAY: 0
DOES PATIENT WANT TO STOP DRINKING: NO
TOTAL SCORE: 1
ON A TYPICAL DAY WHEN YOU DRINK ALCOHOL HOW MANY DRINKS DO YOU HAVE: 0
EVER FELT BAD OR GUILTY ABOUT YOUR DRINKING: NO
AVERAGE NUMBER OF DAYS PER WEEK YOU HAVE A DRINK CONTAINING ALCOHOL: 0
HAVE PEOPLE ANNOYED YOU BY CRITICIZING YOUR DRINKING: NO
CONSUMPTION TOTAL: NEGATIVE
TOTAL SCORE: 1
EVER HAD A DRINK FIRST THING IN THE MORNING TO STEADY YOUR NERVES TO GET RID OF A HANGOVER: NO
ALCOHOL_USE: NO
TOTAL SCORE: 1

## 2023-03-27 ASSESSMENT — PAIN DESCRIPTION - PAIN TYPE
TYPE: SURGICAL PAIN
TYPE: ACUTE PAIN
TYPE: ACUTE PAIN

## 2023-03-27 ASSESSMENT — COGNITIVE AND FUNCTIONAL STATUS - GENERAL
SUGGESTED CMS G CODE MODIFIER MOBILITY: CK
DRESSING REGULAR LOWER BODY CLOTHING: A LITTLE
TURNING FROM BACK TO SIDE WHILE IN FLAT BAD: A LITTLE
WALKING IN HOSPITAL ROOM: A LOT
DAILY ACTIVITIY SCORE: 18
PERSONAL GROOMING: A LITTLE
STANDING UP FROM CHAIR USING ARMS: A LITTLE
MOBILITY SCORE: 17
SUGGESTED CMS G CODE MODIFIER DAILY ACTIVITY: CK
CLIMB 3 TO 5 STEPS WITH RAILING: A LITTLE
MOVING FROM LYING ON BACK TO SITTING ON SIDE OF FLAT BED: A LITTLE
MOVING TO AND FROM BED TO CHAIR: A LITTLE
TOILETING: A LITTLE
EATING MEALS: A LITTLE
HELP NEEDED FOR BATHING: A LITTLE
DRESSING REGULAR UPPER BODY CLOTHING: A LITTLE

## 2023-03-27 ASSESSMENT — PATIENT HEALTH QUESTIONNAIRE - PHQ9
SUM OF ALL RESPONSES TO PHQ9 QUESTIONS 1 AND 2: 0
2. FEELING DOWN, DEPRESSED, IRRITABLE, OR HOPELESS: NOT AT ALL
1. LITTLE INTEREST OR PLEASURE IN DOING THINGS: NOT AT ALL

## 2023-03-27 ASSESSMENT — FIBROSIS 4 INDEX: FIB4 SCORE: 1.55

## 2023-03-27 NOTE — OR NURSING
Patient restless, moaning, turning in bed upon arrival to PACU. Medicated for pain with positive effect. Patient denies nausea, taking sips of water. Discharge per protocol to next level of care. Family updated of status.

## 2023-03-27 NOTE — PROGRESS NOTES
Assumed care of patient at 1315. Telephone  report received from PACU RN. Assessment complete.  AA&Ox4. Denies CP/SOB.  Reporting mild pain. Declined intervention at this time. Educated patient regarding pharmacologic and non pharmacologic modalities for pain management.  Skin per flow sheets. 5 lap sites to abdomen with gauze and teg.  On clear liquid diet. Denies N/V.  - void. - BM. Last BM pta  Pt has not ambulated since surgery, slide board utilized to transfer to bed.   Fall prevention measures in place per flowsheets.  SCD's in use/Educated on SCD use, patient declined at this time. Pharmacologic VTE prophylaxis in use.  Plan of care discussed, all questions answered.  Educated regarding importance of oral care. Oral care kit at bedside. Call light is within reach, treaded slipper socks on, bed in lowest/ locked position, hourly rounding in place, all needs met at this time.

## 2023-03-27 NOTE — PROGRESS NOTES
4 Eyes Skin Assessment Completed by CARLOS Chau and CARLOS Peña.    Head WDL  Ears WDL  Nose WDL  Mouth WDL  Neck WDL  Breast/Chest WDL  Shoulder Blades WDL  Spine WDL  (R) Arm/Elbow/Hand WDL  (L) Arm/Elbow/Hand WDL  Abdomen Incision x 5 gauze and teg  Groin WDL  Scrotum/Coccyx/Buttocks WDL  (R) Leg  slight scratch to lower leg  (L) Leg small bruise to knee, slight scratch to lower leg  (R) Heel/Foot/Toe Discoloration  (L) Heel/Foot/Toe Discoloration          Devices In Places Blood Pressure Cuff, Pulse Ox, SCD's, and Oxy Mask      Interventions In Place Pillows and Pressure Redistribution Mattress    Possible Skin Injury No    Pictures Uploaded Into Epic N/A  Wound Consult Placed N/A  RN Wound Prevention Protocol Ordered No

## 2023-03-27 NOTE — ANESTHESIA PROCEDURE NOTES
Airway    Date/Time: 3/27/2023 11:45 AM  Performed by: Marco Rodriguez M.D.  Authorized by: Marco Rodriguez M.D.     Location:  OR  Urgency:  Elective  Indications for Airway Management:  Anesthesia      Spontaneous Ventilation: absent    Sedation Level:  Deep  Preoxygenated: Yes    Patient Position:  Sniffing  Mask Difficulty Assessment:  1 - vent by mask  Final Airway Type:  Endotracheal airway  Final Endotracheal Airway:  ETT  Cuffed: Yes    Technique Used for Successful ETT Placement:  Direct laryngoscopy  Devices/Methods Used in Placement:  Intubating stylet    Insertion Site:  Oral  Blade Type:  Damaris  Laryngoscope Blade/Videolaryngoscope Blade Size:  3  ETT Size (mm):  6.5  Measured from:  Teeth  ETT to Teeth (cm):  21  Placement Verified by: capnometry    Cormack-Lehane Classification:  Grade IIa - partial view of glottis  Number of Attempts at Approach:  1

## 2023-03-27 NOTE — ANESTHESIA TIME REPORT
Anesthesia Start and Stop Event Times     Date Time Event    3/27/2023 1127 Ready for Procedure     1135 Anesthesia Start     1327 Anesthesia Stop        Responsible Staff  03/27/23    Name Role Begin End    Min RAFA Rodriguez M.D. Anesth 1135 1327        Overtime Reason:  no overtime (within assigned shift)    Comments:

## 2023-03-27 NOTE — DISCHARGE INSTRUCTIONS
1. Clears without carbonation today. Please ensure patient able to tolerate several ounces (small sips at a time) of water/clears without dysphagia or vomiting/regurgitation before sending home.  2. Advance diet as tolerated to full liquids tomorrow. Follow diet progression handout given at preop appt.  3. Up ad santos.  4. Ok to Shower over Tegaderms ; remove Tegaderms in four days.  5. No baths or soaks x 14 days.  6. No driving x 4-5 days.  7. No lifting > 15 lbs until x 4 weeks.  8. Stop PPI (omeprazole) at home.  9. All home med's larger in size than eraser head should be crushed or changed to liquid form x 1-2 weeks, while on liquid diet.  10. Hold MVI/supplements until after postop check.  11. Follow-up with Dr. Ganser ~ 1-2 weeks.    CALL IF YOU:     (1) fevers greater than 101.0 degrees F;    (2) Unusual chest or leg pain, redness or swelling behind the knee or in the calf muscle;    (3) Drainage or fluid from incision that may be foul smelling, increased tenderness or soreness at the wound or the wound edges are no longer together, redness or swelling at the   incision site.    (4) Please do not hesitate to call with any other questions. ([phone]) Hiatal Hernia    A hiatal hernia occurs when part of the stomach slides above the muscle that separates the abdomen from the chest (diaphragm). A person can be born with a hiatal hernia (congenital), or it may develop over time. In almost all cases of hiatal hernia, only the top part of the stomach pushes through the diaphragm.  Many people have a hiatal hernia with no symptoms. The larger the hernia, the more likely it is that you will have symptoms. In some cases, a hiatal hernia allows stomach acid to flow back into the tube that carries food from your mouth to your stomach (esophagus). This may cause heartburn symptoms. Severe heartburn symptoms may mean that you have developed a condition called gastroesophageal reflux disease (GERD).  What are the  causes?  This condition is caused by a weakness in the opening (hiatus) where the esophagus passes through the diaphragm to attach to the upper part of the stomach. A person may be born with a weakness in the hiatus, or a weakness can develop over time.  What increases the risk?  This condition is more likely to develop in:  Older people. Age is a major risk factor for a hiatal hernia, especially if you are over the age of 50.  Pregnant women.  People who are overweight.  People who have frequent constipation.  What are the signs or symptoms?  Symptoms of this condition usually develop in the form of GERD symptoms. Symptoms include:  Heartburn.  Belching.  Indigestion.  Trouble swallowing.  Coughing or wheezing.  Sore throat.  Hoarseness.  Chest pain.  Nausea and vomiting.  How is this diagnosed?  This condition may be diagnosed during testing for GERD. Tests that may be done include:  X-rays of your stomach or chest.  An upper gastrointestinal (GI) series. This is an X-ray exam of your GI tract that is taken after you swallow a chalky liquid that shows up clearly on the X-ray.  Endoscopy. This is a procedure to look into your stomach using a thin, flexible tube that has a tiny camera and light on the end of it.  How is this treated?  This condition may be treated by:  Dietary and lifestyle changes to help reduce GERD symptoms.  Medicines. These may include:  Over-the-counter antacids.  Medicines that make your stomach empty more quickly.  Medicines that block the production of stomach acid (H2 blockers).  Stronger medicines to reduce stomach acid (proton pump inhibitors).  Surgery to repair the hernia, if other treatments are not helping.  If you have no symptoms, you may not need treatment.  Follow these instructions at home:  Lifestyle and activity  Do not use any products that contain nicotine or tobacco, such as cigarettes and e-cigarettes. If you need help quitting, ask your health care provider.  Try to  achieve and maintain a healthy body weight.  Avoid putting pressure on your abdomen. Anything that puts pressure on your abdomen increases the amount of acid that may be pushed up into your esophagus.  Avoid bending over, especially after eating.  Raise the head of your bed by putting blocks under the legs. This keeps your head and esophagus higher than your stomach.  Do not wear tight clothing around your chest or stomach.  Try not to strain when having a bowel movement, when urinating, or when lifting heavy objects.  Eating and drinking  Avoid foods that can worsen GERD symptoms. These may include:  Fatty foods, like fried foods.  Citrus fruits, like oranges or lemon.  Other foods and drinks that contain acid, like orange juice or tomatoes.  Spicy food.  Chocolate.  Eat frequent small meals instead of three large meals a day. This helps prevent your stomach from getting too full.  Eat slowly.  Do not lie down right after eating.  Do not eat 1-2 hours before bed.  Do not drink beverages with caffeine. These include cola, coffee, cocoa, and tea.  Do not drink alcohol.  General instructions  Take over-the-counter and prescription medicines only as told by your health care provider.  Keep all follow-up visits as told by your health care provider. This is important.  Contact a health care provider if:  Your symptoms are not controlled with medicines or lifestyle changes.  You are having trouble swallowing.  You have coughing or wheezing that will not go away.  Get help right away if:  Your pain is getting worse.  Your pain spreads to your arms, neck, jaw, teeth, or back.  You have shortness of breath.  You sweat for no reason.  You feel sick to your stomach (nauseous) or you vomit.  You vomit blood.  You have bright red blood in your stools.  You have black, tarry stools.  This information is not intended to replace advice given to you by your health care provider. Make sure you discuss any questions you have with your  health care provider.  Document Released: 03/09/2005 Document Revised: 11/30/2018 Document Reviewed: 07/23/2018  Elsevier Patient Education © 2020 [employer]

## 2023-03-27 NOTE — OP REPORT
Operative Report    Date: 3/27/2023    Surgeon: John Ganser M.D.    Assistant: Jose Hamilton PA-C    Anesthesia: Dr. Rodriguez    Pre-operative Diagnosis: Large Paraesophageal Hiatal Hernia    Post-operative Diagnosis: Same     Procedure: Laparoscopic Repair Recurrent Paraesophageal Hiatal Hernia with Mesh,  Revision of Fundoplication    Indications: The patient has a history of hiatal hernia and had had 2 repairs in the past. She has developed recurrent symptoms of dysphagia and regurgitation. Upper GI shows the fundoplication has herniated through the hiatus.  Risks, benefits, and alternatives to repair with mesh and fundoplication were outlined in detail. All questions were answered and wished to proceed.    Findings: A posterior recurrent hernia repaired with Phasix ST mesh support, 270 degree fundoplication redone    Procedure in detail:  The patient was identified and general anesthetic   administered.  The abdomen was prepped and draped in the usual sterile   fashion.  Local anesthesia of 0.5% Marcaine with epinephrine was injected   prior to making skin incision.  Small incision was made to left midline in low   epigastric region and the Veress needle passed.  The abdomen was insufflated   with carbon dioxide without incident and a 5-mm blunt trocar and 5-mm   30-degree scope was inserted. A Ranjith liver retractor was passed through a   small subxiphoid incision and used to elevate the lateral segment of the liver   and this was held with the robotic arm.  Right upper quadrant 5, left upper   quadrant 11, left lateral subcostal 5 mm trocars were placed.  Inspection of   the hiatus showed scarring, which was divided with the harmonic scalpel. The herniated content was reduced and circumferential dissection carried around the esophagus well into the mediastinum to allow the gastroesophageal junction  to be brought well below the hiatus under no tension upwards being careful to   avoid injury to the vagus nerves.  Old sutures and mesh was removed from the cara muscles.    Posterior hiatal hernia repair was carried out with 0 Surgidac using the   EndoStitch device placing 3 sutures horizontal mattress sutures using strips of the mesh as bolsters. The remainder of the 7 X 10 cm Phasix ST mesh was soaked in saline and   notched to accommodate the esophagus and inserted in the abdomen and   placed posteriorly over the crural repair.    The fundoplication was intact. On the right and left posterolateral aspects it was secured   down to the hiatus, mesh and esophagus with the EndoStitch device.  The 270-degree fundoplication was then resutured to the esophagus incorporating the mesh and hiatus with the upper suture. The wrap maintained good orientation with no torsion or tension upwards. A suture was placed between the undersurface of the wrap and esophagus laterally on the left. A posterior suture was placed securing the inferior border of the mesh to the crural repair and incorporating the posterior aspect of the wrap. The abdomen was irrigated and hemostasis was assured.  Liver retractor and trocars were withdrawn, the abdomen deflated, and incision was closed with Vicryl.  Sterile dressings were applied.    The patient returned to recovery room in stable condition.    The indications for a surgical assistant in this surgery were indicated due to complexity of the procedure. Their role included aiding in incision, retraction, holding devices including camera for laparoscopic procedure, and closure of the wound.      Sponge and needle counts were correct at the end of the case.     John Ganser, MD, [facility]  [phone]

## 2023-03-27 NOTE — ANESTHESIA PREPROCEDURE EVALUATION
Case: [id number] Date/Time: 03/27/23 1145    Procedure: LAPAROSCOPIC REPAIR RECURRENT HIATAL HERNIA, REVISION FUNDOPLICATION WITH MESH    Pre-op diagnosis: HIATAL HERNIA    Location: [facility] OR 11 / SURGERY [facility]    Surgeons: John H Ganser, M.D.        76yo F w/ PONV, CVA (occassional speech problem per pt), s/p cardiac ablation several years ago, GERD, s/p Nissen on 7/2019, s/p R TKA on 10/2022, here for revision fundoplication. NPO. Pt had normal cardiac PET on 1/2022. Last dose of aspirin yesterday. METS >4.    Zio patch (12/2022): SR w/ frequent PAC and occ PVC  Relevant Problems   NEURO   (positive) TIA (transient ischemic attack)      CARDIAC   (positive) AIVR (accelerated idioventricular rhythm) (HCC) on Zio   (positive) VPC's (ventricular premature complexes)   (positive) Ventricular bigeminy      GI   (positive) Gastroesophageal reflux disease   (positive) Hiatal hernia      ENDO   (positive) Hypothyroidism   (positive) Postoperative hypothyroidism      Other   (positive) Arthritis of left knee       Physical Exam    Airway   Mallampati: III  TM distance: >3 FB  Neck ROM: full       Cardiovascular - normal exam  Rhythm: regular  Rate: normal  (-) murmur     Dental - normal exam           Pulmonary - normal exam  Breath sounds clear to auscultation     Abdominal    Neurological - normal exam                 Anesthesia Plan    ASA 3   ASA physical status 3 criteria: CVA or TIA - history (> 3 months)    Plan - general       Airway plan will be ETT          Induction: intravenous    Postoperative Plan: Postoperative administration of opioids is intended.    Pertinent diagnostic labs and testing reviewed    Informed Consent:    Anesthetic plan and risks discussed with patient.    Use of blood products discussed with: patient whom consented to blood products.

## 2023-03-28 ENCOUNTER — PHARMACY VISIT (OUTPATIENT)
Dept: PHARMACY | Facility: MEDICAL CENTER | Age: 78
End: 2023-03-28
Payer: COMMERCIAL

## 2023-03-28 VITALS
DIASTOLIC BLOOD PRESSURE: 64 MMHG | BODY MASS INDEX: 25.63 KG/M2 | WEIGHT: 173.06 LBS | RESPIRATION RATE: 17 BRPM | OXYGEN SATURATION: 97 % | SYSTOLIC BLOOD PRESSURE: 118 MMHG | HEART RATE: 54 BPM | TEMPERATURE: 97.2 F | HEIGHT: 69 IN

## 2023-03-28 PROCEDURE — 96372 THER/PROPH/DIAG INJ SC/IM: CPT

## 2023-03-28 PROCEDURE — G0378 HOSPITAL OBSERVATION PER HR: HCPCS

## 2023-03-28 PROCEDURE — A9270 NON-COVERED ITEM OR SERVICE: HCPCS | Performed by: PHYSICIAN ASSISTANT

## 2023-03-28 PROCEDURE — 700111 HCHG RX REV CODE 636 W/ 250 OVERRIDE (IP): Performed by: PHYSICIAN ASSISTANT

## 2023-03-28 PROCEDURE — 96376 TX/PRO/DX INJ SAME DRUG ADON: CPT

## 2023-03-28 PROCEDURE — 700102 HCHG RX REV CODE 250 W/ 637 OVERRIDE(OP): Performed by: PHYSICIAN ASSISTANT

## 2023-03-28 RX ADMIN — LIOTHYRONINE SODIUM 5 MCG: 5 TABLET ORAL at 05:36

## 2023-03-28 RX ADMIN — ENOXAPARIN SODIUM 40 MG: 100 INJECTION SUBCUTANEOUS at 10:32

## 2023-03-28 RX ADMIN — FAMOTIDINE 20 MG: 10 INJECTION INTRAVENOUS at 05:36

## 2023-03-28 RX ADMIN — LEVOTHYROXINE SODIUM 75 MCG: 0.07 TABLET ORAL at 05:36

## 2023-03-28 RX ADMIN — KETOROLAC TROMETHAMINE 15 MG: 30 INJECTION, SOLUTION INTRAMUSCULAR at 05:36

## 2023-03-28 RX ADMIN — HYDROCODONE BITARTRATE AND ACETAMINOPHEN 7.5 MG: 7.5; 325 SOLUTION ORAL at 13:16

## 2023-03-28 ASSESSMENT — PAIN DESCRIPTION - PAIN TYPE
TYPE: ACUTE PAIN

## 2023-03-28 ASSESSMENT — ENCOUNTER SYMPTOMS
CHILLS: 0
VOMITING: 0
FEVER: 0
ABDOMINAL PAIN: 1
NAUSEA: 0
SHORTNESS OF BREATH: 0

## 2023-03-28 NOTE — PROGRESS NOTES
Report received from RN, assumed care at 0645  Pt is A0X4, and responds appropriately   Pt declines any SOB, chest pain, new onset of numbness/ tingling  Pt rates pain at 2/10, on a scale of 1-10, pt medicated per MAR  Pt is voiding adequatly and without hesitancy  Pt is burping. No flatus at this time. + bowel sounds  Pt ambulates with a standby assist   Pt is tolerating a diet, pt denies any nausea/vomiting  Lap sites on abdomen covered with gauze and tegaderm. No drainage visible.   Plan of care discussed, all questions answered. Explained importance of calling before getting OOB and pt verbalizes understanding. Explained importance of oral care. Call light is within reach, treaded slipper socks on, bed in lowest/ locked position, hourly rounding in place, all needs met at this time

## 2023-03-28 NOTE — PROGRESS NOTES
Report received from Kandi GONZALEZ, assumed care at 1900  A0x4  Pt declines any SOB on 1L, chest pain, new onset of numbness/tingling  Pt rates pain at 5/10, on a scale of 1-10, pt medicated per MAR  + voiding   Pt has + flatus, + bowel sounds, BM PTA  Pt ambulates with a standby assist  Pt is tolerating a clear liquid diet, pt denies any nausea/vomiting  5 lap sites with gauze and tegaderm  Plan of care discussed, all questions answered.Call light is within reach, treaded slipper socks on, bed in lowest/locked position, hourly rounding in place, all needs met at this time.

## 2023-03-28 NOTE — PROGRESS NOTES
Surgical Progress Note    Author: JOSIE Quiñones Date & Time created: 3/28/2023   9:56 AM     Interval Events:  S/p  Laparoscopic Repair Recurrent Paraesophageal Hiatal Hernia with Mesh,  Revision of Fundoplication by Dr. Ganser -POD#1, doing well; tolerating clears  well without dysphagia or N/V; ambulatory, up to bathroom; using IS; pain controlled; wants to go home     Review of Systems   Constitutional:  Negative for chills and fever.   Respiratory:  Negative for shortness of breath.    Gastrointestinal:  Positive for abdominal pain. Negative for nausea and vomiting.   Skin:  Negative for itching and rash.   Hemodynamics:  Temp (24hrs), Av.2 °C (97.1 °F), Min:35.8 °C (96.5 °F), Max:37.2 °C (99 °F)  Temperature: 36.2 °C (97.2 °F)  Pulse  Av.6  Min: 53  Max: 86   Blood Pressure : 118/64     Respiratory:    Respiration: 17, Pulse Oximetry: 97 %        RUL Breath Sounds: Clear, RML Breath Sounds: Clear, RLL Breath Sounds: Clear, VINEET Breath Sounds: Clear, LLL Breath Sounds: Clear  Neuro:  GCS       Fluids:    Intake/Output Summary (Last 24 hours) at 3/28/2023 0956  Last data filed at 3/28/2023 0230  Gross per 24 hour   Intake 2331.67 ml   Output 5 ml   Net 2326.67 ml     Weight: 78.5 kg (173 lb 1 oz)  Current Diet Order   Procedures    Diet Order Diet: Clear Liquid (no carbonation)     Physical Exam  Vitals and nursing note reviewed.   Constitutional:       General: She is not in acute distress.  Cardiovascular:      Rate and Rhythm: Bradycardia present.   Pulmonary:      Effort: Pulmonary effort is normal. No respiratory distress.   Abdominal:      Palpations: Abdomen is soft.      Comments: Teg's c/d/i   Skin:     General: Skin is warm and dry.   Neurological:      Mental Status: She is alert and oriented to person, place, and time.   Psychiatric:         Mood and Affect: Mood normal.     Labs:  No results found for this or any previous visit (from the past 24 hour(s)).  Medical Decision Making, by  Problem:  Active Hospital Problems    Diagnosis     Paraesophageal hiatal hernia [K44.9]      Plan:  1. Clears without carbonation today. Please ensure patient able to tolerate several ounces (small sips at a time) of water/clears without dysphagia or vomiting/regurgitation before sending home.  2. Advance diet as tolerated to full liquids tomorrow. Follow diet progression handout given at preop appt.  3. Up ad santos.  4. Ok to Shower over Tegaderms ; remove Tegaderms in four days.  5. No baths or soaks x 14 days.  6. No driving x 4-5 days.  7. No lifting > 15 lbs until x 4 weeks.  8. Stop PPI at home.  9. All home med's larger in size than eraser head should be crushed or changed to liquid form x 1-2 weeks, while on liquid diet.  10. Hold MVI/supplements until after postop check.  11. Follow-up with Dr. Ganser ~ 1-2 weeks.    CALL IF YOU:     (1) fevers greater than 101.0 degrees F;  (2) Unusual chest or leg pain, redness or swelling behind the knee or in the calf muscle;  (3) Drainage or fluid from incision that may be foul smelling, increased tenderness or soreness at the wound or the wound edges are no longer together, redness or swelling at the incision site.    (4) Please do not hesitate to call with any other questions. ([phone])     Quality Measures:  Quality-Core Measures   Reviewed items::  Medications reviewed  Kapadia catheter::  No Kapadia  DVT prophylaxis pharmacological::  Enoxaparin (Lovenox)  DVT prophylaxis - mechanical:  SCDs  Ulcer Prophylaxis::  Yes    Discussed patient condition with RN, Patient, and Dr. Ganser

## 2023-03-28 NOTE — CARE PLAN
Problem: Pain - Standard  Goal: Alleviation of pain or a reduction in pain to the patient’s comfort goal  Outcome: Progressing     Problem: Knowledge Deficit - Standard  Goal: Patient and family/care givers will demonstrate understanding of plan of care, disease process/condition, diagnostic tests and medications  Outcome: Progressing   The patient is Stable - Low risk of patient condition declining or worsening    Shift Goals  Clinical Goals: pain control, ambulate  Patient Goals: pain control, rest    Progress made toward(s) clinical / shift goals:  pts pain managed with prn pain medication.  POC discussed with pt, pt verbalized understanding of plan.

## 2023-03-28 NOTE — ANESTHESIA POSTPROCEDURE EVALUATION
Patient: Felipa Stuart    Procedure Summary     Date: 03/27/23 Room / Location: [facility] 11 / SURGERY [facility]    Anesthesia Start: 1135 Anesthesia Stop: 1327    Procedure: LAPAROSCOPIC REPAIR RECURRENT HIATAL HERNIA, REVISION FUNDOPLICATION WITH MESH (Abdomen) Diagnosis: (HIATAL HERNIA)    Surgeons: John H Ganser, M.D. Responsible Provider: Marco Rodriguez M.D.    Anesthesia Type: general ASA Status: 3          Final Anesthesia Type: general  Last vitals  BP   Blood Pressure : 124/65    Temp   36.2 °C (97.1 °F)    Pulse   (!) 57   Resp   18    SpO2   99 %      Anesthesia Post Evaluation    Patient location during evaluation: PACU  Patient participation: complete - patient participated  Level of consciousness: awake and alert    Airway patency: patent  Anesthetic complications: no  Cardiovascular status: hemodynamically stable  Respiratory status: acceptable  Hydration status: euvolemic    PONV: none          No notable events documented.     Nurse Pain Score: 7 (NPRS)

## 2023-03-28 NOTE — CARE PLAN
The patient is Stable - Low risk of patient condition declining or worsening    Shift Goals  Clinical Goals: IS use, mobilize, tolerate liquids  Patient Goals: talk to provider    Progress made toward(s) clinical / shift goals:  Patient using IS vigorously, walking in halls with nursing staff and tolerating clear liquid diet with mild burping    Patient is not progressing towards the following goals:

## 2023-04-04 ENCOUNTER — HOSPITAL ENCOUNTER (INPATIENT)
Facility: MEDICAL CENTER | Age: 78
LOS: 4 days | DRG: 394 | End: 2023-04-08
Attending: STUDENT IN AN ORGANIZED HEALTH CARE EDUCATION/TRAINING PROGRAM | Admitting: STUDENT IN AN ORGANIZED HEALTH CARE EDUCATION/TRAINING PROGRAM
Payer: MEDICARE

## 2023-04-04 DIAGNOSIS — K31.1 GASTRIC OUTLET OBSTRUCTION: ICD-10-CM

## 2023-04-04 DIAGNOSIS — K44.9 PARAESOPHAGEAL HIATAL HERNIA: ICD-10-CM

## 2023-04-04 PROBLEM — E86.0 DEHYDRATION: Status: ACTIVE | Noted: 2023-04-04

## 2023-04-04 PROCEDURE — 770001 HCHG ROOM/CARE - MED/SURG/GYN PRIV*

## 2023-04-04 PROCEDURE — 99223 1ST HOSP IP/OBS HIGH 75: CPT | Mod: AI | Performed by: STUDENT IN AN ORGANIZED HEALTH CARE EDUCATION/TRAINING PROGRAM

## 2023-04-04 PROCEDURE — 700111 HCHG RX REV CODE 636 W/ 250 OVERRIDE (IP): Performed by: STUDENT IN AN ORGANIZED HEALTH CARE EDUCATION/TRAINING PROGRAM

## 2023-04-04 PROCEDURE — 700105 HCHG RX REV CODE 258: Performed by: STUDENT IN AN ORGANIZED HEALTH CARE EDUCATION/TRAINING PROGRAM

## 2023-04-04 RX ORDER — POLYETHYLENE GLYCOL 3350 17 G/17G
1 POWDER, FOR SOLUTION ORAL
Status: DISCONTINUED | OUTPATIENT
Start: 2023-04-04 | End: 2023-04-08 | Stop reason: HOSPADM

## 2023-04-04 RX ORDER — ACETAMINOPHEN 325 MG/1
650 TABLET ORAL EVERY 6 HOURS PRN
Status: DISCONTINUED | OUTPATIENT
Start: 2023-04-04 | End: 2023-04-08 | Stop reason: HOSPADM

## 2023-04-04 RX ORDER — ONDANSETRON 4 MG/1
4 TABLET, ORALLY DISINTEGRATING ORAL EVERY 4 HOURS PRN
Status: DISCONTINUED | OUTPATIENT
Start: 2023-04-04 | End: 2023-04-08 | Stop reason: HOSPADM

## 2023-04-04 RX ORDER — ONDANSETRON 2 MG/ML
4 INJECTION INTRAMUSCULAR; INTRAVENOUS EVERY 4 HOURS PRN
Status: DISCONTINUED | OUTPATIENT
Start: 2023-04-04 | End: 2023-04-08 | Stop reason: HOSPADM

## 2023-04-04 RX ORDER — METOCLOPRAMIDE HYDROCHLORIDE 5 MG/ML
10 INJECTION INTRAMUSCULAR; INTRAVENOUS EVERY 6 HOURS
Status: COMPLETED | OUTPATIENT
Start: 2023-04-05 | End: 2023-04-05

## 2023-04-04 RX ORDER — HYDROMORPHONE HYDROCHLORIDE 1 MG/ML
0.5 INJECTION, SOLUTION INTRAMUSCULAR; INTRAVENOUS; SUBCUTANEOUS
Status: DISCONTINUED | OUTPATIENT
Start: 2023-04-04 | End: 2023-04-08 | Stop reason: HOSPADM

## 2023-04-04 RX ORDER — SODIUM CHLORIDE 9 MG/ML
INJECTION, SOLUTION INTRAVENOUS CONTINUOUS
Status: DISCONTINUED | OUTPATIENT
Start: 2023-04-04 | End: 2023-04-05

## 2023-04-04 RX ORDER — OXYCODONE HYDROCHLORIDE 5 MG/1
5 TABLET ORAL
Status: DISCONTINUED | OUTPATIENT
Start: 2023-04-04 | End: 2023-04-08 | Stop reason: HOSPADM

## 2023-04-04 RX ORDER — AMOXICILLIN 250 MG
2 CAPSULE ORAL 2 TIMES DAILY
Status: DISCONTINUED | OUTPATIENT
Start: 2023-04-04 | End: 2023-04-08 | Stop reason: HOSPADM

## 2023-04-04 RX ORDER — OXYCODONE HYDROCHLORIDE 10 MG/1
10 TABLET ORAL
Status: DISCONTINUED | OUTPATIENT
Start: 2023-04-04 | End: 2023-04-08 | Stop reason: HOSPADM

## 2023-04-04 RX ORDER — LABETALOL HYDROCHLORIDE 5 MG/ML
10 INJECTION, SOLUTION INTRAVENOUS EVERY 4 HOURS PRN
Status: DISCONTINUED | OUTPATIENT
Start: 2023-04-04 | End: 2023-04-08 | Stop reason: HOSPADM

## 2023-04-04 RX ORDER — BISACODYL 10 MG
10 SUPPOSITORY, RECTAL RECTAL
Status: DISCONTINUED | OUTPATIENT
Start: 2023-04-04 | End: 2023-04-08 | Stop reason: HOSPADM

## 2023-04-04 RX ADMIN — METOCLOPRAMIDE 10 MG: 5 INJECTION, SOLUTION INTRAMUSCULAR; INTRAVENOUS at 23:16

## 2023-04-04 RX ADMIN — FAMOTIDINE 20 MG: 10 INJECTION, SOLUTION INTRAVENOUS at 22:56

## 2023-04-04 RX ADMIN — SODIUM CHLORIDE: 9 INJECTION, SOLUTION INTRAVENOUS at 23:05

## 2023-04-04 ASSESSMENT — FIBROSIS 4 INDEX: FIB4 SCORE: 1.55

## 2023-04-05 ENCOUNTER — APPOINTMENT (OUTPATIENT)
Dept: RADIOLOGY | Facility: MEDICAL CENTER | Age: 78
DRG: 394 | End: 2023-04-05
Attending: PHYSICIAN ASSISTANT
Payer: MEDICARE

## 2023-04-05 PROBLEM — E87.20 METABOLIC ACIDOSIS: Status: ACTIVE | Noted: 2023-04-05

## 2023-04-05 PROBLEM — N28.9 RENAL INSUFFICIENCY: Status: ACTIVE | Noted: 2023-04-05

## 2023-04-05 PROBLEM — K31.1 GASTRIC OUTLET OBSTRUCTION: Status: ACTIVE | Noted: 2023-04-05

## 2023-04-05 PROBLEM — R11.2 INTRACTABLE NAUSEA AND VOMITING: Status: ACTIVE | Noted: 2023-04-05

## 2023-04-05 PROBLEM — Z71.89 ACP (ADVANCE CARE PLANNING): Status: ACTIVE | Noted: 2023-04-05

## 2023-04-05 PROBLEM — R13.10 DYSPHAGIA: Status: ACTIVE | Noted: 2023-04-05

## 2023-04-05 LAB
ALBUMIN SERPL BCP-MCNC: 3.8 G/DL (ref 3.2–4.9)
ALBUMIN/GLOB SERPL: 1.6 G/DL
ALP SERPL-CCNC: 84 U/L (ref 30–99)
ALT SERPL-CCNC: 10 U/L (ref 2–50)
ANION GAP SERPL CALC-SCNC: 17 MMOL/L (ref 7–16)
AST SERPL-CCNC: 14 U/L (ref 12–45)
BASOPHILS # BLD AUTO: 0.5 % (ref 0–1.8)
BASOPHILS # BLD: 0.06 K/UL (ref 0–0.12)
BILIRUB SERPL-MCNC: 0.6 MG/DL (ref 0.1–1.5)
BUN SERPL-MCNC: 26 MG/DL (ref 8–22)
CALCIUM ALBUM COR SERPL-MCNC: 9 MG/DL (ref 8.5–10.5)
CALCIUM SERPL-MCNC: 8.8 MG/DL (ref 8.5–10.5)
CHLORIDE SERPL-SCNC: 109 MMOL/L (ref 96–112)
CO2 SERPL-SCNC: 16 MMOL/L (ref 20–33)
CREAT SERPL-MCNC: 0.61 MG/DL (ref 0.5–1.4)
EOSINOPHIL # BLD AUTO: 0.07 K/UL (ref 0–0.51)
EOSINOPHIL NFR BLD: 0.6 % (ref 0–6.9)
ERYTHROCYTE [DISTWIDTH] IN BLOOD BY AUTOMATED COUNT: 51.8 FL (ref 35.9–50)
GFR SERPLBLD CREATININE-BSD FMLA CKD-EPI: 91 ML/MIN/1.73 M 2
GLOBULIN SER CALC-MCNC: 2.4 G/DL (ref 1.9–3.5)
GLUCOSE SERPL-MCNC: 80 MG/DL (ref 65–99)
HCT VFR BLD AUTO: 46.6 % (ref 37–47)
HGB BLD-MCNC: 15.4 G/DL (ref 12–16)
IMM GRANULOCYTES # BLD AUTO: 0.05 K/UL (ref 0–0.11)
IMM GRANULOCYTES NFR BLD AUTO: 0.5 % (ref 0–0.9)
LYMPHOCYTES # BLD AUTO: 0.86 K/UL (ref 1–4.8)
LYMPHOCYTES NFR BLD: 7.8 % (ref 22–41)
MAGNESIUM SERPL-MCNC: 1.8 MG/DL (ref 1.5–2.5)
MCH RBC QN AUTO: 28.3 PG (ref 27–33)
MCHC RBC AUTO-ENTMCNC: 33 G/DL (ref 33.6–35)
MCV RBC AUTO: 85.5 FL (ref 81.4–97.8)
MONOCYTES # BLD AUTO: 0.6 K/UL (ref 0–0.85)
MONOCYTES NFR BLD AUTO: 5.5 % (ref 0–13.4)
NEUTROPHILS # BLD AUTO: 9.32 K/UL (ref 2–7.15)
NEUTROPHILS NFR BLD: 85.1 % (ref 44–72)
NRBC # BLD AUTO: 0 K/UL
NRBC BLD-RTO: 0 /100 WBC
PHOSPHATE SERPL-MCNC: 2.9 MG/DL (ref 2.5–4.5)
PLATELET # BLD AUTO: 191 K/UL (ref 164–446)
PMV BLD AUTO: 10.6 FL (ref 9–12.9)
POTASSIUM SERPL-SCNC: 4 MMOL/L (ref 3.6–5.5)
PROT SERPL-MCNC: 6.2 G/DL (ref 6–8.2)
RBC # BLD AUTO: 5.45 M/UL (ref 4.2–5.4)
SODIUM SERPL-SCNC: 142 MMOL/L (ref 135–145)
WBC # BLD AUTO: 11 K/UL (ref 4.8–10.8)

## 2023-04-05 PROCEDURE — 83735 ASSAY OF MAGNESIUM: CPT

## 2023-04-05 PROCEDURE — 700105 HCHG RX REV CODE 258: Performed by: GENERAL PRACTICE

## 2023-04-05 PROCEDURE — 700117 HCHG RX CONTRAST REV CODE 255: Performed by: PHYSICIAN ASSISTANT

## 2023-04-05 PROCEDURE — 85025 COMPLETE CBC W/AUTO DIFF WBC: CPT

## 2023-04-05 PROCEDURE — 74240 X-RAY XM UPR GI TRC 1CNTRST: CPT

## 2023-04-05 PROCEDURE — 80053 COMPREHEN METABOLIC PANEL: CPT

## 2023-04-05 PROCEDURE — 770001 HCHG ROOM/CARE - MED/SURG/GYN PRIV*

## 2023-04-05 PROCEDURE — 36415 COLL VENOUS BLD VENIPUNCTURE: CPT

## 2023-04-05 PROCEDURE — 84100 ASSAY OF PHOSPHORUS: CPT

## 2023-04-05 PROCEDURE — 700111 HCHG RX REV CODE 636 W/ 250 OVERRIDE (IP): Performed by: STUDENT IN AN ORGANIZED HEALTH CARE EDUCATION/TRAINING PROGRAM

## 2023-04-05 PROCEDURE — 99233 SBSQ HOSP IP/OBS HIGH 50: CPT | Performed by: GENERAL PRACTICE

## 2023-04-05 RX ORDER — SODIUM CHLORIDE 9 MG/ML
INJECTION, SOLUTION INTRAVENOUS CONTINUOUS
Status: DISCONTINUED | OUTPATIENT
Start: 2023-04-05 | End: 2023-04-05

## 2023-04-05 RX ORDER — SODIUM CHLORIDE 9 MG/ML
INJECTION, SOLUTION INTRAVENOUS CONTINUOUS
Status: DISCONTINUED | OUTPATIENT
Start: 2023-04-05 | End: 2023-04-06

## 2023-04-05 RX ADMIN — METOCLOPRAMIDE 10 MG: 5 INJECTION, SOLUTION INTRAMUSCULAR; INTRAVENOUS at 06:13

## 2023-04-05 RX ADMIN — FAMOTIDINE 20 MG: 10 INJECTION, SOLUTION INTRAVENOUS at 06:12

## 2023-04-05 RX ADMIN — SODIUM CHLORIDE: 9 INJECTION, SOLUTION INTRAVENOUS at 15:25

## 2023-04-05 RX ADMIN — FAMOTIDINE 20 MG: 10 INJECTION, SOLUTION INTRAVENOUS at 18:26

## 2023-04-05 RX ADMIN — SODIUM CHLORIDE: 9 INJECTION, SOLUTION INTRAVENOUS at 08:03

## 2023-04-05 RX ADMIN — METOCLOPRAMIDE 10 MG: 5 INJECTION, SOLUTION INTRAMUSCULAR; INTRAVENOUS at 12:33

## 2023-04-05 RX ADMIN — IOHEXOL 125 ML: 350 INJECTION, SOLUTION INTRAVENOUS at 14:15

## 2023-04-05 ASSESSMENT — ENCOUNTER SYMPTOMS
SHORTNESS OF BREATH: 0
NAUSEA: 1
BRUISES/BLEEDS EASILY: 0
VOMITING: 1
DIZZINESS: 0
DEPRESSION: 0
WEAKNESS: 1
HEADACHES: 0
COUGH: 0
FEVER: 0
NAUSEA: 0
VOMITING: 0
BLURRED VISION: 0
HEARTBURN: 1
CHILLS: 0
MYALGIAS: 0
DOUBLE VISION: 0
ABDOMINAL PAIN: 0
PALPITATIONS: 0

## 2023-04-05 ASSESSMENT — COGNITIVE AND FUNCTIONAL STATUS - GENERAL
SUGGESTED CMS G CODE MODIFIER DAILY ACTIVITY: CH
SUGGESTED CMS G CODE MODIFIER MOBILITY: CH
MOBILITY SCORE: 24
DAILY ACTIVITIY SCORE: 24

## 2023-04-05 ASSESSMENT — LIFESTYLE VARIABLES
HOW MANY TIMES IN THE PAST YEAR HAVE YOU HAD 5 OR MORE DRINKS IN A DAY: 0
AVERAGE NUMBER OF DAYS PER WEEK YOU HAVE A DRINK CONTAINING ALCOHOL: 0
ALCOHOL_USE: NO
TOTAL SCORE: 0
TOTAL SCORE: 0
EVER HAD A DRINK FIRST THING IN THE MORNING TO STEADY YOUR NERVES TO GET RID OF A HANGOVER: NO
ON A TYPICAL DAY WHEN YOU DRINK ALCOHOL HOW MANY DRINKS DO YOU HAVE: 0
CONSUMPTION TOTAL: NEGATIVE
SUBSTANCE_ABUSE: 0
DOES PATIENT WANT TO STOP DRINKING: NO
HAVE PEOPLE ANNOYED YOU BY CRITICIZING YOUR DRINKING: NO
HAVE YOU EVER FELT YOU SHOULD CUT DOWN ON YOUR DRINKING: NO
EVER FELT BAD OR GUILTY ABOUT YOUR DRINKING: NO
TOTAL SCORE: 0

## 2023-04-05 NOTE — CARE PLAN
The patient is Stable - Low risk of patient condition declining or worsening         Progress made toward(s) clinical / shift goals:    Problem: Knowledge Deficit - Standard  Goal: Patient and family/care givers will demonstrate understanding of plan of care, disease process/condition, diagnostic tests and medications  Description: Target End Date:  1-3 days or as soon as patient condition allows    Document in Patient Education    1.  Patient and family/caregiver oriented to unit, equipment, visitation policy and means for communicating concern  2.  Complete/review Learning Assessment  3.  Assess knowledge level of disease process/condition, treatment plan, diagnostic tests and medications  4.  Explain disease process/condition, treatment plan, diagnostic tests and medications  Outcome: Progressing   Pt remains NPO per orders and understands importance. Plan of care understood by pt for IVF and sx consult.       Patient is not progressing towards the following goals:

## 2023-04-05 NOTE — H&P
Hospital Medicine History & Physical Note    Date of Service  4/4/2023    Primary Care Physician  CHALO Reed    Consultants  None    Code Status  Full Code    Chief Complaint  No chief complaint on file.  Dysphagia    History of Presenting Illness  Felipa Stuart is a 77 y.o. female with history of recent large hiatal hernia repair by Dr. Ganser who presented 4/4/2023 as direct transfer from freestanding ER for evaluation of dysphagia.  Patient reported having nausea vomiting, difficulty swallowing.  She denies pain but felt dehydrated due to unable to take oral diet.  Therefore, went to freestanding ER for evaluation.  Work-up from ER included:    CBC: WBC 8.7, hemoglobin 17.4, platelet 246  CMP: Sodium 140, potassium 4.1, chloride 102, bicarb 19, anion gap 19, glucose 96, BUN 37, creatinine 1.1, calcium 10.2, albumin 3.5, total protein 7.8, total bilirubin 0.9, alkaline phosphatase 110, AST 20, ALT 18  Lipase 42  Lactic acid 1.4    CT chest abdomen pelvis: Mass effect at GE junction from presumed Nissen fundoplication with fluid distending the esophagus to the level of thoracic inlet.  No surrounding fluid collection or free intraperitoneal air.    Patient was seen by me at [facility] arrival, admitted to medicine service.      I discussed the plan of care with patient and bedside RN.    Review of Systems  Review of Systems   Constitutional:  Negative for chills and fever.   HENT:  Negative for hearing loss and tinnitus.    Eyes:  Negative for blurred vision and double vision.   Respiratory:  Negative for cough and shortness of breath.    Cardiovascular:  Negative for chest pain and palpitations.   Gastrointestinal:  Positive for heartburn, nausea and vomiting. Negative for abdominal pain.   Genitourinary:  Negative for dysuria and hematuria.   Musculoskeletal:  Negative for joint pain and myalgias.   Skin:  Negative for itching and rash.   Neurological:  Positive for weakness. Negative for  dizziness and headaches.   Endo/Heme/Allergies:  Negative for environmental allergies. Does not bruise/bleed easily.   Psychiatric/Behavioral:  Negative for depression and substance abuse.    All other systems reviewed and are negative.    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    Surgical History   has a past surgical history that includes thyroid lobectomy (1976); cataract extraction with iol (Bilateral, 2008); other (2013); full thickness block resection (3/18/2014); other abdominal surgery (2013); other abdominal surgery (2/2016); recovery (7/13/2016); [facility] cardiac cath; other (1975); lumpectomy (Right, 1992); pr lap,esophagogast fundoplasty (7/16/2019); and pr total knee arthroplasty (Right, 10/18/2022).     Family History  family history includes Cancer in her mother.   Family history reviewed with patient. There is no family history that is pertinent to the chief complaint.     Social History   reports that she quit smoking about 43 years ago. Her smoking use included cigarettes. She has a 4.00 pack-year smoking history. She has never used smokeless tobacco. She reports that she does not currently use alcohol. She reports that she does not use drugs.    Allergies  No Known Allergies    Medications  Prior to Admission Medications   Prescriptions Last Dose Informant Patient Reported? Taking?   Ascorbic Acid (VITAMIN C PO)  Patient Yes No   Sig: Take 1 Capsule by mouth every day.   DAILY MULTIPLE VITAMINS PO  Patient Yes No   Sig: Take 1 Tablet by mouth every day.   LEVOXYL 75 MCG Tab  Patient Yes No   Sig: Take 75  mcg by mouth every morning on an empty stomach.   acetaminophen (TYLENOL) 650 MG CR tablet  Patient Yes No   Sig: Take 1,300 mg by mouth every 8 hours as needed.   Patient not taking: Reported on 3/13/2023   aspirin EC (ECOTRIN) 81 MG Tablet Delayed Response  Patient No No   Sig: Take 1 Tablet by mouth every day.   atorvastatin (LIPITOR) 20 MG Tab  Patient No No   Sig: Take 1 Tab by mouth every bedtime.   famotidine (PEPCID) 20 MG Tab  Patient Yes No   Sig: Take 20-70 mg by mouth every day.   ferrous sulfate 325 (65 Fe) MG tablet  Patient Yes No   Sig: Take 325 mg by mouth every day.   liothyronine (CYTOMEL) 5 MCG Tab  Patient Yes No   Sig: Take 5 mcg by mouth every day.   omeprazole (PRILOSEC) 20 MG delayed-release capsule  Patient Yes No   Sig: Take 20 mg by mouth every day.   polyethylene glycol/lytes (MIRALAX) 17 g Pack  Patient No No   Sig: Take 1 Packet by mouth every day.   Patient not taking: Reported on 3/13/2023   vitamin D (CHOLECALCIFEROL) 1000 UNIT Tab  Patient Yes No   Sig: Take 2,000 Units by mouth every day.      Facility-Administered Medications: None       Physical Exam  Temp:  [36.4 °C (97.6 °F)] 36.4 °C (97.6 °F)  Pulse:  [56] 56  Resp:  [17] 17  BP: (144)/(77) 144/77  SpO2:  [96 %] 96 %  Blood Pressure : (!) 144/77   Temperature: 36.4 °C (97.6 °F)   Pulse: (!) 56   Respiration: 17   Pulse Oximetry: 96 %       Physical Exam  Vitals and nursing note reviewed.   Constitutional:       General: She is not in acute distress.  HENT:      Head: Normocephalic and atraumatic.      Nose: Nose normal.      Mouth/Throat:      Mouth: Mucous membranes are dry.      Pharynx: Oropharynx is clear.   Eyes:      General: No scleral icterus.     Extraocular Movements: Extraocular movements intact.   Cardiovascular:      Rate and Rhythm: Normal rate and regular rhythm.      Pulses: Normal pulses.      Heart sounds:     No friction rub.   Pulmonary:      Effort: No respiratory distress.      Breath sounds: No wheezing  or rales.   Chest:      Chest wall: No tenderness.   Abdominal:      General: There is distension.      Tenderness: There is no abdominal tenderness. There is no guarding or rebound.   Musculoskeletal:         General: No swelling or tenderness. Normal range of motion.      Cervical back: Neck supple. No tenderness.   Skin:     General: Skin is warm and dry.      Capillary Refill: Capillary refill takes less than 2 seconds.   Neurological:      General: No focal deficit present.      Mental Status: She is alert and oriented to person, place, and time.   Psychiatric:         Mood and Affect: Mood normal.       Laboratory:          No results for input(s): ALTSGPT, ASTSGOT, ALKPHOSPHAT, TBILIRUBIN, DBILIRUBIN, GAMMAGT, AMYLASE, LIPASE, ALB, PREALBUMIN, GLUCOSE in the last 72 hours.      No results for input(s): NTPROBNP in the last 72 hours.      No results for input(s): TROPONINT in the last 72 hours.    Imaging:  No orders to display       no X-Ray or EKG requiring interpretation    Assessment/Plan:  Justification for Admission Status  I anticipate this patient will require at least 2 midnights hospitalization, therefore appropriate for inpatient status.        * Dysphagia  Assessment & Plan  S/p OR by Dr Ganser for repair of large hiatal hernia 3/27/2023    CT CAP 4/4/2023: Mass effect at GE junction from presumed Nissen fundoplication with fluid distending the esophagus to the level of thoracic inlet.  No surrounding fluid collection or free intraperitoneal air.    NPO  Speech eval  [facility] at recommendation of oncall surgery  Consult surgery in AM    Gastric outlet obstruction  Assessment & Plan  As above    Intractable nausea and vomiting  Assessment & Plan  IVF  Supportive antiemetic    Renal insufficiency  Assessment & Plan  IVF    Dehydration- (present on admission)  Assessment & Plan  IVF    Hiatal hernia- (present on admission)  Assessment & Plan  S/p OR by Dr. Ganser  3/27/2023    TIA (transient ischemic attack)- (present on admission)  Assessment & Plan  Home meds when able to tolerate p.o. meds    Hypothyroidism- (present on admission)  Assessment & Plan  Home med when able to tolerate PO med    ACP (advance care planning)  Assessment & Plan  FULL code per pt        VTE prophylaxis: SCDs/TEDs

## 2023-04-05 NOTE — ASSESSMENT & PLAN NOTE
S/p OR by Dr Ganser for repair of large hiatal hernia 3/27/2023    CT CAP 4/4/2023: Mass effect at GE junction from presumed Nissen fundoplication with fluid distending the esophagus to the level of thoracic inlet.  No surrounding fluid collection or free intraperitoneal air.  General surgery consulted    Case discussed with surgery  - no need for surgical intervention.    - Patient on Toradol, Pepcid, tolerating clear liquid diet, will proceed with advancing to full liquid     Pain control with p.o. and IV medications, monitor for any signs of respiratory depression  Serial BMP, mag, Phos  Antiemetics PRN

## 2023-04-05 NOTE — PROGRESS NOTES
Hospital Medicine Daily Progress Note    Date of Service  4/5/2023    Chief Complaint  Felipa Stuart is a 77 y.o. female admitted 4/4/2023 with nausea, abdominal pain    Hospital Course  This is a 77-year-old female with past medical history of TIA, hypothyroidism and recent hiatal hernia surgery on 3/27/2023 Dr. Ganser (2 prior revisions) who was transferred from an outside facility for evaluation of dysphagia.    Patient reports she has been having intractable nausea, vomiting, unable to tolerate oral intake, and dysphagia. CT chest abdomen pelvis: Mass effect at GE junction from presumed Nissen fundoplication with fluid distending the esophagus to the level of thoracic inlet.  No surrounding fluid collection or free intraperitoneal air.  General surgery consulted.     Interval Problem Update  Patient currently without any nausea or vomiting.    Discussed the case with general surgery, upper GI series ordered.    Antiemetics as needed, pain medication oral and IV pain medication, monitor for any signs of respiratory depression.    Monitor electrolytes, serial BMP, mag, Phos.    I have discussed this patient's plan of care and discharge plan at IDT rounds today with Case Management, Nursing, Nursing leadership, and other members of the IDT team.    Consultants/Specialty  general surgery    Code Status  Full Code    Disposition  Patient is not medically cleared for discharge.   Anticipate discharge to to home with close outpatient follow-up.  I have placed the appropriate orders for post-discharge needs.    Review of Systems  Review of Systems   All other systems reviewed and are negative.     Physical Exam  Temp:  [36.1 °C (96.9 °F)-36.4 °C (97.6 °F)] 36.1 °C (96.9 °F)  Pulse:  [56-64] 64  Resp:  [17] 17  BP: (118-144)/(68-87) 123/87  SpO2:  [95 %-96 %] 96 %    Physical Exam  Vitals and nursing note reviewed.   Constitutional:       General: She is not in acute distress.     Appearance: Normal  appearance.   HENT:      Head: Normocephalic and atraumatic.      Mouth/Throat:      Mouth: Mucous membranes are moist.      Pharynx: No oropharyngeal exudate.   Eyes:      Extraocular Movements: Extraocular movements intact.      Pupils: Pupils are equal, round, and reactive to light.   Cardiovascular:      Rate and Rhythm: Normal rate and regular rhythm.      Pulses: Normal pulses.      Heart sounds: No murmur heard.    No friction rub. No gallop.   Pulmonary:      Effort: Pulmonary effort is normal. No respiratory distress.      Breath sounds: No wheezing, rhonchi or rales.   Abdominal:      General: Bowel sounds are normal. There is no distension.      Palpations: Abdomen is soft. There is no mass.      Tenderness: There is no abdominal tenderness.      Comments: Abdominal incisions x5, no evidence of cellulitis or erythema   Musculoskeletal:         General: No swelling or tenderness. Normal range of motion.      Cervical back: Normal range of motion. No rigidity. No muscular tenderness.      Right lower leg: No edema.      Left lower leg: No edema.   Skin:     General: Skin is warm and dry.      Capillary Refill: Capillary refill takes less than 2 seconds.      Findings: No erythema or rash.   Neurological:      General: No focal deficit present.      Mental Status: She is alert and oriented to person, place, and time.      Motor: No weakness.      Gait: Gait normal.       Fluids  No intake or output data in the 24 hours ending 04/05/23 1455    Laboratory  Recent Labs     04/05/23  0238   WBC 11.0*   RBC 5.45*   HEMOGLOBIN 15.4   HEMATOCRIT 46.6   MCV 85.5   MCH 28.3   MCHC 33.0*   RDW 51.8*   PLATELETCT 191   MPV 10.6     Recent Labs     04/05/23  0238   SODIUM 142   POTASSIUM 4.0   CHLORIDE 109   CO2 16*   GLUCOSE 80   BUN 26*   CREATININE 0.61   CALCIUM 8.8                   Imaging  DX-UPPER GI-SERIES WITH KUB   Final Result      1.  Long nondistensible segment below the level of the gastroesophageal  junction in the region of the gastric fundus which could be related to recent surgery and/or edema.   2.  Dilation of the distal esophagus with tertiary motility.   3.  No evidence of gastroesophageal reflux.           Assessment/Plan  * Dysphagia  Assessment & Plan  S/p OR by Dr Ganser for repair of large hiatal hernia 3/27/2023    CT CAP 4/4/2023: Mass effect at GE junction from presumed Nissen fundoplication with fluid distending the esophagus to the level of thoracic inlet.  No surrounding fluid collection or free intraperitoneal air.  General surgery consulted  Upper GI series ordered      Serial BMP, mag, Phos  N.p.o., IV fluids  Antiemetics PRN  Pain control with p.o. and IV medications, monitor for any signs of respiratory depression    Gastric outlet obstruction  Assessment & Plan  As above    Metabolic acidosis  Assessment & Plan  Secondary to intractable nausea and vomiting  IV fluids  Serial BMP, mag, Phos    ACP (advance care planning)  Assessment & Plan  FULL code per pt    Intractable nausea and vomiting  Assessment & Plan  IVF  Supportive antiemetic    Renal insufficiency  Assessment & Plan  Resolved with IV fluids  Serial BMP    Dehydration- (present on admission)  Assessment & Plan  IVF  Monitor for any signs of fluid overload    Hiatal hernia- (present on admission)  Assessment & Plan  S/p OR by Dr. Ganser 3/27/2023    TIA (transient ischemic attack)- (present on admission)  Assessment & Plan  Home meds when able to tolerate p.o. meds    Hypothyroidism- (present on admission)  Assessment & Plan  Home med when able to tolerate PO med         VTE prophylaxis: SCDs/TEDs    I have performed a physical exam and reviewed and updated ROS and Plan today (4/5/2023). In review of yesterday's note (4/4/2023), there are no changes except as documented above.

## 2023-04-05 NOTE — PROGRESS NOTES
call placed to RN regarding holding swallow eval related to current esophageal findings on imaging. Pt pending surgical consult. Pt is NPO currently. Ck status Thurs.

## 2023-04-05 NOTE — HOSPITAL COURSE
This is a 77-year-old female with past medical history of TIA, hypothyroidism and recent hiatal hernia surgery on 3/27/2023 Dr. Ganser (2 prior revisions) who was transferred from an outside facility for evaluation of dysphagia.    Patient reports she has been having intractable nausea, vomiting, unable to tolerate oral intake, and dysphagia. CT chest abdomen pelvis: Mass effect at GE junction from presumed Nissen fundoplication with fluid distending the esophagus to the level of thoracic inlet.  No surrounding fluid collection or free intraperitoneal air.  General surgery consulted, no surgical intervention warranted.  Patient was started on scheduled Toradol and Robaxin with much improvement in her abdominal pain.  She was able to tolerate full liquid diet with no further episodes of nausea, vomiting or abdominal pain.  She will follow-up with Dr. Ganser outpatient.    All electrolyte abnormalities were resolved prior to discharge.

## 2023-04-05 NOTE — PROGRESS NOTES
[facility]OSPITAL[facility] TRIAGE OFFICER DIRECT ADMISSION REPORT  Transferring facility: [facility]  Transferring physician: Dr. Lyn   Transferring facility/physician contact number: See epic   Chief complaint: unable to tolerate PO intake   Pertinent history & patient course: 77-year-old female past medical history of recent hiatal hernia surgery on March 30, 2023.  Patient reports she is unable to tolerate p.o. intake and has been regurgitating food.  CT scan at outside facility today showing fluid-filled esophagus up to the level of thoracic inlet.  No perforation esophageal.  On-call surgery, Dr. Chung, consulted recommended transfer for IV fluids and surgical evaluation.  Patient is hemodynamically stable, saturating 94% on room air respiratory rate 19, afebrile.  Pertinent imaging & lab results: Anion gap 19  Code Status: Verify and continue CODE STATUS  Further work up or recommendations per triage officer prior to transfer: None  Consultants called prior to transfer and pertinent input from consultants: Surgery - Dr Garrett   Patient accepted for transfer: Yes  Consultants to be called upon arrival: Surgery in am   Admission status: Inpatient.   Floor requested: Med/surg   If ICU transfer, name of intensivist case discussed with and pertinent input from critical care: none      Please inform the triage officer upon arrival of the patient to [facility] for assignment of a hospitalist to perform admission.      For any question or concerns regarding the care of this patient, please reach out to the assigned hospitalist.

## 2023-04-05 NOTE — CARE PLAN
The patient is Stable - Low risk of patient condition declining or worsening    Shift Goals  Clinical Goals: comfort  Patient Goals: rest and surgical consult  Family Goals: joseph    Progress made toward(s) clinical / shift goals:  Patient included in and updated on plan of care. Patient demonstrates self care, independently.   Problem: Knowledge Deficit - Standard  Goal: Patient and family/care givers will demonstrate understanding of plan of care, disease process/condition, diagnostic tests and medications  Outcome: Progressing     Problem: Self Care  Goal: Patient will have the ability to perform ADLs independently or with assistance (bathe, groom, dress, toilet and feed)  Outcome: Progressing       Patient is not progressing towards the following goals:

## 2023-04-05 NOTE — PROGRESS NOTES
Received report from off-going nurse.   Assumed pt care at change of shift.   Assessment completed.   Pt is A&Ox 4, vital signs are stable on room air.   Denies pain, no apparent signs of discomfort.   Bed is in low and locked position, call light and belongings in reach, reminded to use call light.  No needs at this time.

## 2023-04-05 NOTE — PROGRESS NOTES
Surgical Consult Note    Author: JOSIE Quiñones Date & Time created: 2023   11:10 AM     Interval Events:  Felipa is well known to us, having undergone recent laparoscopic repair of recurrent hiatal hernia with Dr. Ganser. Pt reports feeling tight and having some dysphagia since returning home after the procedure.She states that over the past two days, with dysphagia limiting her oral intake, she began feeling weak and dehydrated. She presented to ER in [place] and was sent via ambulance to [facility] to be admitted.   Today, she denies any nausea, vomiting or abdominal pain. She feels better after receiving IVF's.    Review of Systems   Constitutional:  Negative for chills and fever.   Respiratory:  Negative for shortness of breath.    Gastrointestinal:  Negative for abdominal pain, nausea and vomiting.   Skin:  Negative for itching and rash.   Hemodynamics:  Temp (24hrs), Av.3 °C (97.3 °F), Min:36.1 °C (96.9 °F), Max:36.4 °C (97.6 °F)  Temperature: 36.1 °C (96.9 °F)  Pulse  Av.2  Min: 53  Max: 86   Blood Pressure : 123/87     Respiratory:    Respiration: 17, Pulse Oximetry: 96 %        RUL Breath Sounds: Clear, RML Breath Sounds: Clear, RLL Breath Sounds: Clear, VINEET Breath Sounds: Clear, LLL Breath Sounds: Clear  Neuro:  GCS       Fluids:  No intake or output data in the 24 hours ending 23 1110  Weight: 72.1 kg (159 lb)  Current Diet Order   Procedures    Diet NPO Restrict to: Sips with Medications (ice chips)     Physical Exam  Constitutional:       General: She is not in acute distress.  Cardiovascular:      Rate and Rhythm: Normal rate.   Pulmonary:      Effort: Pulmonary effort is normal. No respiratory distress.   Abdominal:      Palpations: Abdomen is soft.      Comments: Trocar sites c/d/i   Skin:     General: Skin is warm and dry.   Neurological:      Mental Status: She is alert and oriented to person, place, and time.   Psychiatric:         Mood and Affect: Mood normal.      Labs:  Recent Results (from the past 24 hour(s))   CBC WITH DIFFERENTIAL    Collection Time: 04/05/23  2:38 AM   Result Value Ref Range    WBC 11.0 (H) 4.8 - 10.8 K/uL    RBC 5.45 (H) 4.20 - 5.40 M/uL    Hemoglobin 15.4 12.0 - 16.0 g/dL    Hematocrit 46.6 37.0 - 47.0 %    MCV 85.5 81.4 - 97.8 fL    MCH 28.3 27.0 - 33.0 pg    MCHC 33.0 (L) 33.6 - 35.0 g/dL    RDW 51.8 (H) 35.9 - 50.0 fL    Platelet Count 191 164 - 446 K/uL    MPV 10.6 9.0 - 12.9 fL    Neutrophils-Polys 85.10 (H) 44.00 - 72.00 %    Lymphocytes 7.80 (L) 22.00 - 41.00 %    Monocytes 5.50 0.00 - 13.40 %    Eosinophils 0.60 0.00 - 6.90 %    Basophils 0.50 0.00 - 1.80 %    Immature Granulocytes 0.50 0.00 - 0.90 %    Nucleated RBC 0.00 /100 WBC    Neutrophils (Absolute) 9.32 (H) 2.00 - 7.15 K/uL    Lymphs (Absolute) 0.86 (L) 1.00 - 4.80 K/uL    Monos (Absolute) 0.60 0.00 - 0.85 K/uL    Eos (Absolute) 0.07 0.00 - 0.51 K/uL    Baso (Absolute) 0.06 0.00 - 0.12 K/uL    Immature Granulocytes (abs) 0.05 0.00 - 0.11 K/uL    NRBC (Absolute) 0.00 K/uL   Comp Metabolic Panel    Collection Time: 04/05/23  2:38 AM   Result Value Ref Range    Sodium 142 135 - 145 mmol/L    Potassium 4.0 3.6 - 5.5 mmol/L    Chloride 109 96 - 112 mmol/L    Co2 16 (L) 20 - 33 mmol/L    Anion Gap 17.0 (H) 7.0 - 16.0    Glucose 80 65 - 99 mg/dL    Bun 26 (H) 8 - 22 mg/dL    Creatinine 0.61 0.50 - 1.40 mg/dL    Calcium 8.8 8.5 - 10.5 mg/dL    AST(SGOT) 14 12 - 45 U/L    ALT(SGPT) 10 2 - 50 U/L    Alkaline Phosphatase 84 30 - 99 U/L    Total Bilirubin 0.6 0.1 - 1.5 mg/dL    Albumin 3.8 3.2 - 4.9 g/dL    Total Protein 6.2 6.0 - 8.2 g/dL    Globulin 2.4 1.9 - 3.5 g/dL    A-G Ratio 1.6 g/dL   MAGNESIUM    Collection Time: 04/05/23  2:38 AM   Result Value Ref Range    Magnesium 1.8 1.5 - 2.5 mg/dL   PHOSPHORUS    Collection Time: 04/05/23  2:38 AM   Result Value Ref Range    Phosphorus 2.9 2.5 - 4.5 mg/dL   CORRECTED CALCIUM    Collection Time: 04/05/23  2:38 AM   Result Value Ref Range     Correct Calcium 9.0 8.5 - 10.5 mg/dL   ESTIMATED GFR    Collection Time: 04/05/23  2:38 AM   Result Value Ref Range    GFR (CKD-EPI) 91 >60 mL/min/1.73 m 2     Medical Decision Making, by Problem:  Active Hospital Problems    Diagnosis     TIA (transient ischemic attack) [G45.9]      Priority: Medium    Hiatal hernia [K44.9]      Priority: Low    Hypothyroidism [E03.9]      Priority: Low    Dysphagia [R13.10]     Renal insufficiency [N28.9]     Intractable nausea and vomiting [R11.2]     ACP (advance care planning) [Z71.89]     Gastric outlet obstruction [K31.1]     Metabolic acidosis [E87.20]     Dehydration [E86.0]      Plan:  Recommend UGI to assess for slipped wrap and/or hiatal hernia recurrence.  Will make rec's based on UGI.    Quality Measures:  Quality-Core Measures   Reviewed items::  Labs reviewed and Medications reviewed    Discussed patient condition with Family, RN, Patient, and Dr. Ganser

## 2023-04-05 NOTE — PROGRESS NOTES
4 Eyes Skin Assessment Completed by CARLOS Sánchez and Jignesh RN.    Head WDL  Ears WDL  Nose WDL  Mouth WDL  Neck WDL  Breast/Chest WDL  Shoulder Blades WDL  Spine WDL  (R) Arm/Elbow/Hand WDL  (L) Arm/Elbow/Hand WDL  Abdomen 5 lap sx sites, SADIA, scabbed over.  Groin WDL  Scrotum/Coccyx/Buttocks Blanching  (R) Leg WDL  (L) Leg WDL  (R) Heel/Foot/Toe Blanching  (L) Heel/Foot/Toe Blanching          Devices In Places Blood Pressure Cuff      Interventions In Place N/A    Possible Skin Injury No    Pictures Uploaded Into Epic N/A  Wound Consult Placed N/A  RN Wound Prevention Protocol Ordered No

## 2023-04-06 PROBLEM — E83.39 HYPOPHOSPHATEMIA: Status: ACTIVE | Noted: 2023-04-06

## 2023-04-06 LAB
ANION GAP SERPL CALC-SCNC: 19 MMOL/L (ref 7–16)
BUN SERPL-MCNC: 20 MG/DL (ref 8–22)
CALCIUM SERPL-MCNC: 8.6 MG/DL (ref 8.5–10.5)
CHLORIDE SERPL-SCNC: 111 MMOL/L (ref 96–112)
CO2 SERPL-SCNC: 15 MMOL/L (ref 20–33)
CREAT SERPL-MCNC: 0.59 MG/DL (ref 0.5–1.4)
ERYTHROCYTE [DISTWIDTH] IN BLOOD BY AUTOMATED COUNT: 50.9 FL (ref 35.9–50)
GFR SERPLBLD CREATININE-BSD FMLA CKD-EPI: 92 ML/MIN/1.73 M 2
GLUCOSE SERPL-MCNC: 66 MG/DL (ref 65–99)
HCT VFR BLD AUTO: 43.9 % (ref 37–47)
HGB BLD-MCNC: 14.3 G/DL (ref 12–16)
MAGNESIUM SERPL-MCNC: 1.7 MG/DL (ref 1.5–2.5)
MCH RBC QN AUTO: 27.8 PG (ref 27–33)
MCHC RBC AUTO-ENTMCNC: 32.6 G/DL (ref 33.6–35)
MCV RBC AUTO: 85.4 FL (ref 81.4–97.8)
PHOSPHATE SERPL-MCNC: 2.4 MG/DL (ref 2.5–4.5)
PLATELET # BLD AUTO: 199 K/UL (ref 164–446)
PMV BLD AUTO: 11 FL (ref 9–12.9)
POTASSIUM SERPL-SCNC: 3.8 MMOL/L (ref 3.6–5.5)
RBC # BLD AUTO: 5.14 M/UL (ref 4.2–5.4)
SODIUM SERPL-SCNC: 145 MMOL/L (ref 135–145)
WBC # BLD AUTO: 6.6 K/UL (ref 4.8–10.8)

## 2023-04-06 PROCEDURE — 99233 SBSQ HOSP IP/OBS HIGH 50: CPT | Performed by: GENERAL PRACTICE

## 2023-04-06 PROCEDURE — 700101 HCHG RX REV CODE 250: Performed by: GENERAL PRACTICE

## 2023-04-06 PROCEDURE — A9270 NON-COVERED ITEM OR SERVICE: HCPCS | Performed by: STUDENT IN AN ORGANIZED HEALTH CARE EDUCATION/TRAINING PROGRAM

## 2023-04-06 PROCEDURE — 80048 BASIC METABOLIC PNL TOTAL CA: CPT

## 2023-04-06 PROCEDURE — 36415 COLL VENOUS BLD VENIPUNCTURE: CPT

## 2023-04-06 PROCEDURE — 700102 HCHG RX REV CODE 250 W/ 637 OVERRIDE(OP): Performed by: STUDENT IN AN ORGANIZED HEALTH CARE EDUCATION/TRAINING PROGRAM

## 2023-04-06 PROCEDURE — 770006 HCHG ROOM/CARE - MED/SURG/GYN SEMI*

## 2023-04-06 PROCEDURE — 700111 HCHG RX REV CODE 636 W/ 250 OVERRIDE (IP): Performed by: STUDENT IN AN ORGANIZED HEALTH CARE EDUCATION/TRAINING PROGRAM

## 2023-04-06 PROCEDURE — 85027 COMPLETE CBC AUTOMATED: CPT

## 2023-04-06 PROCEDURE — 700111 HCHG RX REV CODE 636 W/ 250 OVERRIDE (IP): Performed by: PHYSICIAN ASSISTANT

## 2023-04-06 PROCEDURE — 83735 ASSAY OF MAGNESIUM: CPT

## 2023-04-06 PROCEDURE — 84100 ASSAY OF PHOSPHORUS: CPT

## 2023-04-06 PROCEDURE — 700105 HCHG RX REV CODE 258: Performed by: GENERAL PRACTICE

## 2023-04-06 RX ORDER — KETOROLAC TROMETHAMINE 30 MG/ML
15 INJECTION, SOLUTION INTRAMUSCULAR; INTRAVENOUS EVERY 6 HOURS
Status: DISCONTINUED | OUTPATIENT
Start: 2023-04-06 | End: 2023-04-08 | Stop reason: HOSPADM

## 2023-04-06 RX ORDER — LEVOTHYROXINE SODIUM 0.07 MG/1
75 TABLET ORAL
Status: DISCONTINUED | OUTPATIENT
Start: 2023-04-07 | End: 2023-04-08 | Stop reason: HOSPADM

## 2023-04-06 RX ORDER — ATORVASTATIN CALCIUM 20 MG/1
20 TABLET, FILM COATED ORAL
Status: DISCONTINUED | OUTPATIENT
Start: 2023-04-07 | End: 2023-04-08 | Stop reason: HOSPADM

## 2023-04-06 RX ORDER — LIOTHYRONINE SODIUM 5 UG/1
5 TABLET ORAL
Status: DISCONTINUED | OUTPATIENT
Start: 2023-04-07 | End: 2023-04-08 | Stop reason: HOSPADM

## 2023-04-06 RX ADMIN — SENNOSIDES AND DOCUSATE SODIUM 2 TABLET: 50; 8.6 TABLET ORAL at 06:25

## 2023-04-06 RX ADMIN — KETOROLAC TROMETHAMINE 15 MG: 30 INJECTION, SOLUTION INTRAMUSCULAR at 21:32

## 2023-04-06 RX ADMIN — OXYCODONE 5 MG: 5 TABLET ORAL at 00:58

## 2023-04-06 RX ADMIN — POTASSIUM PHOSPHATE, MONOBASIC AND POTASSIUM PHOSPHATE, DIBASIC 30 MMOL: 224; 236 INJECTION, SOLUTION, CONCENTRATE INTRAVENOUS at 08:19

## 2023-04-06 RX ADMIN — SENNOSIDES AND DOCUSATE SODIUM 2 TABLET: 50; 8.6 TABLET ORAL at 17:42

## 2023-04-06 RX ADMIN — FAMOTIDINE 20 MG: 10 INJECTION, SOLUTION INTRAVENOUS at 17:43

## 2023-04-06 RX ADMIN — KETOROLAC TROMETHAMINE 15 MG: 30 INJECTION, SOLUTION INTRAMUSCULAR at 15:59

## 2023-04-06 RX ADMIN — FAMOTIDINE 20 MG: 10 INJECTION, SOLUTION INTRAVENOUS at 06:25

## 2023-04-06 RX ADMIN — SODIUM CHLORIDE: 9 INJECTION, SOLUTION INTRAVENOUS at 06:25

## 2023-04-06 ASSESSMENT — PAIN DESCRIPTION - PAIN TYPE
TYPE: ACUTE PAIN
TYPE: ACUTE PAIN;CHRONIC PAIN

## 2023-04-06 ASSESSMENT — ENCOUNTER SYMPTOMS
SHORTNESS OF BREATH: 0
ROS GI COMMENTS: + DYSPHAGIA
ABDOMINAL PAIN: 0
VOMITING: 0
FEVER: 0
NAUSEA: 0
CHILLS: 0

## 2023-04-06 NOTE — DISCHARGE INSTRUCTIONS
1. Please ensure patient able to tolerate several ounces (small sips at a time) of clears and/or full liquids without dysphagia or vomiting/regurgitation before sending home.  2.Continue full liquids until f/u appt.  3. Up ad santos.  4. Ok to Shower  5. No baths or soaks x 7 days.  6. No driving x 4-5 days.  7. No lifting > 15 lbs until x 4 weeks.  8. Continue PPI/H2 blocker at home, until postop check.  9. All home med's larger in size than eraser head should be crushed or changed to liquid form x 1-2 weeks, while on liquid diet.  10. Hold MVI/supplements until after postop check.      CALL IF YOU:     (1) fevers greater than 101.0 degrees F;    (2) Unusual chest or leg pain, redness or swelling behind the knee or in the calf muscle;  (3) Drainage or fluid from incision that may be foul smelling, increased tenderness or soreness at the wound or the wound edges are no longer together, redness or swelling at the incision site.    (4) Severe or progressively worsening shortness of breath.  (5) Please do not hesitate to call with any other questions. ([phone])

## 2023-04-06 NOTE — THERAPY
Speech Language Therapy Contact Note    Patient Name: Felipa Stuart  Age:  77 y.o., Sex:  female  Medical Record #: [id number]  Today's Date: 4/6/2023 04/06/23 0939   Treatment Variance   Reason For Missed Therapy Medical - Other (Please Comment)  (Swallow evaluation still on hold still pending surgery consult related to current esophageal findings on imaging.  Pt is NPO currently. Ck status FRI.)   Interdisciplinary Plan of Care Collaboration   IDT Collaboration with  Nursing   Collaboration Comments Pt is still pending surgical consult and still currently NPO.  Ck status Friday.   Session Information   Date / Session Number 4/6-Note Only   Priority 4  (Pt pending surgical consult. Pt is NPO currently. Ck status Friday)

## 2023-04-06 NOTE — PROGRESS NOTES
Surgical Progress Note    Author: JOSIE Quiñones Date & Time created: 2023   4:06 PM     Interval Events:  Felipa had UGI yesterday. Images reviewed in collaboration with Dr. Ganser. No evidence of slipped wrap or recurrence of hiatal hernia, but the wrap is tight due to postop edema/inflammation.   Today, Felipa denies any nausea, vomiting or epigastric pain. She is still having dysphagia/liquid food hanging up at the level of the repair (distal retrosternal area).    Review of Systems   Constitutional:  Negative for chills and fever.   Respiratory:  Negative for shortness of breath.    Gastrointestinal:  Negative for abdominal pain, nausea and vomiting.        + dysphagia   Skin:  Negative for itching and rash.   Hemodynamics:  Temp (24hrs), Av.5 °C (97.7 °F), Min:36.1 °C (96.9 °F), Max:37.2 °C (98.9 °F)  Temperature: 36.1 °C (96.9 °F)  Pulse  Av.6  Min: 53  Max: 87   Blood Pressure : 114/59     Respiratory:    Respiration: 16, Pulse Oximetry: 97 %        RUL Breath Sounds: Clear, RML Breath Sounds: Clear, RLL Breath Sounds: Clear, VINEET Breath Sounds: Clear, LLL Breath Sounds: Clear  Neuro:  GCS       Fluids:  No intake or output data in the 24 hours ending 23 1606     Current Diet Order   Procedures    Diet Order Diet: Clear Liquid     Physical Exam  Vitals and nursing note reviewed.   Constitutional:       General: She is not in acute distress.  Cardiovascular:      Rate and Rhythm: Normal rate.   Pulmonary:      Effort: Pulmonary effort is normal. No respiratory distress.   Abdominal:      Palpations: Abdomen is soft.   Skin:     General: Skin is warm and dry.   Neurological:      Mental Status: She is alert and oriented to person, place, and time.   Psychiatric:         Mood and Affect: Mood normal.     Labs:  Recent Results (from the past 24 hour(s))   CBC WITHOUT DIFFERENTIAL    Collection Time: 23  2:04 AM   Result Value Ref Range    WBC 6.6 4.8 - 10.8 K/uL    RBC 5.14 4.20 -  5.40 M/uL    Hemoglobin 14.3 12.0 - 16.0 g/dL    Hematocrit 43.9 37.0 - 47.0 %    MCV 85.4 81.4 - 97.8 fL    MCH 27.8 27.0 - 33.0 pg    MCHC 32.6 (L) 33.6 - 35.0 g/dL    RDW 50.9 (H) 35.9 - 50.0 fL    Platelet Count 199 164 - 446 K/uL    MPV 11.0 9.0 - 12.9 fL   Basic Metabolic Panel    Collection Time: 04/06/23  2:04 AM   Result Value Ref Range    Sodium 145 135 - 145 mmol/L    Potassium 3.8 3.6 - 5.5 mmol/L    Chloride 111 96 - 112 mmol/L    Co2 15 (L) 20 - 33 mmol/L    Glucose 66 65 - 99 mg/dL    Bun 20 8 - 22 mg/dL    Creatinine 0.59 0.50 - 1.40 mg/dL    Calcium 8.6 8.5 - 10.5 mg/dL    Anion Gap 19.0 (H) 7.0 - 16.0   MAGNESIUM    Collection Time: 04/06/23  2:04 AM   Result Value Ref Range    Magnesium 1.7 1.5 - 2.5 mg/dL   PHOSPHORUS    Collection Time: 04/06/23  2:04 AM   Result Value Ref Range    Phosphorus 2.4 (L) 2.5 - 4.5 mg/dL   ESTIMATED GFR    Collection Time: 04/06/23  2:04 AM   Result Value Ref Range    GFR (CKD-EPI) 92 >60 mL/min/1.73 m 2     Medical Decision Making, by Problem:  Active Hospital Problems    Diagnosis     TIA (transient ischemic attack) [G45.9]      Priority: Medium    Hiatal hernia [K44.9]      Priority: Low    Hypothyroidism [E03.9]      Priority: Low    Hypophosphatemia [E83.39]     Dysphagia [R13.10]     Renal insufficiency [N28.9]     Intractable nausea and vomiting [R11.2]     ACP (advance care planning) [Z71.89]     Gastric outlet obstruction [K31.1]     Metabolic acidosis [E87.20]     Dehydration [E86.0]      Plan:  Our recommendation is for IV toradol and methocarbamol for the swelling and diaphragmatic discomfort. No indication for surgical intervention at this time.  Non-carbonated clears, then ok ADAT to full liquids in AM.  Pt counseled re: diet and activity.  Ok to d/c home tomorrow if oral intake adequate and cleared by hospitalist team.  Will sign off on case. Please call [phone] if there are any questions or problems.  F/u with our office as outpatient next  week.    Quality Measures:  Quality-Core Measures    Discussed patient condition with Hospitalist, RN, Patient, and Dr. Ganser

## 2023-04-06 NOTE — CARE PLAN
The patient is Stable - Low risk of patient condition declining or worsening    Shift Goals  Clinical Goals: safety  Patient Goals: rest  Family Goals: joseph    Progress made toward(s) clinical / shift goals:        Problem: Knowledge Deficit - Standard  Goal: Patient and family/care givers will demonstrate understanding of plan of care, disease process/condition, diagnostic tests and medications  Outcome: Progressing     Problem: Psychosocial  Goal: Patient's level of anxiety will decrease  Outcome: Progressing  Goal: Patient's ability to verbalize feelings about condition will improve  Outcome: Progressing  Goal: Patient's ability to re-evaluate and adapt role responsibilities will improve  Outcome: Progressing  Goal: Patient and family will demonstrate ability to cope with life altering diagnosis and/or procedure  Outcome: Progressing  Goal: Spiritual and cultural needs incorporated into hospitalization  Outcome: Progressing     Problem: Communication  Goal: The ability to communicate needs accurately and effectively will improve  Outcome: Progressing     Problem: Discharge Barriers/Planning  Goal: Patient's continuum of care needs are met  Outcome: Progressing     Problem: Hemodynamics  Goal: Patient's hemodynamics, fluid balance and neurologic status will be stable or improve  Outcome: Progressing     Problem: Respiratory  Goal: Patient will achieve/maintain optimum respiratory ventilation and gas exchange  Outcome: Progressing     Problem: Chest Tube Management  Goal: Complications related to chest tube will be avoided or minimized  Outcome: Progressing     Problem: Fluid Volume  Goal: Fluid volume balance will be maintained  Outcome: Progressing     Problem: Mechanical Ventilation  Goal: Safe management of artificial airway and ventilation  Outcome: Progressing  Goal: Successful weaning off mechanical ventilator, spontaneously maintains adequate gas exchange  Outcome: Progressing  Goal: Patient will be able to  express needs and understand communication  Outcome: Progressing     Problem: Dysphagia  Goal: Dysphagia will improve  Outcome: Progressing     Problem: Risk for Aspiration  Goal: Patient's risk for aspiration will be absent or decrease  Outcome: Progressing     Problem: Nutrition  Goal: Patient's nutritional and fluid intake will be adequate or improve  Outcome: Progressing  Goal: Enteral nutrition will be maintained or improve  Outcome: Progressing  Goal: Enteral nutrition will be maintained or improve  Outcome: Progressing     Problem: Urinary Elimination  Goal: Establish and maintain regular urinary output  Outcome: Progressing     Problem: Bowel Elimination  Goal: Establish and maintain regular bowel function  Outcome: Progressing     Problem: Gastrointestinal Irritability  Goal: Nausea and vomiting will be absent or improve  Outcome: Progressing  Goal: Diarrhea will be absent or improved  Outcome: Progressing     Problem: Rectal Tube  Goal: Fecal output will be contained and skin will remain free from irritation  Outcome: Progressing     Problem: Mobility  Goal: Patient's capacity to carry out activities will improve  Outcome: Progressing     Problem: Self Care  Goal: Patient will have the ability to perform ADLs independently or with assistance (bathe, groom, dress, toilet and feed)  Outcome: Progressing     Problem: Infection - Standard  Goal: Patient will remain free from infection  Outcome: Progressing     Problem: Wound/ / Incision Healing  Goal: Patient's wound/surgical incision will decrease in size and heals properly  Outcome: Progressing     Problem: Pain - Standard  Goal: Alleviation of pain or a reduction in pain to the patient’s comfort goal  Outcome: Progressing

## 2023-04-06 NOTE — PROGRESS NOTES
Hospital Medicine Daily Progress Note    Date of Service  4/6/2023    Chief Complaint  Feliap Stuart is a 77 y.o. female admitted 4/4/2023 with nausea, abdominal pain    Hospital Course  This is a 77-year-old female with past medical history of TIA, hypothyroidism and recent hiatal hernia surgery on 3/27/2023 Dr. Ganser (2 prior revisions) who was transferred from an outside facility for evaluation of dysphagia.    Patient reports she has been having intractable nausea, vomiting, unable to tolerate oral intake, and dysphagia. CT chest abdomen pelvis: Mass effect at GE junction from presumed Nissen fundoplication with fluid distending the esophagus to the level of thoracic inlet.  No surrounding fluid collection or free intraperitoneal air.  General surgery consulted.     Interval Problem Update  Labs significant for hypophosphatemia, IV phosphorus ordered, serial BMP, mag, Phos.    Case discussed with surgery, no need for surgical intervention.  Patient on Toradol, Pepcid, initiating clear liquid diet, if tolerating, tomorrow can advance to full liquid diet.     Antiemetics as needed, pain medication oral and IV pain medication, monitor for any signs of respiratory depression.    We will continue to monitor electrolytes, serial BMP, mag, Phos.    I have discussed this patient's plan of care and discharge plan at IDT rounds today with Case Management, Nursing, Nursing leadership, and other members of the IDT team.    Consultants/Specialty  general surgery    Code Status  Full Code    Disposition  Patient is not medically cleared for discharge.   Anticipate discharge to to home with close outpatient follow-up.  I have placed the appropriate orders for post-discharge needs.    Review of Systems  Review of Systems   All other systems reviewed and are negative.     Physical Exam  Temp:  [36.1 °C (96.9 °F)-37.2 °C (98.9 °F)] 36.1 °C (96.9 °F)  Pulse:  [59-87] 59  Resp:  [16-17] 16  BP: (114-128)/(59-65)  114/59  SpO2:  [93 %-97 %] 97 %    Physical Exam  Vitals and nursing note reviewed.   Constitutional:       General: She is not in acute distress.     Appearance: Normal appearance.   HENT:      Head: Normocephalic and atraumatic.      Mouth/Throat:      Mouth: Mucous membranes are moist.      Pharynx: No oropharyngeal exudate.   Eyes:      Extraocular Movements: Extraocular movements intact.      Pupils: Pupils are equal, round, and reactive to light.   Cardiovascular:      Rate and Rhythm: Normal rate and regular rhythm.      Pulses: Normal pulses.      Heart sounds: No murmur heard.    No friction rub. No gallop.   Pulmonary:      Effort: Pulmonary effort is normal. No respiratory distress.      Breath sounds: No wheezing, rhonchi or rales.   Abdominal:      General: Bowel sounds are normal. There is no distension.      Palpations: Abdomen is soft. There is no mass.      Tenderness: There is no abdominal tenderness.      Comments: Abdominal incisions x5, no evidence of cellulitis or erythema   Musculoskeletal:         General: No swelling or tenderness. Normal range of motion.      Cervical back: Normal range of motion. No rigidity. No muscular tenderness.      Right lower leg: No edema.      Left lower leg: No edema.   Skin:     General: Skin is warm and dry.      Capillary Refill: Capillary refill takes less than 2 seconds.      Findings: No erythema or rash.   Neurological:      General: No focal deficit present.      Mental Status: She is alert and oriented to person, place, and time.      Motor: No weakness.      Gait: Gait normal.       Fluids  No intake or output data in the 24 hours ending 04/06/23 1506    Laboratory  Recent Labs     04/05/23 0238 04/06/23  0204   WBC 11.0* 6.6   RBC 5.45* 5.14   HEMOGLOBIN 15.4 14.3   HEMATOCRIT 46.6 43.9   MCV 85.5 85.4   MCH 28.3 27.8   MCHC 33.0* 32.6*   RDW 51.8* 50.9*   PLATELETCT 191 199   MPV 10.6 11.0     Recent Labs     04/05/23  0238 04/06/23  0204   SODIUM  142 145   POTASSIUM 4.0 3.8   CHLORIDE 109 111   CO2 16* 15*   GLUCOSE 80 66   BUN 26* 20   CREATININE 0.61 0.59   CALCIUM 8.8 8.6                   Imaging  DX-UPPER GI-SERIES WITH KUB   Final Result      1.  Long nondistensible segment below the level of the gastroesophageal junction in the region of the gastric fundus which could be related to recent surgery and/or edema.   2.  Dilation of the distal esophagus with tertiary motility.   3.  No evidence of gastroesophageal reflux.           Assessment/Plan  * Dysphagia  Assessment & Plan  S/p OR by Dr Ganser for repair of large hiatal hernia 3/27/2023    CT CAP 4/4/2023: Mass effect at GE junction from presumed Nissen fundoplication with fluid distending the esophagus to the level of thoracic inlet.  No surrounding fluid collection or free intraperitoneal air.  General surgery consulted    Case discussed with surgery  - no need for surgical intervention.    - Patient on Toradol, Pepcid, initiating clear liquid diet, if tolerating, tomorrow can advance to full liquid diet.     Pain control with p.o. and IV medications, monitor for any signs of respiratory depression  Serial BMP, mag, Phos  Antiemetics PRN    Gastric outlet obstruction  Assessment & Plan  As above    Hypophosphatemia  Assessment & Plan  Mild  IV phosphorus ordered  Serial BMP, mag, Phos    Metabolic acidosis  Assessment & Plan  Secondary to intractable nausea and vomiting  IV fluids  Serial BMP, mag, Phos    ACP (advance care planning)  Assessment & Plan  FULL code per pt    Intractable nausea and vomiting  Assessment & Plan  IVF  Supportive antiemetic  Resolved    Renal insufficiency  Assessment & Plan  Resolved with IV fluids  Serial BMP    Dehydration- (present on admission)  Assessment & Plan  IVF  Monitor for any signs of fluid overload  RESOLVED    Hiatal hernia- (present on admission)  Assessment & Plan  S/p OR by Dr. Ganser 3/27/2023    TIA (transient ischemic attack)- (present on  admission)  Assessment & Plan  Resume patient's home medications, as she is now started on the clinic with diet    Hypothyroidism- (present on admission)  Assessment & Plan  Resume patient's home medications, as she is now started on the clinic with diet         VTE prophylaxis: SCDs/TEDs    I have performed a physical exam and reviewed and updated ROS and Plan today (4/6/2023). In review of yesterday's note (4/5/2023), there are no changes except as documented above.

## 2023-04-06 NOTE — CARE PLAN
Problem: Self Care  Goal: Patient will have the ability to perform ADLs independently or with assistance (bathe, groom, dress, toilet and feed)  Outcome: Progressing   The patient is Stable - Low risk of patient condition declining or worsening    Shift Goals  Clinical Goals: safety  Patient Goals: rest  Family Goals: joseph    Progress made toward(s) clinical / shift goals: no acute events over night. Patient resting at this time.     Patient is not progressing towards the following goals:

## 2023-04-07 LAB
ANION GAP SERPL CALC-SCNC: 14 MMOL/L (ref 7–16)
BUN SERPL-MCNC: 16 MG/DL (ref 8–22)
CALCIUM SERPL-MCNC: 8.6 MG/DL (ref 8.5–10.5)
CHLORIDE SERPL-SCNC: 107 MMOL/L (ref 96–112)
CO2 SERPL-SCNC: 16 MMOL/L (ref 20–33)
CREAT SERPL-MCNC: 0.56 MG/DL (ref 0.5–1.4)
GFR SERPLBLD CREATININE-BSD FMLA CKD-EPI: 93 ML/MIN/1.73 M 2
GLUCOSE SERPL-MCNC: 77 MG/DL (ref 65–99)
MAGNESIUM SERPL-MCNC: 1.6 MG/DL (ref 1.5–2.5)
PHOSPHATE SERPL-MCNC: 2.8 MG/DL (ref 2.5–4.5)
POTASSIUM SERPL-SCNC: 3.6 MMOL/L (ref 3.6–5.5)
SODIUM SERPL-SCNC: 137 MMOL/L (ref 135–145)

## 2023-04-07 PROCEDURE — 700111 HCHG RX REV CODE 636 W/ 250 OVERRIDE (IP): Performed by: STUDENT IN AN ORGANIZED HEALTH CARE EDUCATION/TRAINING PROGRAM

## 2023-04-07 PROCEDURE — A9270 NON-COVERED ITEM OR SERVICE: HCPCS | Performed by: STUDENT IN AN ORGANIZED HEALTH CARE EDUCATION/TRAINING PROGRAM

## 2023-04-07 PROCEDURE — 80048 BASIC METABOLIC PNL TOTAL CA: CPT

## 2023-04-07 PROCEDURE — 83735 ASSAY OF MAGNESIUM: CPT

## 2023-04-07 PROCEDURE — A9270 NON-COVERED ITEM OR SERVICE: HCPCS | Performed by: GENERAL PRACTICE

## 2023-04-07 PROCEDURE — RXMED WILLOW AMBULATORY MEDICATION CHARGE: Performed by: GENERAL PRACTICE

## 2023-04-07 PROCEDURE — 99232 SBSQ HOSP IP/OBS MODERATE 35: CPT | Performed by: GENERAL PRACTICE

## 2023-04-07 PROCEDURE — 700102 HCHG RX REV CODE 250 W/ 637 OVERRIDE(OP): Performed by: GENERAL PRACTICE

## 2023-04-07 PROCEDURE — 84100 ASSAY OF PHOSPHORUS: CPT

## 2023-04-07 PROCEDURE — 770006 HCHG ROOM/CARE - MED/SURG/GYN SEMI*

## 2023-04-07 PROCEDURE — 700111 HCHG RX REV CODE 636 W/ 250 OVERRIDE (IP): Performed by: PHYSICIAN ASSISTANT

## 2023-04-07 PROCEDURE — 700102 HCHG RX REV CODE 250 W/ 637 OVERRIDE(OP): Performed by: STUDENT IN AN ORGANIZED HEALTH CARE EDUCATION/TRAINING PROGRAM

## 2023-04-07 RX ORDER — OXYCODONE HYDROCHLORIDE 5 MG/1
5 TABLET ORAL EVERY 8 HOURS PRN
Qty: 9 TABLET | Refills: 0 | Status: SHIPPED | OUTPATIENT
Start: 2023-04-07 | End: 2023-04-11

## 2023-04-07 RX ORDER — FAMOTIDINE 20 MG/1
20 TABLET, FILM COATED ORAL 2 TIMES DAILY
Status: DISCONTINUED | OUTPATIENT
Start: 2023-04-07 | End: 2023-04-08 | Stop reason: HOSPADM

## 2023-04-07 RX ORDER — METHOCARBAMOL 750 MG/1
750 TABLET, FILM COATED ORAL 4 TIMES DAILY PRN
Qty: 28 TABLET | Refills: 0 | Status: SHIPPED | OUTPATIENT
Start: 2023-04-07 | End: 2023-04-15

## 2023-04-07 RX ORDER — NAPROXEN 500 MG/1
500 TABLET ORAL 2 TIMES DAILY WITH MEALS
Qty: 14 TABLET | Refills: 0 | Status: SHIPPED | OUTPATIENT
Start: 2023-04-07 | End: 2023-04-15

## 2023-04-07 RX ADMIN — KETOROLAC TROMETHAMINE 15 MG: 30 INJECTION, SOLUTION INTRAMUSCULAR at 16:59

## 2023-04-07 RX ADMIN — KETOROLAC TROMETHAMINE 15 MG: 30 INJECTION, SOLUTION INTRAMUSCULAR at 20:50

## 2023-04-07 RX ADMIN — LIOTHYRONINE SODIUM 5 MCG: 5 TABLET ORAL at 10:46

## 2023-04-07 RX ADMIN — FAMOTIDINE 20 MG: 10 INJECTION, SOLUTION INTRAVENOUS at 05:37

## 2023-04-07 RX ADMIN — FAMOTIDINE 20 MG: 20 TABLET, FILM COATED ORAL at 17:19

## 2023-04-07 RX ADMIN — OXYCODONE 5 MG: 5 TABLET ORAL at 14:07

## 2023-04-07 RX ADMIN — ASPIRIN 81 MG: 81 TABLET, COATED ORAL at 05:36

## 2023-04-07 RX ADMIN — KETOROLAC TROMETHAMINE 15 MG: 30 INJECTION, SOLUTION INTRAMUSCULAR at 05:37

## 2023-04-07 RX ADMIN — ATORVASTATIN CALCIUM 20 MG: 20 TABLET, FILM COATED ORAL at 20:50

## 2023-04-07 RX ADMIN — LEVOTHYROXINE SODIUM 75 MCG: 0.07 TABLET ORAL at 05:37

## 2023-04-07 RX ADMIN — KETOROLAC TROMETHAMINE 15 MG: 30 INJECTION, SOLUTION INTRAMUSCULAR at 10:46

## 2023-04-07 ASSESSMENT — PAIN DESCRIPTION - PAIN TYPE
TYPE: ACUTE PAIN
TYPE: ACUTE PAIN;CHRONIC PAIN

## 2023-04-07 NOTE — DIETARY
Nutrition Services Brief Update:    Day 3 of admit.  Felipa Stuart is a 77 y.o. female with admitting DX of Dehydration [E86.0]    Current Diet: Full Liquid    Pt was NPO/CLD x 4 days. Per RN, pt advanced to full liquid diet.     Problem: Nutritional:  Goal: Achieve adequate nutritional intake  Description: Patient will consume *** of meals  Outcome: ***     RD following.

## 2023-04-07 NOTE — PROGRESS NOTES
Hospital Medicine Daily Progress Note    Date of Service  4/7/2023    Chief Complaint  Felipa Stuart is a 77 y.o. female admitted 4/4/2023 with nausea, abdominal pain    Hospital Course  This is a 77-year-old female with past medical history of TIA, hypothyroidism and recent hiatal hernia surgery on 3/27/2023 Dr. Ganser (2 prior revisions) who was transferred from an outside facility for evaluation of dysphagia.    Patient reports she has been having intractable nausea, vomiting, unable to tolerate oral intake, and dysphagia. CT chest abdomen pelvis: Mass effect at GE junction from presumed Nissen fundoplication with fluid distending the esophagus to the level of thoracic inlet.  No surrounding fluid collection or free intraperitoneal air.  General surgery consulted.     Interval Problem Update  Labs reviewed, hypophosphatemia and metabolic acidosis resolved.    Patient reports her abdominal pain is improving with Toradol, she is tolerating her clear liquid diet.  We will advance to full liquid diet.  If she is tolerating this she can discharge home tomorrow.    Antiemetics as needed, pain medication oral and IV pain medication, monitor for any signs of respiratory depression.    We will continue to monitor electrolytes, serial BMP, mag, Phos.    I have discussed this patient's plan of care and discharge plan at IDT rounds today with Case Management, Nursing, Nursing leadership, and other members of the IDT team.    Consultants/Specialty  general surgery    Code Status  Full Code    Disposition  Patient is not medically cleared for discharge.   Anticipate discharge to to home with close outpatient follow-up.  I have placed the appropriate orders for post-discharge needs.    Review of Systems  Review of Systems   All other systems reviewed and are negative.     Physical Exam  Temp:  [36.1 °C (96.9 °F)-36.7 °C (98 °F)] 36.4 °C (97.5 °F)  Pulse:  [61-69] 69  Resp:  [16-18] 18  BP: (125-132)/(76-82)  128/81  SpO2:  [96 %-98 %] 98 %    Physical Exam  Vitals and nursing note reviewed.   Constitutional:       General: She is not in acute distress.     Appearance: Normal appearance.   HENT:      Head: Normocephalic and atraumatic.      Mouth/Throat:      Mouth: Mucous membranes are moist.      Pharynx: No oropharyngeal exudate.   Eyes:      Extraocular Movements: Extraocular movements intact.      Pupils: Pupils are equal, round, and reactive to light.   Cardiovascular:      Rate and Rhythm: Normal rate and regular rhythm.      Pulses: Normal pulses.      Heart sounds: No murmur heard.    No friction rub. No gallop.   Pulmonary:      Effort: Pulmonary effort is normal. No respiratory distress.      Breath sounds: No wheezing, rhonchi or rales.   Abdominal:      General: Bowel sounds are normal. There is no distension.      Palpations: Abdomen is soft. There is no mass.      Tenderness: There is no abdominal tenderness.      Comments: Abdominal incisions x5, no evidence of cellulitis or erythema   Musculoskeletal:         General: No swelling or tenderness. Normal range of motion.      Cervical back: Normal range of motion. No rigidity. No muscular tenderness.      Right lower leg: No edema.      Left lower leg: No edema.   Skin:     General: Skin is warm and dry.      Capillary Refill: Capillary refill takes less than 2 seconds.      Findings: No erythema or rash.   Neurological:      General: No focal deficit present.      Mental Status: She is alert and oriented to person, place, and time.      Motor: No weakness.      Gait: Gait normal.       Fluids    Intake/Output Summary (Last 24 hours) at 4/7/2023 1519  Last data filed at 4/7/2023 1425  Gross per 24 hour   Intake 120 ml   Output --   Net 120 ml       Laboratory  Recent Labs     04/05/23  0238 04/06/23  0204   WBC 11.0* 6.6   RBC 5.45* 5.14   HEMOGLOBIN 15.4 14.3   HEMATOCRIT 46.6 43.9   MCV 85.5 85.4   MCH 28.3 27.8   MCHC 33.0* 32.6*   RDW 51.8* 50.9*    PLATELETCT 191 199   MPV 10.6 11.0     Recent Labs     04/05/23  0238 04/06/23  0204 04/07/23  0056   SODIUM 142 145 137   POTASSIUM 4.0 3.8 3.6   CHLORIDE 109 111 107   CO2 16* 15* 16*   GLUCOSE 80 66 77   BUN 26* 20 16   CREATININE 0.61 0.59 0.56   CALCIUM 8.8 8.6 8.6                   Imaging  DX-UPPER GI-SERIES WITH KUB   Final Result      1.  Long nondistensible segment below the level of the gastroesophageal junction in the region of the gastric fundus which could be related to recent surgery and/or edema.   2.  Dilation of the distal esophagus with tertiary motility.   3.  No evidence of gastroesophageal reflux.           Assessment/Plan  * Dysphagia  Assessment & Plan  S/p OR by Dr Ganser for repair of large hiatal hernia 3/27/2023    CT CAP 4/4/2023: Mass effect at GE junction from presumed Nissen fundoplication with fluid distending the esophagus to the level of thoracic inlet.  No surrounding fluid collection or free intraperitoneal air.  General surgery consulted    Case discussed with surgery  - no need for surgical intervention.    - Patient on Toradol, Pepcid, tolerating clear liquid diet, will proceed with advancing to full liquid     Pain control with p.o. and IV medications, monitor for any signs of respiratory depression  Serial BMP, mag, Phos  Antiemetics PRN    Gastric outlet obstruction  Assessment & Plan  As above    Hypophosphatemia  Assessment & Plan  Labs reviewed, resolved    Metabolic acidosis  Assessment & Plan  Secondary to intractable nausea and vomiting  Labs reviewed, resolved    ACP (advance care planning)  Assessment & Plan  FULL code per pt    Intractable nausea and vomiting  Assessment & Plan  IVF  Supportive antiemetic  Resolved    Renal insufficiency  Assessment & Plan  Resolved with IV fluids  Serial BMP    Dehydration- (present on admission)  Assessment & Plan  IVF  Monitor for any signs of fluid overload  RESOLVED    Hiatal hernia- (present on admission)  Assessment &  Plan  S/p OR by Dr. Ganser 3/27/2023    TIA (transient ischemic attack)- (present on admission)  Assessment & Plan  Resume patient's home medications, as she is now started on the clinic with diet    Hypothyroidism- (present on admission)  Assessment & Plan  Resume patient's home medications, as she is now started on the clinic with diet         VTE prophylaxis: SCDs/TEDs    I have performed a physical exam and reviewed and updated ROS and Plan today (4/7/2023). In review of yesterday's note (4/6/2023), there are no changes except as documented above.

## 2023-04-07 NOTE — PROGRESS NOTES
Patient aox4. No visible signs of distress. VSS. Tolerating medications. Tolerating diet without any n/v. Patient reports pain being well controlled at this time. On room air, no SOB. Ambulatory standby assist, steady gait. Call light within reach and able to make all needs be known, calls appropriately.

## 2023-04-07 NOTE — CARE PLAN
The patient is Stable - Low risk of patient condition declining or worsening    Shift Goals  Clinical Goals: Tolerate diet  Patient Goals: Rest, tolerate diet  Family Goals: JEANINE    Progress made toward(s) clinical / shift goals:      Problem: Knowledge Deficit - Standard  Goal: Patient and family/care givers will demonstrate understanding of plan of care, disease process/condition, diagnostic tests and medications  Description: Target End Date:  1-3 days or as soon as patient condition allows    Document in Patient Education    1.  Patient and family/caregiver oriented to unit, equipment, visitation policy and means for communicating concern  2.  Complete/review Learning Assessment  3.  Assess knowledge level of disease process/condition, treatment plan, diagnostic tests and medications  4.  Explain disease process/condition, treatment plan, diagnostic tests and medications  Outcome: Progressing     Problem: Nutrition  Goal: Patient's nutritional and fluid intake will be adequate or improve  Description: Target End Date:  Prior to discharge or change in level of care    Document on I/O flowsheet    1.  Monitor nutritional intake  2.  Monitor weight per provider order  3.  Assess patient's ability to take oral nutrition  4.  Collaborate with Speech Therapy, Dietitian and interdisciplinary team for appropriate feeding and fluid intake  5.  Assist with feeding  Outcome: Progressing

## 2023-04-07 NOTE — PROGRESS NOTES
hold swallow eval currently. Collaboration with RN, pt tolerating clear liquids and not demonstrating swallow difficulty. Per notes, okay to advance to full liquids if pt tolerating r/t GI medical issues.

## 2023-04-08 ENCOUNTER — PHARMACY VISIT (OUTPATIENT)
Dept: PHARMACY | Facility: MEDICAL CENTER | Age: 78
End: 2023-04-08
Payer: COMMERCIAL

## 2023-04-08 VITALS
RESPIRATION RATE: 17 BRPM | SYSTOLIC BLOOD PRESSURE: 115 MMHG | HEART RATE: 61 BPM | TEMPERATURE: 96.8 F | DIASTOLIC BLOOD PRESSURE: 66 MMHG | OXYGEN SATURATION: 95 % | WEIGHT: 159 LBS | BODY MASS INDEX: 23.55 KG/M2 | HEIGHT: 69 IN

## 2023-04-08 LAB
ANION GAP SERPL CALC-SCNC: 14 MMOL/L (ref 7–16)
BUN SERPL-MCNC: 17 MG/DL (ref 8–22)
CALCIUM SERPL-MCNC: 8.7 MG/DL (ref 8.5–10.5)
CHLORIDE SERPL-SCNC: 107 MMOL/L (ref 96–112)
CO2 SERPL-SCNC: 19 MMOL/L (ref 20–33)
CREAT SERPL-MCNC: 0.56 MG/DL (ref 0.5–1.4)
GFR SERPLBLD CREATININE-BSD FMLA CKD-EPI: 93 ML/MIN/1.73 M 2
GLUCOSE SERPL-MCNC: 77 MG/DL (ref 65–99)
MAGNESIUM SERPL-MCNC: 1.6 MG/DL (ref 1.5–2.5)
PHOSPHATE SERPL-MCNC: 2.9 MG/DL (ref 2.5–4.5)
POTASSIUM SERPL-SCNC: 3.5 MMOL/L (ref 3.6–5.5)
SODIUM SERPL-SCNC: 140 MMOL/L (ref 135–145)

## 2023-04-08 PROCEDURE — 83735 ASSAY OF MAGNESIUM: CPT

## 2023-04-08 PROCEDURE — 700102 HCHG RX REV CODE 250 W/ 637 OVERRIDE(OP): Performed by: STUDENT IN AN ORGANIZED HEALTH CARE EDUCATION/TRAINING PROGRAM

## 2023-04-08 PROCEDURE — 700111 HCHG RX REV CODE 636 W/ 250 OVERRIDE (IP): Performed by: PHYSICIAN ASSISTANT

## 2023-04-08 PROCEDURE — 80048 BASIC METABOLIC PNL TOTAL CA: CPT

## 2023-04-08 PROCEDURE — 84100 ASSAY OF PHOSPHORUS: CPT

## 2023-04-08 PROCEDURE — 700102 HCHG RX REV CODE 250 W/ 637 OVERRIDE(OP): Performed by: GENERAL PRACTICE

## 2023-04-08 PROCEDURE — A9270 NON-COVERED ITEM OR SERVICE: HCPCS | Performed by: GENERAL PRACTICE

## 2023-04-08 PROCEDURE — A9270 NON-COVERED ITEM OR SERVICE: HCPCS | Performed by: STUDENT IN AN ORGANIZED HEALTH CARE EDUCATION/TRAINING PROGRAM

## 2023-04-08 PROCEDURE — 99239 HOSP IP/OBS DSCHRG MGMT >30: CPT | Performed by: GENERAL PRACTICE

## 2023-04-08 RX ADMIN — POTASSIUM BICARBONATE 50 MEQ: 978 TABLET, EFFERVESCENT ORAL at 08:27

## 2023-04-08 RX ADMIN — FAMOTIDINE 20 MG: 20 TABLET, FILM COATED ORAL at 04:25

## 2023-04-08 RX ADMIN — ASPIRIN 81 MG: 81 TABLET, COATED ORAL at 04:26

## 2023-04-08 RX ADMIN — LEVOTHYROXINE SODIUM 75 MCG: 0.07 TABLET ORAL at 04:25

## 2023-04-08 RX ADMIN — OXYCODONE 5 MG: 5 TABLET ORAL at 04:26

## 2023-04-08 RX ADMIN — LIOTHYRONINE SODIUM 5 MCG: 5 TABLET ORAL at 04:27

## 2023-04-08 ASSESSMENT — PAIN DESCRIPTION - PAIN TYPE: TYPE: ACUTE PAIN

## 2023-04-08 NOTE — CARE PLAN
The patient is Stable - Low risk of patient condition declining or worsening    Shift Goals  Clinical Goals: pain control  Patient Goals: rest  Family Goals: JEANINE    Progress made toward(s) clinical / shift goals:    Problem: Knowledge Deficit - Standard  Goal: Patient and family/care givers will demonstrate understanding of plan of care, disease process/condition, diagnostic tests and medications  Outcome: Progressing     Problem: Psychosocial  Goal: Patient's level of anxiety will decrease  Outcome: Progressing  Goal: Patient's ability to verbalize feelings about condition will improve  Outcome: Progressing  Goal: Patient's ability to re-evaluate and adapt role responsibilities will improve  Outcome: Progressing  Goal: Patient and family will demonstrate ability to cope with life altering diagnosis and/or procedure  Outcome: Progressing  Goal: Spiritual and cultural needs incorporated into hospitalization  Outcome: Progressing     Problem: Communication  Goal: The ability to communicate needs accurately and effectively will improve  Outcome: Progressing     Problem: Discharge Barriers/Planning  Goal: Patient's continuum of care needs are met  Outcome: Progressing     Problem: Hemodynamics  Goal: Patient's hemodynamics, fluid balance and neurologic status will be stable or improve  Outcome: Progressing     Problem: Respiratory  Goal: Patient will achieve/maintain optimum respiratory ventilation and gas exchange  Outcome: Progressing     Problem: Chest Tube Management  Goal: Complications related to chest tube will be avoided or minimized  Outcome: Progressing     Problem: Fluid Volume  Goal: Fluid volume balance will be maintained  Outcome: Progressing     Problem: Mechanical Ventilation  Goal: Safe management of artificial airway and ventilation  Outcome: Progressing  Goal: Successful weaning off mechanical ventilator, spontaneously maintains adequate gas exchange  Outcome: Progressing  Goal: Patient will be able  to express needs and understand communication  Outcome: Progressing     Problem: Dysphagia  Goal: Dysphagia will improve  Outcome: Progressing     Problem: Risk for Aspiration  Goal: Patient's risk for aspiration will be absent or decrease  Outcome: Progressing     Problem: Nutrition  Goal: Patient's nutritional and fluid intake will be adequate or improve  Outcome: Progressing  Goal: Enteral nutrition will be maintained or improve  Outcome: Progressing  Goal: Enteral nutrition will be maintained or improve  Outcome: Progressing     Problem: Urinary Elimination  Goal: Establish and maintain regular urinary output  Outcome: Progressing     Problem: Bowel Elimination  Goal: Establish and maintain regular bowel function  Outcome: Progressing     Problem: Gastrointestinal Irritability  Goal: Nausea and vomiting will be absent or improve  Outcome: Progressing  Goal: Diarrhea will be absent or improved  Outcome: Progressing     Problem: Rectal Tube  Goal: Fecal output will be contained and skin will remain free from irritation  Outcome: Progressing     Problem: Mobility  Goal: Patient's capacity to carry out activities will improve  Outcome: Progressing     Problem: Self Care  Goal: Patient will have the ability to perform ADLs independently or with assistance (bathe, groom, dress, toilet and feed)  Outcome: Progressing     Problem: Infection - Standard  Goal: Patient will remain free from infection  Outcome: Progressing     Problem: Wound/ / Incision Healing  Goal: Patient's wound/surgical incision will decrease in size and heals properly  Outcome: Progressing     Problem: Pain - Standard  Goal: Alleviation of pain or a reduction in pain to the patient’s comfort goal  Outcome: Progressing

## 2023-04-08 NOTE — PROGRESS NOTES
When RN flushed pt's IV with saline, it was leaking.  IV taken out.  Cath intact.  Pt asked RN to leave IV out since pt is possible discharge today.  Unable to give Toradol.

## 2023-04-08 NOTE — DISCHARGE SUMMARY
Discharge Summary    CHIEF COMPLAINT ON ADMISSION  No chief complaint on file.      Reason for Admission  post op complication     Admission Date  4/4/2023    CODE STATUS  Full    HPI & HOSPITAL COURSE  This is a 77-year-old female with past medical history of TIA, hypothyroidism and recent hiatal hernia surgery on 3/27/2023 Dr. Ganser (2 prior revisions) who was transferred from an outside facility for evaluation of dysphagia.    Patient reports she has been having intractable nausea, vomiting, unable to tolerate oral intake, and dysphagia. CT chest abdomen pelvis: Mass effect at GE junction from presumed Nissen fundoplication with fluid distending the esophagus to the level of thoracic inlet.  No surrounding fluid collection or free intraperitoneal air.  General surgery consulted, no surgical intervention warranted.  Patient was started on scheduled Toradol and Robaxin with much improvement in her abdominal pain.  She was able to tolerate full liquid diet with no further episodes of nausea, vomiting or abdominal pain.  She will follow-up with Dr. Ganser outpatient.    All electrolyte abnormalities were resolved prior to discharge.    Therefore, she is discharged in good and stable condition to home with close outpatient follow-up.    The patient met 2-midnight criteria for an inpatient stay at the time of discharge.    Discharge Date  04/8/2023    FOLLOW UP ITEMS POST DISCHARGE  Primary care physician  General surgery    DISCHARGE DIAGNOSES  Principal Problem:    Dysphagia POA: Unknown  Active Problems:    Gastric outlet obstruction POA: Unknown    Hypothyroidism POA: Yes    TIA (transient ischemic attack) POA: Yes    Hiatal hernia (Chronic) POA: Yes    Dehydration POA: Yes    Renal insufficiency POA: Unknown    Intractable nausea and vomiting POA: Unknown    ACP (advance care planning) POA: Unknown    Metabolic acidosis POA: Unknown    Hypophosphatemia POA: Unknown  Resolved Problems:    * No resolved hospital  problems. *      FOLLOW UP  Future Appointments   Date Time Provider Department Center   6/27/2023 12:45 PM CHALO Curtis [facility] None     JOSIE Quiñones  [address]  [address]  [place] [id number] [phone]    Follow up in 1 week(s)      CHALO Reed  [address]  [address]  [facility] [id number] [phone]    Follow up        MEDICATIONS ON DISCHARGE     Medication List        START taking these medications        Instructions   methocarbamol 750 MG Tabs  Commonly known as: ROBAXIN   Take 1 Tablet by mouth 4 times a day as needed (abdominal pain) for up to 7 days.  Dose: 750 mg     naproxen 500 MG Tabs  Commonly known as: NAPROSYN   Take 1 Tablet by mouth 2 times a day with meals for 7 days.  Dose: 500 mg     oxyCODONE immediate-release 5 MG Tabs  Commonly known as: ROXICODONE   Take 1 Tablet by mouth every 8 hours as needed for Severe Pain for up to 3 days.  Dose: 5 mg            CONTINUE taking these medications        Instructions   aspirin EC 81 MG Tbec  Commonly known as: ECOTRIN   Take 1 Tablet by mouth every day.  Dose: 81 mg     atorvastatin 20 MG Tabs  Commonly known as: LIPITOR   Take 1 Tab by mouth every bedtime.  Dose: 20 mg     DAILY MULTIPLE VITAMINS PO   Take 1 Tablet by mouth every day.  Dose: 1 Tablet     famotidine 20 MG Tabs  Commonly known as: PEPCID   Take 20-70 mg by mouth every day.  Dose: 20-70 mg     ferrous sulfate 325 (65 Fe) MG tablet   Take 325 mg by mouth every day.  Dose: 325 mg     Levoxyl 75 MCG Tabs  Generic drug: levothyroxine   Take 75 mcg by mouth every morning on an empty stomach.  Dose: 75 mcg     liothyronine 5 MCG Tabs  Commonly known as: CYTOMEL   Take 5 mcg by mouth every day.  Dose: 5 mcg     omeprazole 20 MG delayed-release capsule  Commonly known as: PRILOSEC   Take 20 mg by mouth every day.  Dose: 20 mg     VITAMIN C PO   Take 1 Capsule by mouth every day.  Dose: 1 Capsule     vitamin D 1000 Unit (25 mcg) Tabs  Commonly  known as: cholecalciferol   Take 2,000 Units by mouth every day.  Dose: 2,000 Units            STOP taking these medications      acetaminophen 650 MG CR tablet  Commonly known as: TYLENOL     polyethylene glycol/lytes 17 g Pack  Commonly known as: MiraLax              Allergies  No Known Allergies    DIET  No orders of the defined types were placed in this encounter.      ACTIVITY  As tolerated.  Weight bearing as tolerated    CONSULTATIONS  General surgery    PROCEDURES  None    LABORATORY  Lab Results   Component Value Date    SODIUM 140 04/08/2023    POTASSIUM 3.5 (L) 04/08/2023    CHLORIDE 107 04/08/2023    CO2 19 (L) 04/08/2023    GLUCOSE 77 04/08/2023    BUN 17 04/08/2023    CREATININE 0.56 04/08/2023        Lab Results   Component Value Date    WBC 6.6 04/06/2023    HEMOGLOBIN 14.3 04/06/2023    HEMATOCRIT 43.9 04/06/2023    PLATELETCT 199 04/06/2023      DX-UPPER GI-SERIES WITH KUB   Final Result      1.  Long nondistensible segment below the level of the gastroesophageal junction in the region of the gastric fundus which could be related to recent surgery and/or edema.   2.  Dilation of the distal esophagus with tertiary motility.   3.  No evidence of gastroesophageal reflux.         Total time of the discharge process exceeds 45 minutes.

## 2023-04-08 NOTE — PROGRESS NOTES
Pt received sitting up in bed. Pt is ambulatory, steady gait. Patient is currently on full liquid diet and tolerates this without issues. Safety precautions in place. No issues noted at this time.

## 2023-05-18 ENCOUNTER — HOSPITAL ENCOUNTER (OUTPATIENT)
Dept: LAB | Facility: MEDICAL CENTER | Age: 78
End: 2023-05-18
Attending: INTERNAL MEDICINE
Payer: MEDICARE

## 2023-05-18 LAB
25(OH)D3 SERPL-MCNC: 74 NG/ML (ref 30–100)
T3FREE SERPL-MCNC: 2.55 PG/ML (ref 2–4.4)
T4 FREE SERPL-MCNC: 1.05 NG/DL (ref 0.93–1.7)
TSH SERPL DL<=0.005 MIU/L-ACNC: 1.09 UIU/ML (ref 0.38–5.33)
VIT B12 SERPL-MCNC: 485 PG/ML (ref 211–911)

## 2023-05-18 PROCEDURE — 84481 FREE ASSAY (FT-3): CPT

## 2023-05-18 PROCEDURE — 82306 VITAMIN D 25 HYDROXY: CPT

## 2023-05-18 PROCEDURE — 84439 ASSAY OF FREE THYROXINE: CPT

## 2023-05-18 PROCEDURE — 82607 VITAMIN B-12: CPT

## 2023-05-18 PROCEDURE — 36415 COLL VENOUS BLD VENIPUNCTURE: CPT

## 2023-05-18 PROCEDURE — 84443 ASSAY THYROID STIM HORMONE: CPT

## 2023-06-27 ENCOUNTER — APPOINTMENT (OUTPATIENT)
Dept: CARDIOLOGY | Facility: MEDICAL CENTER | Age: 78
End: 2023-06-27
Attending: NURSE PRACTITIONER
Payer: MEDICARE

## 2023-07-06 ENCOUNTER — OFFICE VISIT (OUTPATIENT)
Dept: CARDIOLOGY | Facility: MEDICAL CENTER | Age: 78
End: 2023-07-06
Attending: NURSE PRACTITIONER
Payer: MEDICARE

## 2023-07-06 VITALS
OXYGEN SATURATION: 98 % | WEIGHT: 165 LBS | DIASTOLIC BLOOD PRESSURE: 74 MMHG | BODY MASS INDEX: 24.44 KG/M2 | HEIGHT: 69 IN | SYSTOLIC BLOOD PRESSURE: 118 MMHG | HEART RATE: 59 BPM | RESPIRATION RATE: 16 BRPM

## 2023-07-06 DIAGNOSIS — G45.9 TIA (TRANSIENT ISCHEMIC ATTACK): ICD-10-CM

## 2023-07-06 DIAGNOSIS — I49.8 VENTRICULAR BIGEMINY: ICD-10-CM

## 2023-07-06 DIAGNOSIS — E78.49 OTHER HYPERLIPIDEMIA: ICD-10-CM

## 2023-07-06 DIAGNOSIS — Z79.01 CHRONIC ANTICOAGULATION: Chronic | ICD-10-CM

## 2023-07-06 DIAGNOSIS — Z98.890 S/P ABLATION OF VENTRICULAR ARRHYTHMIA: ICD-10-CM

## 2023-07-06 DIAGNOSIS — I49.3 VPC'S (VENTRICULAR PREMATURE COMPLEXES): ICD-10-CM

## 2023-07-06 DIAGNOSIS — G45.8 OTHER SPECIFIED TRANSIENT CEREBRAL ISCHEMIAS: ICD-10-CM

## 2023-07-06 DIAGNOSIS — F17.200 TOBACCO DEPENDENCE SYNDROME: ICD-10-CM

## 2023-07-06 DIAGNOSIS — Z86.79 S/P ABLATION OF VENTRICULAR ARRHYTHMIA: ICD-10-CM

## 2023-07-06 PROBLEM — M19.90 OSTEOARTHROSIS: Status: ACTIVE | Noted: 2023-02-27

## 2023-07-06 PROBLEM — K22.70 BARRETT'S ESOPHAGUS: Status: ACTIVE | Noted: 2023-02-27

## 2023-07-06 PROBLEM — H26.9 BILATERAL CATARACTS: Status: ACTIVE | Noted: 2023-02-27

## 2023-07-06 PROCEDURE — 3074F SYST BP LT 130 MM HG: CPT | Performed by: NURSE PRACTITIONER

## 2023-07-06 PROCEDURE — 99212 OFFICE O/P EST SF 10 MIN: CPT | Performed by: NURSE PRACTITIONER

## 2023-07-06 PROCEDURE — 99214 OFFICE O/P EST MOD 30 MIN: CPT | Performed by: NURSE PRACTITIONER

## 2023-07-06 PROCEDURE — 3078F DIAST BP <80 MM HG: CPT | Performed by: NURSE PRACTITIONER

## 2023-07-06 RX ORDER — ATORVASTATIN CALCIUM 20 MG/1
20 TABLET, FILM COATED ORAL
Qty: 100 TABLET | Refills: 3 | Status: SHIPPED | OUTPATIENT
Start: 2023-07-06

## 2023-07-06 RX ORDER — ATORVASTATIN CALCIUM 20 MG/1
20 TABLET, FILM COATED ORAL
Qty: 100 TABLET | Refills: 3 | Status: SHIPPED | OUTPATIENT
Start: 2023-07-06 | End: 2023-07-06 | Stop reason: SDUPTHER

## 2023-07-06 ASSESSMENT — ENCOUNTER SYMPTOMS
MYALGIAS: 0
ABDOMINAL PAIN: 0
CLAUDICATION: 0
SHORTNESS OF BREATH: 0
COUGH: 0
ORTHOPNEA: 0
PND: 0
PALPITATIONS: 0
DIZZINESS: 0
FEVER: 0

## 2023-07-06 ASSESSMENT — FIBROSIS 4 INDEX: FIB4 SCORE: 1.76

## 2023-07-06 NOTE — PATIENT INSTRUCTIONS
I have sent atorvastatin into your [facility] mail order pharmacy.    If your palpitations worsen, please let me know and I can send a medication to you to help with this.

## 2023-07-06 NOTE — PROGRESS NOTES
Subjective:   Felipa Stuart is a 78 y.o. female who presents today for review of symptoms.    She is a patient of Dr. Carvajal in our office.    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    She presents today alone. She has improvement in symptoms since our last appointment.    Occasional palpitations once a month for <10 minutes at a time. A new onset tremor in her hands as well.    She has had no episodes of chest pain, dizziness/lightheadedness, orthopnea, or peripheral edema.    Past Medical History:   Diagnosis Date    AIVR (accelerated idioventricular rhythm) (HCC) on Zio     Anesthesia     post op nausea    Arrhythmia     bigeminal PVC's, SVT with questionable Afib    Bradycardia     CATARACT     bianka IOL    Chicken pox     as child     Chronic anticoagulation - concern for afib     Heart burn     Hiatal hernia     Hiatus hernia syndrome     repaired 2/28/2016    HX: benign breast biopsy     Rt breast    Hypothyroidism     Measles     as child     PONV (postoperative nausea and vomiting)     Scarlet fever     as child     Stroke (HCC) 2/28/2016    mild speech delay    TIA (transient ischemic attack) 06/11/2013     Past Surgical History:   Procedure Laterality Date    PB TOTAL KNEE ARTHROPLASTY Right 10/18/2022    Procedure: RIGHT TOTAL KNEE ARTHROPLASTY;  Surgeon: Hemant Vaughan M.D.;  Location: [facility];  Service: Orthopedics    MT LAP,ESOPHAGOGAST FUNDOPLASTY  7/16/2019    Procedure: FUNDOPLICATION, NISSEN, LAPAROSCOPIC- REPAIR PARAESOPHAGEAL HIATAL HERNIA WITH MESH;  Surgeon: John H Ganser, M.D.;  Location: SURGERY [facility];  Service: General    RECOVERY  7/13/2016    Procedure: CATH LAB-PVC ABLATION WAGNER-REGAN -CARTO;  Surgeon: Recoveryonly Surgery;  Location: SURGERY PRE-[facility] PROC UNIT [facility];  Service:     OTHER ABDOMINAL SURGERY  2/2016    hiatal  hernia repair, Dr Darnell    FULL THICKNESS BLOCK RESECTION  3/18/2014    Performed by You Abbott M.D. at [facility]    OTHER  2013    bladder sling     OTHER ABDOMINAL SURGERY  2013    cholecystectomy    CATARACT EXTRACTION WITH IOL Bilateral 2008    LUMPECTOMY Right 1992    right breast    THYROID LOBECTOMY  1976    OTHER  1975    D&C and tubal ligation     ZZZ CARDIAC CATH      ablation     Family History   Problem Relation Age of Onset    Cancer Mother      Social History     Tobacco Use   Smoking Status Former    Packs/day: 0.20    Years: 20.00    Pack years: 4.00    Types: Cigarettes    Quit date: 1980    Years since quittin.5   Smokeless Tobacco Never   Vaping Use    Vaping Use: Never used     No Known Allergies  Outpatient Encounter Medications as of 2023   Medication Sig Dispense Refill    aspirin EC (ECOTRIN) 81 MG Tablet Delayed Response Take 1 Tablet by mouth every day. 90 Tablet 3    liothyronine (CYTOMEL) 5 MCG Tab Take 5 mcg by mouth every day.      LEVOXYL 75 MCG Tab Take 75 mcg by mouth every morning on an empty stomach.      omeprazole (PRILOSEC) 20 MG delayed-release capsule Take 20 mg by mouth every day.      atorvastatin (LIPITOR) 20 MG Tab Take 1 Tab by mouth every bedtime. 90 Tab 3    vitamin D (CHOLECALCIFEROL) 1000 UNIT Tab Take 2,000 Units by mouth every day.      [DISCONTINUED] Ascorbic Acid (VITAMIN C PO) Take 1 Capsule by mouth every day. (Patient not taking: Reported on 2023)      [DISCONTINUED] ferrous sulfate 325 (65 Fe) MG tablet Take 325 mg by mouth every day. (Patient not taking: Reported on 2023)      [DISCONTINUED] famotidine (PEPCID) 20 MG Tab Take 20-70 mg by mouth every day. (Patient not taking: Reported on 2023)      [DISCONTINUED] DAILY MULTIPLE VITAMINS PO Take 1 Tablet by mouth every day. (Patient not taking: Reported on 2023)       No facility-administered encounter medications on file as of 2023.     Review of  "Systems   Constitutional:  Negative for fever and malaise/fatigue.   Respiratory:  Negative for cough and shortness of breath.    Cardiovascular:  Negative for chest pain, palpitations, orthopnea, claudication, leg swelling and PND.   Gastrointestinal:  Negative for abdominal pain.   Musculoskeletal:  Negative for myalgias.   Neurological:  Negative for dizziness.        Objective:   /74 (BP Location: Left arm, Patient Position: Sitting, BP Cuff Size: Adult)   Pulse (!) 59   Resp 16   Ht 1.753 m (5' 9\")   Wt 74.8 kg (165 lb)   SpO2 98%   BMI 24.37 kg/m²     Physical Exam  Vitals and nursing note reviewed.   Constitutional:       General: She is not in acute distress.     Appearance: Normal appearance. She is well-developed and normal weight.   HENT:      Head: Normocephalic and atraumatic.   Neck:      Vascular: No JVD.   Cardiovascular:      Rate and Rhythm: Normal rate and regular rhythm.      Pulses: Normal pulses.      Heart sounds: Normal heart sounds. No murmur heard.  Pulmonary:      Effort: Pulmonary effort is normal. No respiratory distress.      Breath sounds: Normal breath sounds.   Abdominal:      General: Bowel sounds are normal.      Palpations: Abdomen is soft.   Musculoskeletal:         General: Normal range of motion.      Cervical back: Normal range of motion.   Skin:     General: Skin is warm and dry.      Capillary Refill: Capillary refill takes less than 2 seconds.   Neurological:      General: No focal deficit present.      Mental Status: She is alert and oriented to person, place, and time. Mental status is at baseline.   Psychiatric:         Mood and Affect: Mood normal.         Behavior: Behavior normal.         Thought Content: Thought content normal.         Judgment: Judgment normal.         Assessment:     1. Chronic anticoagulation - concern for afib        2. Other hyperlipidemia        3. S/P ablation of ventricular arrhythmia        4. TIA (transient ischemic attack)      "   5. Ventricular bigeminy        6. VPC's (ventricular premature complexes)        7. Tobacco dependence syndrome            Medical Decision Making:  Today's Assessment / Status / Plan:     1. PVC's/PAC's with PVC ablation in '16  -follow  -OK to add low dose bb for further palpitations  -taken off eliquis with spontaneous hemarthrosis    2. HLD with TIA/CVA  -no deficits  -cont asa and statin  -no afib on last heart monitor  -LDL goal <70 with CVA, follow labs    HCC Gap Form    Last edited 07/06/23 13:45 PDT by Alis Luna, A.P.R.N.           Patient is to follow up with Alis MILES in 1 year with annual labs.

## 2023-09-11 ENCOUNTER — DOCUMENTATION (OUTPATIENT)
Dept: HEALTH INFORMATION MANAGEMENT | Facility: OTHER | Age: 78
End: 2023-09-11
Payer: MEDICARE

## 2023-09-14 ENCOUNTER — HOSPITAL ENCOUNTER (OUTPATIENT)
Dept: LAB | Facility: MEDICAL CENTER | Age: 78
End: 2023-09-14
Attending: INTERNAL MEDICINE
Payer: MEDICARE

## 2023-09-14 LAB
T3FREE SERPL-MCNC: 2.77 PG/ML (ref 2–4.4)
T4 FREE SERPL-MCNC: 1.23 NG/DL (ref 0.93–1.7)
TSH SERPL DL<=0.005 MIU/L-ACNC: 0.6 UIU/ML (ref 0.38–5.33)

## 2023-09-14 PROCEDURE — 84481 FREE ASSAY (FT-3): CPT

## 2023-09-14 PROCEDURE — 36415 COLL VENOUS BLD VENIPUNCTURE: CPT

## 2023-09-14 PROCEDURE — 84439 ASSAY OF FREE THYROXINE: CPT

## 2023-09-14 PROCEDURE — 84443 ASSAY THYROID STIM HORMONE: CPT

## 2023-10-16 ENCOUNTER — HOSPITAL ENCOUNTER (OUTPATIENT)
Dept: RADIOLOGY | Facility: MEDICAL CENTER | Age: 78
End: 2023-10-16
Attending: NURSE PRACTITIONER
Payer: MEDICARE

## 2023-10-16 DIAGNOSIS — Z12.31 ENCOUNTER FOR SCREENING MAMMOGRAM FOR MALIGNANT NEOPLASM OF BREAST: ICD-10-CM

## 2023-10-16 PROCEDURE — 77063 BREAST TOMOSYNTHESIS BI: CPT

## 2023-11-29 ENCOUNTER — PATIENT MESSAGE (OUTPATIENT)
Dept: HEALTH INFORMATION MANAGEMENT | Facility: OTHER | Age: 78
End: 2023-11-29

## 2024-01-16 PROBLEM — E86.0 DEHYDRATION: Status: RESOLVED | Noted: 2023-04-04 | Resolved: 2024-01-16

## 2024-01-17 ENCOUNTER — OFFICE VISIT (OUTPATIENT)
Dept: CARDIOLOGY | Facility: MEDICAL CENTER | Age: 79
End: 2024-01-17
Attending: NURSE PRACTITIONER
Payer: MEDICARE

## 2024-01-17 VITALS
HEART RATE: 76 BPM | BODY MASS INDEX: 25.48 KG/M2 | WEIGHT: 172 LBS | RESPIRATION RATE: 16 BRPM | OXYGEN SATURATION: 96 % | HEIGHT: 69 IN | DIASTOLIC BLOOD PRESSURE: 64 MMHG | SYSTOLIC BLOOD PRESSURE: 114 MMHG

## 2024-01-17 DIAGNOSIS — Z86.79 S/P ABLATION OF VENTRICULAR ARRHYTHMIA: ICD-10-CM

## 2024-01-17 DIAGNOSIS — G45.9 TIA (TRANSIENT ISCHEMIC ATTACK): ICD-10-CM

## 2024-01-17 DIAGNOSIS — F17.200 TOBACCO DEPENDENCE SYNDROME: ICD-10-CM

## 2024-01-17 DIAGNOSIS — I49.3 VPC'S (VENTRICULAR PREMATURE COMPLEXES): ICD-10-CM

## 2024-01-17 DIAGNOSIS — Z98.890 S/P ABLATION OF VENTRICULAR ARRHYTHMIA: ICD-10-CM

## 2024-01-17 DIAGNOSIS — E78.49 OTHER HYPERLIPIDEMIA: ICD-10-CM

## 2024-01-17 PROCEDURE — 99212 OFFICE O/P EST SF 10 MIN: CPT | Performed by: NURSE PRACTITIONER

## 2024-01-17 PROCEDURE — 99214 OFFICE O/P EST MOD 30 MIN: CPT | Performed by: NURSE PRACTITIONER

## 2024-01-17 PROCEDURE — 3078F DIAST BP <80 MM HG: CPT | Performed by: NURSE PRACTITIONER

## 2024-01-17 PROCEDURE — 3074F SYST BP LT 130 MM HG: CPT | Performed by: NURSE PRACTITIONER

## 2024-01-17 ASSESSMENT — ENCOUNTER SYMPTOMS
PND: 0
ORTHOPNEA: 0
PALPITATIONS: 1
CLAUDICATION: 0
DIZZINESS: 0
MYALGIAS: 0
COUGH: 0
SHORTNESS OF BREATH: 0
ABDOMINAL PAIN: 0
FEVER: 0

## 2024-01-17 ASSESSMENT — FIBROSIS 4 INDEX: FIB4 SCORE: 1.76

## 2024-01-17 NOTE — PROGRESS NOTES
Subjective:   Felipa Stuart is a 78 y.o. female who presents today for review of symptoms.    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    She presents today with her spouse. She has improvement in symptoms since our last appointment.    Occasional palpitations, short lived. Some spider veins in ankles with leg swelling.    She has had no episodes of shortness of breath, chest pain, dizziness/lightheadedness, or orthopnea.    Past Medical History:   Diagnosis Date    AIVR (accelerated idioventricular rhythm) ([facility]) on Zio     Anesthesia     post op nausea    Arrhythmia     bigeminal PVC's, SVT with questionable Afib    Bradycardia     CATARACT     bianka IOL    Chicken pox     as child     Chronic anticoagulation - concern for afib     Heart burn     Hiatal hernia     Hiatus hernia syndrome     repaired 2/28/2016    HX: benign breast biopsy     Rt breast    Hypothyroidism     Measles     as child     PONV (postoperative nausea and vomiting)     Scarlet fever     as child     Stroke (HCC) 2/28/2016    mild speech delay    TIA (transient ischemic attack) 06/11/2013     Past Surgical History:   Procedure Laterality Date    PB TOTAL KNEE ARTHROPLASTY Right 10/18/2022    Procedure: RIGHT TOTAL KNEE ARTHROPLASTY;  Surgeon: Hemant Vaughan M.D.;  Location: [facility];  Service: Orthopedics    AL LAP,ESOPHAGOGAST FUNDOPLASTY  7/16/2019    Procedure: FUNDOPLICATION, NISSEN, LAPAROSCOPIC- REPAIR PARAESOPHAGEAL HIATAL HERNIA WITH MESH;  Surgeon: John H Ganser, M.D.;  Location: SURGERY [facility];  Service: General    RECOVERY  7/13/2016    Procedure: CATH LAB-PVC ABLATION WAGNER-REGAN -CARTO;  Surgeon: Recoveryonly Surgery;  Location: SURGERY PRE-POST PROC [facility] [place];  Service:     OTHER ABDOMINAL SURGERY  2/2016    hiatal hernia repair, Dr Darnell    FULL THICKNESS BLOCK  RESECTION  3/18/2014    Performed by You Abbott M.D. at [facility]    OTHER  2013    bladder sling     OTHER ABDOMINAL SURGERY  2013    cholecystectomy    CATARACT EXTRACTION WITH IOL Bilateral 2008    LUMPECTOMY Right 1992    right breast    THYROID LOBECTOMY  1976    OTHER  1975    D&C and tubal ligation     ZZZ CARDIAC CATH      ablation     Family History   Problem Relation Age of Onset    Cancer Mother      Social History     Tobacco Use   Smoking Status Former    Current packs/day: 0.00    Average packs/day: 0.2 packs/day for 20.0 years (4.0 ttl pk-yrs)    Types: Cigarettes    Start date: 1960    Quit date: 1980    Years since quittin.0   Smokeless Tobacco Never     No Known Allergies  Outpatient Encounter Medications as of 2024   Medication Sig Dispense Refill    multivitamin Tab Take 1 Tablet by mouth every day.      atorvastatin (LIPITOR) 20 MG Tab Take 1 Tablet by mouth at bedtime. 100 Tablet 3    aspirin EC (ECOTRIN) 81 MG Tablet Delayed Response Take 1 Tablet by mouth every day. 90 Tablet 3    liothyronine (CYTOMEL) 5 MCG Tab Take 5 mcg by mouth every day.      LEVOXYL 75 MCG Tab Take 75 mcg by mouth every morning on an empty stomach.      omeprazole (PRILOSEC) 20 MG delayed-release capsule Take 20 mg by mouth every day.      vitamin D (CHOLECALCIFEROL) 1000 UNIT Tab Take 2,000 Units by mouth every day.       No facility-administered encounter medications on file as of 2024.     Review of Systems   Constitutional:  Negative for fever and malaise/fatigue.   Respiratory:  Negative for cough and shortness of breath.    Cardiovascular:  Positive for palpitations and leg swelling. Negative for chest pain, orthopnea, claudication and PND.   Gastrointestinal:  Negative for abdominal pain.   Musculoskeletal:  Positive for joint pain. Negative for myalgias.   Neurological:  Negative for dizziness.        Objective:   /64 (BP Location: Left arm, Patient Position:  "Sitting, BP Cuff Size: Adult)   Pulse 76   Resp 16   Ht 1.753 m (5' 9\")   Wt 78 kg (172 lb)   SpO2 96%   BMI 25.40 kg/m²     Physical Exam  Vitals and nursing note reviewed.   Constitutional:       General: She is not in acute distress.     Appearance: Normal appearance. She is well-developed and normal weight.   HENT:      Head: Normocephalic and atraumatic.   Neck:      Vascular: No JVD.   Cardiovascular:      Rate and Rhythm: Normal rate and regular rhythm.      Pulses: Normal pulses.      Heart sounds: Normal heart sounds. No murmur heard.  Pulmonary:      Effort: Pulmonary effort is normal. No respiratory distress.      Breath sounds: Normal breath sounds.   Abdominal:      General: Bowel sounds are normal.      Palpations: Abdomen is soft.   Musculoskeletal:         General: Normal range of motion.      Cervical back: Normal range of motion.      Right lower leg: Edema present.      Left lower leg: Edema present.      Comments: Spider veins on bilateral legs with mild edema non-pitting   Skin:     General: Skin is warm and dry.      Capillary Refill: Capillary refill takes less than 2 seconds.   Neurological:      General: No focal deficit present.      Mental Status: She is alert and oriented to person, place, and time. Mental status is at baseline.   Psychiatric:         Mood and Affect: Mood normal.         Behavior: Behavior normal.         Thought Content: Thought content normal.         Judgment: Judgment normal.         Assessment:     1. VPC's (ventricular premature complexes)        2. Tobacco dependence syndrome        3. TIA (transient ischemic attack)        4. S/P ablation of ventricular arrhythmia  EC-ECHOCARDIOGRAM COMPLETE W/O CONT      5. Other hyperlipidemia  Lipid Profile    Comp Metabolic Panel          Medical Decision Making:  Today's Assessment / Status / Plan:     1. PVC's/PAC's with PVC ablation in '16  -follow  -OK to add low dose bb for further palpitations  -taken off eliquis " with spontaneous hemarthrosis  -echo this year for surveillance     2. HLD with TIA/CVA  -no deficits  -cont asa and statin  -no afib on last heart monitor  -LDL goal <70 with CVA, follow labs    HCC Gap Form    Last edited 01/16/24 14:01 PST by KARTHIK Curtis.       Patient is to follow up with Alis MILES in 1 year with annual labs and echocardiogram.

## 2024-01-31 NOTE — PROGRESS NOTES
2 RN skin check complete with PJ RN    5 lap sites to abdomen, covered with gauze and tagederm  No abnormalities noted to bony prominences  Back and sacrum has some areas of redness, that blanches  Checked under NC and bilateral SCD's, no abnormalities noted at this time   Parent called to request refill:  ipratropium (ATROVENT) 0.06 % nasal spray     LOV = 1/31/2024 Acute  Last Refill = n/a    To be sent to:  [id number] - [place], [place] - [address] [place] [facility] [place] [address] [phone]

## 2024-02-08 ENCOUNTER — HOSPITAL ENCOUNTER (OUTPATIENT)
Dept: LAB | Facility: MEDICAL CENTER | Age: 79
End: 2024-02-08
Attending: NURSE PRACTITIONER
Payer: MEDICARE

## 2024-02-08 ENCOUNTER — HOSPITAL ENCOUNTER (OUTPATIENT)
Dept: LAB | Facility: MEDICAL CENTER | Age: 79
End: 2024-02-08
Attending: INTERNAL MEDICINE
Payer: MEDICARE

## 2024-02-08 LAB
25(OH)D3 SERPL-MCNC: 79 NG/ML (ref 30–100)
ANION GAP SERPL CALC-SCNC: 14 MMOL/L (ref 7–16)
BUN SERPL-MCNC: 22 MG/DL (ref 8–22)
CALCIUM SERPL-MCNC: 9.4 MG/DL (ref 8.5–10.5)
CHLORIDE SERPL-SCNC: 104 MMOL/L (ref 96–112)
CHOLEST SERPL-MCNC: 144 MG/DL (ref 100–199)
CO2 SERPL-SCNC: 23 MMOL/L (ref 20–33)
CREAT SERPL-MCNC: 0.76 MG/DL (ref 0.5–1.4)
FASTING STATUS PATIENT QL REPORTED: NORMAL
GFR SERPLBLD CREATININE-BSD FMLA CKD-EPI: 79 ML/MIN/1.73 M 2
GLUCOSE SERPL-MCNC: 88 MG/DL (ref 65–99)
HDLC SERPL-MCNC: 60 MG/DL
LDLC SERPL CALC-MCNC: 70 MG/DL
POTASSIUM SERPL-SCNC: 4.1 MMOL/L (ref 3.6–5.5)
SODIUM SERPL-SCNC: 141 MMOL/L (ref 135–145)
T3FREE SERPL-MCNC: 2.19 PG/ML (ref 2–4.4)
T4 FREE SERPL-MCNC: 1.23 NG/DL (ref 0.93–1.7)
TRIGL SERPL-MCNC: 72 MG/DL (ref 0–149)
TSH SERPL DL<=0.005 MIU/L-ACNC: 0.85 UIU/ML (ref 0.38–5.33)

## 2024-02-08 PROCEDURE — 80048 BASIC METABOLIC PNL TOTAL CA: CPT

## 2024-02-08 PROCEDURE — 84443 ASSAY THYROID STIM HORMONE: CPT

## 2024-02-08 PROCEDURE — 84481 FREE ASSAY (FT-3): CPT

## 2024-02-08 PROCEDURE — 82306 VITAMIN D 25 HYDROXY: CPT

## 2024-02-08 PROCEDURE — 84439 ASSAY OF FREE THYROXINE: CPT

## 2024-02-08 PROCEDURE — 36415 COLL VENOUS BLD VENIPUNCTURE: CPT

## 2024-02-08 PROCEDURE — 80061 LIPID PANEL: CPT

## 2024-05-07 ENCOUNTER — HOSPITAL ENCOUNTER (OUTPATIENT)
Dept: LAB | Facility: MEDICAL CENTER | Age: 79
End: 2024-05-07
Attending: INTERNAL MEDICINE
Payer: MEDICARE

## 2024-05-07 LAB
T3FREE SERPL-MCNC: 2.91 PG/ML (ref 2–4.4)
T4 FREE SERPL-MCNC: 1.15 NG/DL (ref 0.93–1.7)
TSH SERPL DL<=0.005 MIU/L-ACNC: 0.11 UIU/ML (ref 0.38–5.33)

## 2024-08-07 ENCOUNTER — HOSPITAL ENCOUNTER (OUTPATIENT)
Dept: LAB | Facility: MEDICAL CENTER | Age: 79
End: 2024-08-07
Attending: INTERNAL MEDICINE
Payer: MEDICARE

## 2024-08-07 LAB
T3FREE SERPL-MCNC: 2.76 PG/ML (ref 2–4.4)
T4 FREE SERPL-MCNC: 1.03 NG/DL (ref 0.93–1.7)
TSH SERPL-ACNC: 0.19 UIU/ML (ref 0.35–5.5)

## 2024-08-07 PROCEDURE — 84481 FREE ASSAY (FT-3): CPT

## 2024-08-07 PROCEDURE — 36415 COLL VENOUS BLD VENIPUNCTURE: CPT

## 2024-08-07 PROCEDURE — 84443 ASSAY THYROID STIM HORMONE: CPT

## 2024-08-07 PROCEDURE — 84439 ASSAY OF FREE THYROXINE: CPT

## 2024-10-21 ENCOUNTER — HOSPITAL ENCOUNTER (OUTPATIENT)
Dept: RADIOLOGY | Facility: MEDICAL CENTER | Age: 79
End: 2024-10-21
Attending: NURSE PRACTITIONER
Payer: MEDICARE

## 2024-10-21 DIAGNOSIS — Z12.31 VISIT FOR SCREENING MAMMOGRAM: ICD-10-CM

## 2024-10-21 PROCEDURE — 77067 SCR MAMMO BI INCL CAD: CPT

## 2024-10-22 ENCOUNTER — HOSPITAL ENCOUNTER (OUTPATIENT)
Dept: LAB | Facility: MEDICAL CENTER | Age: 79
End: 2024-10-22
Attending: INTERNAL MEDICINE
Payer: MEDICARE

## 2024-10-22 LAB
T3FREE SERPL-MCNC: 3.11 PG/ML (ref 2–4.4)
T4 FREE SERPL-MCNC: 1.21 NG/DL (ref 0.93–1.7)
TSH SERPL-ACNC: 0.04 UIU/ML (ref 0.35–5.5)

## 2024-10-22 PROCEDURE — 36415 COLL VENOUS BLD VENIPUNCTURE: CPT

## 2024-10-22 PROCEDURE — 84481 FREE ASSAY (FT-3): CPT

## 2024-10-22 PROCEDURE — 84443 ASSAY THYROID STIM HORMONE: CPT

## 2024-10-22 PROCEDURE — 84439 ASSAY OF FREE THYROXINE: CPT

## 2024-11-20 ENCOUNTER — HOSPITAL ENCOUNTER (OUTPATIENT)
Dept: CARDIOLOGY | Facility: MEDICAL CENTER | Age: 79
End: 2024-11-20
Attending: NURSE PRACTITIONER
Payer: MEDICARE

## 2024-11-20 DIAGNOSIS — Z98.890 S/P ABLATION OF VENTRICULAR ARRHYTHMIA: ICD-10-CM

## 2024-11-20 DIAGNOSIS — Z86.79 S/P ABLATION OF VENTRICULAR ARRHYTHMIA: ICD-10-CM

## 2024-11-20 PROCEDURE — 93306 TTE W/DOPPLER COMPLETE: CPT

## 2024-11-21 ENCOUNTER — PATIENT MESSAGE (OUTPATIENT)
Dept: CARDIOLOGY | Facility: MEDICAL CENTER | Age: 79
End: 2024-11-21
Payer: MEDICARE

## 2024-11-21 LAB
LV EJECT FRACT  99904: 55
LV EJECT FRACT MOD 2C 99903: 52.54
LV EJECT FRACT MOD 4C 99902: 56.61
LV EJECT FRACT MOD BP 99901: 54.72

## 2024-11-21 PROCEDURE — 93306 TTE W/DOPPLER COMPLETE: CPT | Mod: 26 | Performed by: INTERNAL MEDICINE

## 2024-11-21 NOTE — PATIENT COMMUNICATION
To SC: Patient messaged for recommendations regarding echo results. Small PFO noted not listed in previous history, last echo in chart from 2016. Please review and advise what to tell patient, thank you!

## 2024-11-22 NOTE — PATIENT COMMUNICATION
Alis Luna, OLIVIER.P.R.N.  You21 minutes ago (12:26 PM)       See BE response. If she would like WAGNER to review, we can order this. But I think at her age with her old stroke, no need to close PFO. PFO not concerning to keep and looks too small to close anyways. Does she have any concerning symptoms? ALBERTA Leach M.D.  Alis Luna, OLIVIER.P.R.N.36 minutes ago (12:11 PM)       Looks like PFO or ASD. Not well characterized by the study. I dont think the RV looks enlarged so it should be ok for observation unless you think it can be implicated in the stroke - which probably not a big concern at her age. A WAGNER could clarify if she feels like she needs to know what is going on.    -BE

## 2024-11-22 NOTE — PATIENT COMMUNICATION
Phone Number Called: [phone]    Call outcome: Spoke to patient regarding message below.    Message: Called to discuss echo results with pt. Advised of SC and BE's recommendations. Pt reports having fatigue for the last few years. She reports there are days that are worse than others but no other symptoms. Pt would like to think about the WAGNER and will reach back out if she would like to proceed with it.     To SC as MCKENZIEI.

## 2024-12-06 ENCOUNTER — TELEPHONE (OUTPATIENT)
Dept: CARDIOLOGY | Facility: MEDICAL CENTER | Age: 79
End: 2024-12-06
Payer: MEDICARE

## 2024-12-06 NOTE — TELEPHONE ENCOUNTER
Called pt in regards to lab work that was ordered at previous OV. Patient states she will complete lab work before appointment. Pt has follow up appointment scheduled with SC on 12.19.2024.

## 2024-12-10 ENCOUNTER — HOSPITAL ENCOUNTER (OUTPATIENT)
Dept: LAB | Facility: MEDICAL CENTER | Age: 79
End: 2024-12-10
Attending: NURSE PRACTITIONER
Payer: MEDICARE

## 2024-12-10 DIAGNOSIS — E78.49 OTHER HYPERLIPIDEMIA: ICD-10-CM

## 2024-12-10 PROCEDURE — 80053 COMPREHEN METABOLIC PANEL: CPT

## 2024-12-10 PROCEDURE — 80061 LIPID PANEL: CPT

## 2024-12-10 PROCEDURE — 36415 COLL VENOUS BLD VENIPUNCTURE: CPT

## 2024-12-11 LAB
ALBUMIN SERPL BCP-MCNC: 4.6 G/DL (ref 3.2–4.9)
ALBUMIN/GLOB SERPL: 1.7 G/DL
ALP SERPL-CCNC: 98 U/L (ref 30–99)
ALT SERPL-CCNC: 18 U/L (ref 2–50)
ANION GAP SERPL CALC-SCNC: 13 MMOL/L (ref 7–16)
AST SERPL-CCNC: 27 U/L (ref 12–45)
BILIRUB SERPL-MCNC: 1.3 MG/DL (ref 0.1–1.5)
BUN SERPL-MCNC: 27 MG/DL (ref 8–22)
CALCIUM ALBUM COR SERPL-MCNC: 9.3 MG/DL (ref 8.5–10.5)
CALCIUM SERPL-MCNC: 9.8 MG/DL (ref 8.5–10.5)
CHLORIDE SERPL-SCNC: 103 MMOL/L (ref 96–112)
CHOLEST SERPL-MCNC: 134 MG/DL (ref 100–199)
CO2 SERPL-SCNC: 24 MMOL/L (ref 20–33)
CREAT SERPL-MCNC: 0.98 MG/DL (ref 0.5–1.4)
FASTING STATUS PATIENT QL REPORTED: NORMAL
GFR SERPLBLD CREATININE-BSD FMLA CKD-EPI: 58 ML/MIN/1.73 M 2
GLOBULIN SER CALC-MCNC: 2.7 G/DL (ref 1.9–3.5)
GLUCOSE SERPL-MCNC: 97 MG/DL (ref 65–99)
HDLC SERPL-MCNC: 64 MG/DL
LDLC SERPL CALC-MCNC: 55 MG/DL
POTASSIUM SERPL-SCNC: 4.7 MMOL/L (ref 3.6–5.5)
PROT SERPL-MCNC: 7.3 G/DL (ref 6–8.2)
SODIUM SERPL-SCNC: 140 MMOL/L (ref 135–145)
TRIGL SERPL-MCNC: 73 MG/DL (ref 0–149)

## 2024-12-19 ENCOUNTER — OFFICE VISIT (OUTPATIENT)
Dept: CARDIOLOGY | Facility: MEDICAL CENTER | Age: 79
End: 2024-12-19
Attending: NURSE PRACTITIONER
Payer: MEDICARE

## 2024-12-19 VITALS
SYSTOLIC BLOOD PRESSURE: 120 MMHG | RESPIRATION RATE: 16 BRPM | HEIGHT: 69 IN | OXYGEN SATURATION: 98 % | DIASTOLIC BLOOD PRESSURE: 58 MMHG | BODY MASS INDEX: 26.22 KG/M2 | HEART RATE: 55 BPM | WEIGHT: 177 LBS

## 2024-12-19 DIAGNOSIS — G45.9 TIA (TRANSIENT ISCHEMIC ATTACK): ICD-10-CM

## 2024-12-19 DIAGNOSIS — Z98.890 S/P ABLATION OF VENTRICULAR ARRHYTHMIA: ICD-10-CM

## 2024-12-19 DIAGNOSIS — Q21.12 PFO (PATENT FORAMEN OVALE): ICD-10-CM

## 2024-12-19 DIAGNOSIS — I10 ESSENTIAL HYPERTENSION, BENIGN: ICD-10-CM

## 2024-12-19 DIAGNOSIS — E78.49 OTHER HYPERLIPIDEMIA: ICD-10-CM

## 2024-12-19 DIAGNOSIS — G45.8 OTHER SPECIFIED TRANSIENT CEREBRAL ISCHEMIAS: ICD-10-CM

## 2024-12-19 DIAGNOSIS — Z86.79 S/P ABLATION OF VENTRICULAR ARRHYTHMIA: ICD-10-CM

## 2024-12-19 DIAGNOSIS — F17.200 TOBACCO DEPENDENCE SYNDROME: ICD-10-CM

## 2024-12-19 DIAGNOSIS — I49.3 VPC'S (VENTRICULAR PREMATURE COMPLEXES): ICD-10-CM

## 2024-12-19 LAB — EKG IMPRESSION: NORMAL

## 2024-12-19 PROCEDURE — 99214 OFFICE O/P EST MOD 30 MIN: CPT | Performed by: NURSE PRACTITIONER

## 2024-12-19 PROCEDURE — 99212 OFFICE O/P EST SF 10 MIN: CPT | Performed by: NURSE PRACTITIONER

## 2024-12-19 PROCEDURE — 93010 ELECTROCARDIOGRAM REPORT: CPT | Performed by: INTERNAL MEDICINE

## 2024-12-19 PROCEDURE — 3074F SYST BP LT 130 MM HG: CPT | Performed by: NURSE PRACTITIONER

## 2024-12-19 PROCEDURE — 3078F DIAST BP <80 MM HG: CPT | Performed by: NURSE PRACTITIONER

## 2024-12-19 PROCEDURE — 93005 ELECTROCARDIOGRAM TRACING: CPT | Mod: TC | Performed by: NURSE PRACTITIONER

## 2024-12-19 PROCEDURE — 99212 OFFICE O/P EST SF 10 MIN: CPT

## 2024-12-19 RX ORDER — ATORVASTATIN CALCIUM 20 MG/1
20 TABLET, FILM COATED ORAL
Qty: 100 TABLET | Refills: 3 | Status: SHIPPED | OUTPATIENT
Start: 2024-12-19

## 2024-12-19 ASSESSMENT — ENCOUNTER SYMPTOMS
PALPITATIONS: 1
FEVER: 0
DIZZINESS: 0
CLAUDICATION: 0
ABDOMINAL PAIN: 0
COUGH: 0
ORTHOPNEA: 0
PND: 0
MYALGIAS: 0
SHORTNESS OF BREATH: 0

## 2024-12-19 ASSESSMENT — FIBROSIS 4 INDEX: FIB4 SCORE: 2.56

## 2024-12-19 NOTE — PATIENT INSTRUCTIONS
Obtain repeat echocardiogram with bubble study in 3-6 months for re-check.    Obtain heart monitor for 14 days to evaluate heart palpitations.

## 2024-12-19 NOTE — PROGRESS NOTES
Subjective:   Felipa Stuart is a 79 y.o. female who presents today for review of symptoms.    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    She presents today with her spouse.     We discussed her echocardiogram results in detail. Palpitations at times that she feels in her throat.    She has had no episodes of shortness of breath, chest pain, dizziness/lightheadedness, or orthopnea.    Past Medical History:   Diagnosis Date    AIVR (accelerated idioventricular rhythm) (HCC) on Zio     Anesthesia     post op nausea    Arrhythmia     bigeminal PVC's, SVT with questionable Afib    Bradycardia     CATARACT     bianka IOL    Chicken pox     as child     Chronic anticoagulation - concern for afib     Heart burn     Hiatal hernia     Hiatus hernia syndrome     repaired 2/28/2016    HX: benign breast biopsy     Rt breast    Hypothyroidism     Measles     as child     PONV (postoperative nausea and vomiting)     Scarlet fever     as child     Stroke (HCC) 2/28/2016    mild speech delay    TIA (transient ischemic attack) 06/11/2013     Past Surgical History:   Procedure Laterality Date    PB TOTAL KNEE ARTHROPLASTY Right 10/18/2022    Procedure: RIGHT TOTAL KNEE ARTHROPLASTY;  Surgeon: Hemant Vaughan M.D.;  Location: [facility] - External Facilities;  Service: Orthopedics    NC LAP ESOPHAGOGAST FUNDOPLASTY  7/16/2019    Procedure: FUNDOPLICATION, NISSEN, LAPAROSCOPIC- REPAIR PARAESOPHAGEAL HIATAL HERNIA WITH MESH;  Surgeon: John H Ganser, M.D.;  Location: SURGERY [facility];  Service: General    RECOVERY  7/13/2016    Procedure: CATH LAB-PVC ABLATION WAGNER-REGAN -CARTO;  Surgeon: Recoveryonly Surgery;  Location: SURGERY PRE-POST PROC UNIT [facility];  Service:     OTHER ABDOMINAL SURGERY  2/2016    hiatal hernia repair, Dr Darnell    FULL THICKNESS BLOCK RESECTION  3/18/2014    Performed by You CRAIG  CHRIS Abbott at [facility]    OTHER  2013    bladder sling     OTHER ABDOMINAL SURGERY  2013    cholecystectomy    CATARACT EXTRACTION WITH IOL Bilateral 2008    LUMPECTOMY Right 1992    right breast    THYROID LOBECTOMY  1976    OTHER  1975    D&C and tubal ligation     ZZZ CARDIAC CATH      ablation     Family History   Problem Relation Age of Onset    Cancer Mother      Social History     Tobacco Use   Smoking Status Former    Current packs/day: 0.00    Average packs/day: 0.2 packs/day for 20.0 years (4.0 ttl pk-yrs)    Types: Cigarettes    Start date: 1960    Quit date: 1980    Years since quittin.9   Smokeless Tobacco Never     No Known Allergies  Outpatient Encounter Medications as of 2024   Medication Sig Dispense Refill    multivitamin Tab Take 1 Tablet by mouth 2 (two) times a day.      atorvastatin (LIPITOR) 20 MG Tab Take 1 Tablet by mouth at bedtime. 100 Tablet 3    aspirin EC (ECOTRIN) 81 MG Tablet Delayed Response Take 1 Tablet by mouth every day. 90 Tablet 3    liothyronine (CYTOMEL) 5 MCG Tab Take 5 mcg by mouth every day.      LEVOXYL 75 MCG Tab Take 75 mcg by mouth every morning on an empty stomach. Except on Thursday takes extra half a pill      omeprazole (PRILOSEC) 20 MG delayed-release capsule Take 20 mg by mouth every day.      vitamin D (CHOLECALCIFEROL) 1000 UNIT Tab Take 2,000 Units by mouth every day.       No facility-administered encounter medications on file as of 2024.     Review of Systems   Constitutional:  Negative for fever and malaise/fatigue.   Respiratory:  Negative for cough and shortness of breath.    Cardiovascular:  Positive for palpitations and leg swelling. Negative for chest pain, orthopnea, claudication and PND.   Gastrointestinal:  Negative for abdominal pain.   Musculoskeletal:  Positive for joint pain. Negative for myalgias.   Neurological:  Negative for dizziness.        Objective:   /58 (BP Location: Left arm, Patient  "Position: Sitting, BP Cuff Size: Adult)   Pulse (!) 55   Resp 16   Ht 1.753 m (5' 9\")   Wt 80.3 kg (177 lb)   SpO2 98%   BMI 26.14 kg/m²     Physical Exam  Vitals and nursing note reviewed.   Constitutional:       General: She is not in acute distress.     Appearance: Normal appearance. She is well-developed and normal weight.   HENT:      Head: Normocephalic and atraumatic.   Neck:      Vascular: No JVD.   Cardiovascular:      Rate and Rhythm: Normal rate and regular rhythm.      Pulses: Normal pulses.      Heart sounds: Normal heart sounds. No murmur heard.  Pulmonary:      Effort: Pulmonary effort is normal. No respiratory distress.      Breath sounds: Normal breath sounds.   Abdominal:      General: Bowel sounds are normal.      Palpations: Abdomen is soft.   Musculoskeletal:         General: Normal range of motion.      Cervical back: Normal range of motion.      Right lower leg: Edema present.      Left lower leg: Edema present.      Comments: Spider veins on bilateral legs with mild edema non-pitting   Skin:     General: Skin is warm and dry.      Capillary Refill: Capillary refill takes less than 2 seconds.   Neurological:      General: No focal deficit present.      Mental Status: She is alert and oriented to person, place, and time. Mental status is at baseline.   Psychiatric:         Mood and Affect: Mood normal.         Behavior: Behavior normal.         Thought Content: Thought content normal.         Judgment: Judgment normal.         Assessment:     1. Other hyperlipidemia        2. S/P ablation of ventricular arrhythmia        3. TIA (transient ischemic attack)        4. Tobacco dependence syndrome        5. VPC's (ventricular premature complexes)            Medical Decision Making:  Today's Assessment / Status / Plan:     1. PVC's/PAC's with PVC ablation in '16  -follow, obtain heart monitor for 14 days  -OK to add low dose bb for further palpitations  -taken off eliquis with spontaneous " hemarthrosis  -cont asa only    2. HLD with TIA/CVA in '16, new finding of small PFO  -no focal deficits, some word finding impairment remains but mild  -cont asa and statin (refilled)  -annual labs in December 2025  -no afib on last heart monitor, repeat monitor  -obtain repeat echo with bubble study in 3-6 months  -LDL goal <70 with CVA, follow labs    Patient is to follow up with Alis MILES in 3-6 months with echo and heart monitor.

## 2025-01-08 ENCOUNTER — NON-PROVIDER VISIT (OUTPATIENT)
Dept: CARDIOLOGY | Facility: MEDICAL CENTER | Age: 80
End: 2025-01-08
Attending: NURSE PRACTITIONER
Payer: MEDICARE

## 2025-01-08 DIAGNOSIS — Z86.79 S/P ABLATION OF VENTRICULAR ARRHYTHMIA: ICD-10-CM

## 2025-01-08 DIAGNOSIS — Z98.890 S/P ABLATION OF VENTRICULAR ARRHYTHMIA: ICD-10-CM

## 2025-01-08 PROCEDURE — 93246 EXT ECG>7D<15D RECORDING: CPT

## 2025-01-08 NOTE — PROGRESS NOTES
Patient enrolled in the 14 day o Holter monitoring program per Alis Luna, APRN.  >Office hook-up, serial # [id number]  >Currently pending EOS.

## 2025-01-29 ENCOUNTER — HOSPITAL ENCOUNTER (OUTPATIENT)
Dept: LAB | Facility: MEDICAL CENTER | Age: 80
End: 2025-01-29
Attending: INTERNAL MEDICINE
Payer: MEDICARE

## 2025-01-29 ENCOUNTER — TELEPHONE (OUTPATIENT)
Dept: CARDIOLOGY | Facility: MEDICAL CENTER | Age: 80
End: 2025-01-29
Payer: MEDICARE

## 2025-01-29 PROCEDURE — 36415 COLL VENOUS BLD VENIPUNCTURE: CPT

## 2025-01-29 PROCEDURE — 84443 ASSAY THYROID STIM HORMONE: CPT

## 2025-01-29 PROCEDURE — 84439 ASSAY OF FREE THYROXINE: CPT

## 2025-01-29 PROCEDURE — 84481 FREE ASSAY (FT-3): CPT

## 2025-01-29 NOTE — TELEPHONE ENCOUNTER
KARTHIK Curtis.  You41 minutes ago (12:51 PM)     Wait for final read, check on symptoms of lightheadedness, fatigue. SC

## 2025-01-29 NOTE — TELEPHONE ENCOUNTER
HIPOLITO EOS to SC's nurse, Jahaira, on 1/29/2025    Junctional Rhythm was detected within +/- 45 seconds of symptomatic patient events    Preliminary findings:    1 episode of VT  with an avg rate of 108 bpm    21 episodes of SVT  with an avg rate of 121 bpm    Sinus Rhythm  with an avg rate of 60 bpm    Isolated SVE(s) were occasional (1.2%), SVE couplets and triplets were rare    Isolated VE(s) were frequent (7.7%), VE couplets and triplets were rare    Ventricular bigeminy and trigeminy were present    3 patient events associated with:  SR 59-86  Junctional Rhythm 92  Ventricular bigeminy  Ventricular trigeminy  SVE(s)  VE(s)

## 2025-01-29 NOTE — TELEPHONE ENCOUNTER
Phone Number Called: [phone]    Call outcome: Spoke to patient regarding message below.    Message: Called and s/w pt to check if she has had any symptoms per SC. Pt states she has no lightheadness/dizziness/fatigue. Pt reports once in a while she feels like its hard to breathe, lasting 30sec - 1 min. Pt states no triggering events and it goes away on its own, very infrequent per pt. Advised pt cardiac monitor is being reviewed and if further recommendations are provided, we will reach out. Pt verbalized understanding.

## 2025-01-29 NOTE — TELEPHONE ENCOUNTER
To AL: ADD 1/29 - Please review and sign monitor for SC. Thank you!    To SC: ANDREW - Cardiac monitor for review. Thank you!

## 2025-01-30 LAB
T3FREE SERPL-MCNC: 2.82 PG/ML (ref 2–4.4)
T4 FREE SERPL-MCNC: 1.17 NG/DL (ref 0.93–1.7)
TSH SERPL-ACNC: 0.02 UIU/ML (ref 0.35–5.5)

## 2025-01-31 ENCOUNTER — HOSPITAL ENCOUNTER (OUTPATIENT)
Dept: RADIOLOGY | Facility: MEDICAL CENTER | Age: 80
End: 2025-01-31
Attending: INTERNAL MEDICINE
Payer: MEDICARE

## 2025-01-31 DIAGNOSIS — E04.2 NONTOXIC MULTINODULAR GOITER: ICD-10-CM

## 2025-01-31 PROCEDURE — 76536 US EXAM OF HEAD AND NECK: CPT

## 2025-02-13 ENCOUNTER — PATIENT MESSAGE (OUTPATIENT)
Dept: CARDIOLOGY | Facility: MEDICAL CENTER | Age: 80
End: 2025-02-13
Payer: MEDICARE

## 2025-02-20 ENCOUNTER — PATIENT MESSAGE (OUTPATIENT)
Dept: CARDIOLOGY | Facility: MEDICAL CENTER | Age: 80
End: 2025-02-20
Payer: MEDICARE

## 2025-02-25 ENCOUNTER — HOSPITAL ENCOUNTER (OUTPATIENT)
Dept: CARDIOLOGY | Facility: MEDICAL CENTER | Age: 80
End: 2025-02-25
Attending: NURSE PRACTITIONER
Payer: MEDICARE

## 2025-02-25 ENCOUNTER — RESULTS FOLLOW-UP (OUTPATIENT)
Dept: CARDIOLOGY | Facility: PHYSICIAN GROUP | Age: 80
End: 2025-02-25

## 2025-02-25 DIAGNOSIS — Q21.12 PFO (PATENT FORAMEN OVALE): ICD-10-CM

## 2025-02-25 LAB
LV EJECT FRACT  99904: 60
LV EJECT FRACT MOD 2C 99903: 60.14

## 2025-02-25 PROCEDURE — 93306 TTE W/DOPPLER COMPLETE: CPT

## 2025-02-28 ENCOUNTER — TELEPHONE (OUTPATIENT)
Dept: CARDIOLOGY | Facility: MEDICAL CENTER | Age: 80
End: 2025-02-28
Payer: MEDICARE

## 2025-02-28 NOTE — TELEPHONE ENCOUNTER
Alis Luna, A.P.R.N.  2/25/2025  4:17 PM PST Back to Top      Recommend AK apt for potential PFO closure if not seen him already. Hx of CVA. Heart monitor and TTE completed. Okay for May appt if needed. SC

## 2025-02-28 NOTE — TELEPHONE ENCOUNTER
Called and spoke to patient. PFO consult scheduled with Dr. Avila on 3/21/2025. All questions answered. Patient verbalizes understanding.

## 2025-03-04 ENCOUNTER — TELEPHONE (OUTPATIENT)
Dept: CARDIOLOGY | Facility: MEDICAL CENTER | Age: 80
End: 2025-03-04
Payer: MEDICARE

## 2025-03-04 NOTE — TELEPHONE ENCOUNTER
Called and spoke to patient. Offered sooner appointment with Dr. Avila tomorrow 3/5. Patient in agreement to move up consult.

## 2025-03-05 ENCOUNTER — OFFICE VISIT (OUTPATIENT)
Dept: CARDIOLOGY | Facility: MEDICAL CENTER | Age: 80
End: 2025-03-05
Attending: INTERNAL MEDICINE
Payer: MEDICARE

## 2025-03-05 ENCOUNTER — TELEPHONE (OUTPATIENT)
Dept: CARDIOLOGY | Facility: MEDICAL CENTER | Age: 80
End: 2025-03-05

## 2025-03-05 VITALS
HEART RATE: 53 BPM | DIASTOLIC BLOOD PRESSURE: 70 MMHG | SYSTOLIC BLOOD PRESSURE: 128 MMHG | BODY MASS INDEX: 26.81 KG/M2 | RESPIRATION RATE: 16 BRPM | HEIGHT: 69 IN | OXYGEN SATURATION: 98 % | WEIGHT: 181 LBS

## 2025-03-05 DIAGNOSIS — Q21.12 PFO (PATENT FORAMEN OVALE): ICD-10-CM

## 2025-03-05 DIAGNOSIS — I63.9 CEREBROVASCULAR ACCIDENT (CVA), UNSPECIFIED MECHANISM (HCC): ICD-10-CM

## 2025-03-05 PROCEDURE — 3078F DIAST BP <80 MM HG: CPT | Performed by: INTERNAL MEDICINE

## 2025-03-05 PROCEDURE — 99212 OFFICE O/P EST SF 10 MIN: CPT | Performed by: INTERNAL MEDICINE

## 2025-03-05 PROCEDURE — 3074F SYST BP LT 130 MM HG: CPT | Performed by: INTERNAL MEDICINE

## 2025-03-05 PROCEDURE — 99214 OFFICE O/P EST MOD 30 MIN: CPT | Performed by: INTERNAL MEDICINE

## 2025-03-05 ASSESSMENT — FIBROSIS 4 INDEX: FIB4 SCORE: 2.56

## 2025-03-06 NOTE — PROGRESS NOTES
Interventional cardiology Follow-up Consultation Note        CC: CVA, PFO    Patient ID/HPI:   79-year-old female patient with history of embolic CVA, PFO here for consultation.  She feels well currently.  She complains of palpitations occasionally last for few minutes and gets better.  After her stroke she was placed empirically on Eliquis but she had recurrent bleeding into her knee, subsequently Eliquis was stopped.      Past Medical History:   Diagnosis Date    AIVR (accelerated idioventricular rhythm) ([facility]) on Zio     Anesthesia     post op nausea    Arrhythmia     bigeminal PVC's, SVT with questionable Afib    Bradycardia     CATARACT     bianka IOL    Chicken pox     as child     Chronic anticoagulation - concern for afib     Heart burn     Hiatal hernia     Hiatus hernia syndrome     repaired 2/28/2016    HX: benign breast biopsy     Rt breast    Hypothyroidism     Measles     as child     PONV (postoperative nausea and vomiting)     Scarlet fever     as child     Stroke ([facility]) 2/28/2016    mild speech delay    TIA (transient ischemic attack) 06/11/2013         Current Outpatient Medications   Medication Sig Dispense Refill    atorvastatin (LIPITOR) 20 MG Tab Take 1 Tablet by mouth at bedtime. 100 Tablet 3    multivitamin Tab Take 1 Tablet by mouth 2 (two) times a day.      aspirin EC (ECOTRIN) 81 MG Tablet Delayed Response Take 1 Tablet by mouth every day. 90 Tablet 3    liothyronine (CYTOMEL) 5 MCG Tab Take 5 mcg by mouth every day.      LEVOXYL 75 MCG Tab Take 75 mcg by mouth every morning on an empty stomach. Except on Thursday takes extra half a pill      omeprazole (PRILOSEC) 20 MG delayed-release capsule Take 20 mg by mouth every day.      vitamin D (CHOLECALCIFEROL) 1000 UNIT Tab Take 2,000 Units by mouth every day. (Patient taking differently: Take 4,000 Units by mouth every day.)       No current facility-administered medications for this visit.         Physical Exam:  Ambulatory Vitals  BP  "128/70 (BP Location: Left arm, Patient Position: Sitting, BP Cuff Size: Adult)   Pulse (!) 53   Resp 16   Ht 1.753 m (5' 9\")   Wt 82.1 kg (181 lb)   SpO2 98%    Oxygen Therapy:  Pulse Oximetry: 98 %  BP Readings from Last 4 Encounters:   03/05/25 128/70   12/19/24 120/58   01/17/24 114/64   07/06/23 118/74       Weight/BMI: Body mass index is 26.73 kg/m².  Wt Readings from Last 4 Encounters:   03/05/25 82.1 kg (181 lb)   12/19/24 80.3 kg (177 lb)   01/17/24 78 kg (172 lb)   07/06/23 74.8 kg (165 lb)       General: Well appearing and in no apparent distress  Neck: JVP absent, carotid bruits absent  Lungs: respiratory sounds  normal, additional breath sounds absent  Heart: Regular rhythm,   No palpable thrills on palpation, murmurs absent, no rubs,   Lower extremity edema absent.         Medical Decision Making:  Problem List Items Addressed This Visit    None  Visit Diagnoses         Cerebrovascular accident (CVA), unspecified mechanism (HCC)        Relevant Orders    CL-IMPLANTABLE LOOP RECORDER          79-year-old female patient with clear embolic stroke, PFO.  Atrial fibrillation is more likely the reason than PFO and her age.  She also complains of palpitations, heart racing intermittently but unable to document any atrial fibrillation in the past by Holter monitoring's.  I recommend placing a loop recorder, monitoring her for 6 months to a year.  If she has A-fib she will benefit from Watchman device, she did not tolerate anticoagulation before.  If she does not have A-fib PFO closure could be considered, but this will be a less likely scenario        Ed SAPP  Interventional cardiologist  [facility], [address]  [address] [address]  [place] [address]  Phone: [phone]  Fax: [phone]     "

## 2025-03-07 ENCOUNTER — HOSPITAL ENCOUNTER (OUTPATIENT)
Dept: RADIOLOGY | Facility: MEDICAL CENTER | Age: 80
End: 2025-03-07
Attending: INTERNAL MEDICINE
Payer: MEDICARE

## 2025-03-07 DIAGNOSIS — M81.0 SENILE OSTEOPOROSIS: ICD-10-CM

## 2025-03-07 PROCEDURE — 77080 DXA BONE DENSITY AXIAL: CPT

## 2025-03-07 NOTE — TELEPHONE ENCOUNTER
Patient is scheduled for a Loop Insert on 03- with Dr. Shore at [facility]. Patient   has been instructed to check in at 9:00 am for 10:00 procedure. No prep. Message sent to authorizations. Sent [employer] message to pt with instructions. Leandro at [employer]  notified of case. ANDREW sent to [employer]

## 2025-03-12 ENCOUNTER — APPOINTMENT (OUTPATIENT)
Dept: ADMISSIONS | Facility: MEDICAL CENTER | Age: 80
End: 2025-03-12
Attending: INTERNAL MEDICINE
Payer: MEDICARE

## 2025-03-13 ENCOUNTER — PRE-ADMISSION TESTING (OUTPATIENT)
Dept: ADMISSIONS | Facility: MEDICAL CENTER | Age: 80
End: 2025-03-13
Attending: INTERNAL MEDICINE
Payer: MEDICARE

## 2025-03-13 VITALS — BODY MASS INDEX: 25.97 KG/M2 | HEIGHT: 70 IN

## 2025-03-13 RX ORDER — VITAMIN B COMPLEX
4000 TABLET ORAL DAILY
COMMUNITY

## 2025-03-13 NOTE — PREPROCEDURE INSTRUCTIONS
Pt preadmitted via phone, no restrictions on meds or fasting per cardiology orders. Pt aware to check in at the [facility] Entrance. Questions answered.

## 2025-03-19 ENCOUNTER — APPOINTMENT (OUTPATIENT)
Facility: MEDICAL CENTER | Age: 80
End: 2025-03-19
Attending: INTERNAL MEDICINE
Payer: MEDICARE

## 2025-03-19 ENCOUNTER — HOSPITAL ENCOUNTER (OUTPATIENT)
Facility: MEDICAL CENTER | Age: 80
End: 2025-03-19
Attending: INTERNAL MEDICINE | Admitting: INTERNAL MEDICINE
Payer: MEDICARE

## 2025-03-19 VITALS
WEIGHT: 176.59 LBS | HEIGHT: 70 IN | HEART RATE: 62 BPM | RESPIRATION RATE: 18 BRPM | OXYGEN SATURATION: 98 % | BODY MASS INDEX: 25.28 KG/M2 | TEMPERATURE: 97.9 F | DIASTOLIC BLOOD PRESSURE: 61 MMHG | SYSTOLIC BLOOD PRESSURE: 146 MMHG

## 2025-03-19 DIAGNOSIS — I63.9 CEREBROVASCULAR ACCIDENT (CVA), UNSPECIFIED MECHANISM (HCC): ICD-10-CM

## 2025-03-19 PROCEDURE — 33285 INSJ SUBQ CAR RHYTHM MNTR: CPT

## 2025-03-19 PROCEDURE — 160046 HCHG PACU - 1ST 60 MINS PHASE II

## 2025-03-19 PROCEDURE — 160002 HCHG RECOVERY MINUTES (STAT)

## 2025-03-19 PROCEDURE — 33285 INSJ SUBQ CAR RHYTHM MNTR: CPT | Performed by: INTERNAL MEDICINE

## 2025-03-19 RX ORDER — LIDOCAINE HYDROCHLORIDE AND EPINEPHRINE 10; 10 MG/ML; UG/ML
INJECTION, SOLUTION INFILTRATION; PERINEURAL
Status: DISCONTINUED
Start: 2025-03-19 | End: 2025-03-19 | Stop reason: HOSPADM

## 2025-03-19 RX ORDER — LIDOCAINE HYDROCHLORIDE AND EPINEPHRINE 10; 10 MG/ML; UG/ML
10 INJECTION, SOLUTION INFILTRATION; PERINEURAL ONCE
Status: DISCONTINUED | OUTPATIENT
Start: 2025-03-19 | End: 2025-03-19 | Stop reason: HOSPADM

## 2025-03-19 ASSESSMENT — FIBROSIS 4 INDEX: FIB4 SCORE: 2.56

## 2025-03-19 NOTE — DISCHARGE INSTRUCTIONS
Implantable Loop Recorder    A loop recorder is a device that is implanted under the skin and can be used to watch for causes of fainting, palpitations, or other abnormal heart rhythms.  It works as a continuous EKG and can have automatic triggers to store recordings or can be patient activated to store recordings.  Loop Recorders can be kept in place for up to 2-3 years.  When the recorder is no longer necessary, it will be removed.   Instructions to give patients with a loop recorder placement:  Keep your dressing dry for 7 days if gauze and tegaderm in place.  Keep dressing in place until follow-up appointment.  Do not submerge site, until cleared at follow-up (no baths, swimming, hot tubs, etc.)  Any questions or concerns please call [facility] at [phone]

## 2025-03-19 NOTE — PROGRESS NOTES
Pt to bedside post loop insertion via Dr. Shore, [employer] tech at bedside to go over care instructions and answer questions. Dr. Shore at bedside to discuss care at home. All questions and concerns addressed. VSS, pt has belongings on person and plan to discharge home has been discussed.     1032 - discussed discharge instructions and education, pt is agreeable and has no additional questions or concerns. Pt is able to get dressed and walk herself out. She has all belongings on person.

## 2025-03-19 NOTE — OP REPORT
[facility]     PROCEDURE PERFORMED: Implantable Loop Recorder    DATE OF SERVICE:  3/19/2025    : Dwayne Shore MD    ASSISTANT: None    ANESTHESIA: Local    EBL: <5 cc    SPECIMENS: None    INDICATION(S):  Cryptogenic stroke    DESCRIPTION OF PROCEDURE:  After informed written consent, the patient was brought to the cath lab pre/post procedure area. The patient was prepped and draped in the usual sterile fashion. The procedure was performed with local anesthetic. Using the supplied incision tool, a <1 cm incision was made in the skin about 2 cm leftward and lateral to the sternal border. Using the supplied insertion tool, a tunnel was made in the subcutaneous tissue at a 45 degree angle to the sternum and the device was inserted with the supplied plunger. Manual compression was used until hemostasis achieved. 4-0 vicryl used and Dermabond used to close the wound. Sterile dressing was applied.    IMPLANTED DEVICE INFORMATION:  Model: [id number]   Serial number: [id number]     IMPRESSIONS:  1. Successful implantable loop recorder implantation    RECOMMENDATIONS:  1. Routine follow-up and device interrogation

## 2025-04-09 ENCOUNTER — TELEPHONE (OUTPATIENT)
Dept: CARDIOLOGY | Facility: MEDICAL CENTER | Age: 80
End: 2025-04-09
Payer: MEDICARE

## 2025-04-09 NOTE — TELEPHONE ENCOUNTER
Called patient back and was able to get her monitor connected, patient understood, had no further questions and was appreciative of call.

## 2025-04-09 NOTE — TELEPHONE ENCOUNTER
AK  Caller: Felipa Stuart     Topic/issue: Patient received a letter from [employer] stating she is not enrolled.     Patient would like assistance setting this up.     Callback Number: [phone]     Thank you   Divina WILLS

## 2025-04-19 ENCOUNTER — NON-PROVIDER VISIT (OUTPATIENT)
Dept: CARDIOLOGY | Facility: MEDICAL CENTER | Age: 80
End: 2025-04-19
Payer: MEDICARE

## 2025-04-21 PROCEDURE — 93298 REM INTERROG DEV EVAL SCRMS: CPT | Mod: 26 | Performed by: STUDENT IN AN ORGANIZED HEALTH CARE EDUCATION/TRAINING PROGRAM

## 2025-05-09 NOTE — CARDIAC REMOTE MONITOR - SCAN
Device transmission reviewed. Device demonstrated appropriate function.       Electronically Signed by: Christofer Polo MD, PhD    5/19/2025  12:32 PM

## 2025-05-20 ENCOUNTER — NON-PROVIDER VISIT (OUTPATIENT)
Dept: CARDIOLOGY | Facility: MEDICAL CENTER | Age: 80
End: 2025-05-20
Payer: MEDICARE

## 2025-05-20 PROCEDURE — 93298 REM INTERROG DEV EVAL SCRMS: CPT | Mod: 26 | Performed by: INTERNAL MEDICINE

## 2025-05-20 NOTE — CARDIAC REMOTE MONITOR - SCAN
Device transmission reviewed. Device demonstrated appropriate function.       Electronically Signed by: Tayo Hamilton M.D.    5/20/2025  11:54 AM

## 2025-06-18 ENCOUNTER — HOSPITAL ENCOUNTER (OUTPATIENT)
Dept: LAB | Facility: MEDICAL CENTER | Age: 80
End: 2025-06-18
Attending: INTERNAL MEDICINE
Payer: MEDICARE

## 2025-06-18 LAB
25(OH)D3 SERPL-MCNC: 96 NG/ML (ref 30–100)
ALBUMIN SERPL BCP-MCNC: 4.1 G/DL (ref 3.2–4.9)
ALBUMIN/GLOB SERPL: 1.8 G/DL
ALP SERPL-CCNC: 94 U/L (ref 30–99)
ALT SERPL-CCNC: 20 U/L (ref 2–50)
ANION GAP SERPL CALC-SCNC: 12 MMOL/L (ref 7–16)
AST SERPL-CCNC: 27 U/L (ref 12–45)
BILIRUB SERPL-MCNC: 1 MG/DL (ref 0.1–1.5)
BUN SERPL-MCNC: 20 MG/DL (ref 8–22)
CALCIUM ALBUM COR SERPL-MCNC: 9.2 MG/DL (ref 8.5–10.5)
CALCIUM SERPL-MCNC: 9.3 MG/DL (ref 8.5–10.5)
CHLORIDE SERPL-SCNC: 105 MMOL/L (ref 96–112)
CO2 SERPL-SCNC: 24 MMOL/L (ref 20–33)
CREAT SERPL-MCNC: 0.83 MG/DL (ref 0.5–1.4)
GFR SERPLBLD CREATININE-BSD FMLA CKD-EPI: 71 ML/MIN/1.73 M 2
GLOBULIN SER CALC-MCNC: 2.3 G/DL (ref 1.9–3.5)
GLUCOSE SERPL-MCNC: 89 MG/DL (ref 65–99)
POTASSIUM SERPL-SCNC: 4.5 MMOL/L (ref 3.6–5.5)
PROT SERPL-MCNC: 6.4 G/DL (ref 6–8.2)
PTH-INTACT SERPL-MCNC: 51.6 PG/ML (ref 14–72)
SODIUM SERPL-SCNC: 141 MMOL/L (ref 135–145)
T3FREE SERPL-MCNC: 3.46 PG/ML (ref 2–4.4)
T4 FREE SERPL-MCNC: 1.29 NG/DL (ref 0.93–1.7)
TSH SERPL-ACNC: 0.01 UIU/ML (ref 0.38–5.33)

## 2025-06-18 PROCEDURE — 36415 COLL VENOUS BLD VENIPUNCTURE: CPT

## 2025-06-18 PROCEDURE — 82306 VITAMIN D 25 HYDROXY: CPT

## 2025-06-18 PROCEDURE — 83970 ASSAY OF PARATHORMONE: CPT

## 2025-06-18 PROCEDURE — 84439 ASSAY OF FREE THYROXINE: CPT

## 2025-06-18 PROCEDURE — 80053 COMPREHEN METABOLIC PANEL: CPT

## 2025-06-18 PROCEDURE — 84443 ASSAY THYROID STIM HORMONE: CPT

## 2025-06-18 PROCEDURE — 84481 FREE ASSAY (FT-3): CPT

## 2025-06-20 ENCOUNTER — NON-PROVIDER VISIT (OUTPATIENT)
Dept: CARDIOLOGY | Facility: MEDICAL CENTER | Age: 80
End: 2025-06-20
Payer: MEDICARE

## 2025-06-20 PROCEDURE — 93298 REM INTERROG DEV EVAL SCRMS: CPT | Mod: 26 | Performed by: INTERNAL MEDICINE

## 2025-06-23 ENCOUNTER — TELEPHONE (OUTPATIENT)
Dept: CARDIOLOGY | Facility: MEDICAL CENTER | Age: 80
End: 2025-06-23
Payer: MEDICARE

## 2025-06-23 NOTE — TELEPHONE ENCOUNTER
[facility] for pt informing her that per SC she doesn't have to come to appt tomorrow unless she has concerns. Asked pt to pls call back. /cg 06/23/25  ----- Message from Nurse Practitioner LAURA BenjaminRMELONIE. sent at 6/23/2025 11:27 AM PDT -----  Regarding: cancel appt?  She scheduled this apt before her last apt with AK in March.    She doesn't have to see AK or myself until September unless she has concerns and wants to come in tomorrow.    Thanks,    Magali :)

## 2025-06-24 ENCOUNTER — OFFICE VISIT (OUTPATIENT)
Dept: CARDIOLOGY | Facility: MEDICAL CENTER | Age: 80
End: 2025-06-24
Attending: NURSE PRACTITIONER
Payer: MEDICARE

## 2025-06-24 VITALS
HEART RATE: 64 BPM | WEIGHT: 170 LBS | BODY MASS INDEX: 24.34 KG/M2 | OXYGEN SATURATION: 98 % | SYSTOLIC BLOOD PRESSURE: 130 MMHG | DIASTOLIC BLOOD PRESSURE: 72 MMHG | RESPIRATION RATE: 16 BRPM | HEIGHT: 70 IN

## 2025-06-24 DIAGNOSIS — E78.49 OTHER HYPERLIPIDEMIA: Primary | ICD-10-CM

## 2025-06-24 DIAGNOSIS — G45.9 TIA (TRANSIENT ISCHEMIC ATTACK): ICD-10-CM

## 2025-06-24 DIAGNOSIS — Z86.79 S/P ABLATION OF VENTRICULAR ARRHYTHMIA: ICD-10-CM

## 2025-06-24 DIAGNOSIS — Z98.890 S/P ABLATION OF VENTRICULAR ARRHYTHMIA: ICD-10-CM

## 2025-06-24 DIAGNOSIS — I49.3 VPC'S (VENTRICULAR PREMATURE COMPLEXES): ICD-10-CM

## 2025-06-24 DIAGNOSIS — F17.200 TOBACCO DEPENDENCE SYNDROME: ICD-10-CM

## 2025-06-24 PROCEDURE — 99212 OFFICE O/P EST SF 10 MIN: CPT | Performed by: NURSE PRACTITIONER

## 2025-06-24 PROCEDURE — 99214 OFFICE O/P EST MOD 30 MIN: CPT | Performed by: NURSE PRACTITIONER

## 2025-06-24 PROCEDURE — 3075F SYST BP GE 130 - 139MM HG: CPT | Performed by: NURSE PRACTITIONER

## 2025-06-24 PROCEDURE — 3078F DIAST BP <80 MM HG: CPT | Performed by: NURSE PRACTITIONER

## 2025-06-24 ASSESSMENT — ENCOUNTER SYMPTOMS
CLAUDICATION: 0
ORTHOPNEA: 0
COUGH: 0
ABDOMINAL PAIN: 0
DIZZINESS: 0
SHORTNESS OF BREATH: 0
PND: 0
FEVER: 0
MYALGIAS: 0
PALPITATIONS: 1

## 2025-06-24 NOTE — PROGRESS NOTES
Subjective:   Felipa Stuart is a 80 y.o. female who presents today for review of symptoms.    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    She presents today alone. She just lost her  one month ago and this has been stressful for her.    One remote event of presumed palpitation with chest tightness across chest wall, brief event. Will review ILR report.    We discussed her echocardiogram results in detail. Palpitations at times that she feels in her throat. ILR report pending for today, message to technicians.    She has had no episodes of shortness of breath, chest pain, dizziness/lightheadedness, or orthopnea.    Past Medical History:   Diagnosis Date    AIVR (accelerated idioventricular rhythm) (HCC) on Zio     Anesthesia     post op nausea    Arrhythmia     bigeminal PVC's, SVT with questionable Afib    Arthritis     maybe, no official diagnosis    Bradycardia     CATARACT     bianka IOL    Chicken pox     as child     Chronic anticoagulation - concern for afib     Heart burn     Hiatal hernia     Hiatus hernia syndrome     repaired 2/28/2016    High cholesterol     HX: benign breast biopsy     Rt breast    Hypothyroidism     Measles     as child     PFO (patent foramen ovale)     PONV (postoperative nausea and vomiting)     Pregnant 1965    first child    Scarlet fever     as child     Stroke (HCC) 02/28/2016    mild speech delay    TIA (transient ischemic attack) 06/11/2013     Past Surgical History:   Procedure Laterality Date    PB TOTAL KNEE ARTHROPLASTY Right 10/18/2022    Procedure: RIGHT TOTAL KNEE ARTHROPLASTY;  Surgeon: Hemant Vaughan M.D.;  Location: [facility] - External Fac[facility];  Service: Orthopedics    SC LAP ESOPHAGOGAST FUNDOPLASTY  07/16/2019    Procedure: FUNDOPLICATION, NISSEN, LAPAROSCOPIC- REPAIR PARAESOPHAGEAL HIATAL HERNIA WITH MESH;  Surgeon: Stiven SOLIS  Ganser, M.D.;  Location: SURGERY [facility];  Service: General    RECOVERY  2016    Procedure: CATH LAB-PVC ABLATION WAGNER-REGAN -CARTO;  Surgeon: Sen Surgery;  Location: SURGERY PRE-POST PROC UNIT [facility];  Service:     NISSEN FUNDOPLICATION LAPAROSCOPIC  2016    hiatal hernia repair x 3, Dr Darnell    FULL THICKNESS BLOCK RESECTION  2014    Performed by You Abbott M.D. at [facility]    OTHER  2013    bladder sling     CHOLECYSTECTOMY  2013    CATARACT EXTRACTION WITH IOL Bilateral 2008    LUMPECTOMY Right 1992    right breast    THYROID LOBECTOMY  1976    OTHER  1975    D&C and tubal ligation     TONSILLECTOMY  1963    OTHER CARDIAC SURGERY  2016    ablation    ZZZ CARDIAC CATH      ablation     Family History   Problem Relation Age of Onset    Cancer Mother         [place] cancer     Social History     Tobacco Use   Smoking Status Former    Current packs/day: 0.00    Average packs/day: 0.2 packs/day for 40.0 years (8.0 ttl pk-yrs)    Types: Cigarettes    Start date: 1960    Quit date: 1980    Years since quittin.5   Smokeless Tobacco Never     No Known Allergies  Outpatient Encounter Medications as of 2025   Medication Sig Dispense Refill    vitamin D3 (CHOLECALCIFEROL) 1000 Unit (25 mcg) Tab Take 4,000 Units by mouth every day.      atorvastatin (LIPITOR) 20 MG Tab Take 1 Tablet by mouth at bedtime. 100 Tablet 3    multivitamin Tab Take 1 Tablet by mouth 2 (two) times a day.      aspirin EC (ECOTRIN) 81 MG Tablet Delayed Response Take 1 Tablet by mouth every day. 90 Tablet 3    liothyronine (CYTOMEL) 5 MCG Tab Take 10 mcg by mouth every day.      LEVOXYL 75 MCG Tab Take 75 mcg by mouth every morning on an empty stomach. Except on Thursday and  takes extra half a pill      omeprazole (PRILOSEC) 20 MG delayed-release capsule Take 20 mg by mouth every day.       No facility-administered encounter medications on file as of 2025.     Review of  "Systems   Constitutional:  Negative for fever and malaise/fatigue.   Respiratory:  Negative for cough and shortness of breath.    Cardiovascular:  Positive for palpitations and leg swelling. Negative for chest pain, orthopnea, claudication and PND.   Gastrointestinal:  Negative for abdominal pain.   Musculoskeletal:  Positive for joint pain. Negative for myalgias.   Neurological:  Negative for dizziness.        Objective:   /72 (BP Location: Left arm, Patient Position: Sitting, BP Cuff Size: Adult)   Pulse 64   Resp 16   Ht 1.778 m (5' 10\")   Wt 77.1 kg (170 lb)   SpO2 98%   BMI 24.39 kg/m²     Physical Exam  Vitals and nursing note reviewed.   Constitutional:       General: She is not in acute distress.     Appearance: Normal appearance. She is well-developed and normal weight.   HENT:      Head: Normocephalic and atraumatic.   Neck:      Vascular: No JVD.   Cardiovascular:      Rate and Rhythm: Normal rate and regular rhythm.      Pulses: Normal pulses.      Heart sounds: Normal heart sounds. No murmur heard.  Pulmonary:      Effort: Pulmonary effort is normal. No respiratory distress.      Breath sounds: Normal breath sounds.   Abdominal:      General: Bowel sounds are normal.      Palpations: Abdomen is soft.   Musculoskeletal:         General: Normal range of motion.      Cervical back: Normal range of motion.      Right lower leg: Edema present.      Left lower leg: Edema present.      Comments: Spider veins on bilateral legs with mild edema non-pitting   Skin:     General: Skin is warm and dry.      Capillary Refill: Capillary refill takes less than 2 seconds.   Neurological:      General: No focal deficit present.      Mental Status: She is alert and oriented to person, place, and time. Mental status is at baseline.   Psychiatric:         Mood and Affect: Mood normal.         Behavior: Behavior normal.         Thought Content: Thought content normal.         Judgment: Judgment normal. "         Assessment:     1. Other hyperlipidemia        2. S/P ablation of ventricular arrhythmia        3. TIA (transient ischemic attack)        4. Tobacco dependence syndrome        5. VPC's (ventricular premature complexes)            Medical Decision Making:  Today's Assessment / Status / Plan:     1. PVC's/PAC's with PVC ablation in '16, afib?  -follow, ILR in place, follow report for today's event  -OK to add low dose bb for further palpitations  -taken off eliquis with spontaneous hemarthrosis  -cont asa only   -consider Watchman if afib present    2. HLD with TIA/CVA in '16, new finding of small PFO  -no focal deficits, some word finding impairment remains but mild  -cont asa and statin (refilled)  -annual labs in December 2025  -no afib on last heart monitor, now with ILR  -obtain repeat echo with bubble study in 3-6 months showing stable ASD, consider PFO closure if no afib in 6 months  -LDL goal <70 with CVA, follow labs     Patient is to follow up with Dr. Avila in 3 months as planned.

## 2025-06-26 ENCOUNTER — TELEPHONE (OUTPATIENT)
Dept: CARDIOLOGY | Facility: MEDICAL CENTER | Age: 80
End: 2025-06-26
Payer: MEDICARE

## 2025-06-26 NOTE — TELEPHONE ENCOUNTER
----- Message from Nurse Practitioner CHALO Benjamin sent at 6/25/2025 10:39 AM PDT -----  Regarding: RE: ILR report  Thank you Rachna!    Mickie, can you write her a my chart about ILR report.    Magali :)  ----- Message -----  From: Rachna Liao, Med Ass't  Sent: 6/25/2025  10:29 AM PDT  To: CHALO Curtis  Subject: RE: ILR report                                   She didn't have any episodes yesterday so likely it wasn't fast or long enough for her loop to . The most recent episode I show is 5/26/2025 @ 8:38 and looks to be SVT lasting 23 seconds w/ average 194 BPM.  ----- Message -----  From: CHALO Curtis  Sent: 6/24/2025   2:00 PM PDT  To: Riddhi Wynn, Med Ass't; Rachna Bonilla  Subject: ILR report                                       She had a brief palpitation event this morning around 0815 AM. Can we get review of this time period please with her ILR.    Magali :)

## 2025-07-07 ENCOUNTER — PATIENT MESSAGE (OUTPATIENT)
Dept: CARDIOLOGY | Facility: MEDICAL CENTER | Age: 80
End: 2025-07-07
Payer: MEDICARE

## 2025-07-07 NOTE — PATIENT COMMUNICATION
KARTHIK Curtis. to Me (Selected Message)    7/7/25  8:27 AM  So they are recommending that she takes all these medications? Would recommend she ask her PCP which medication is preferred and then ask pharmacist for concern for interactions with her medications. I have no concerns from a heart standpoint on her taking any of these medications. [place]

## 2025-07-21 ENCOUNTER — NON-PROVIDER VISIT (OUTPATIENT)
Dept: CARDIOLOGY | Facility: MEDICAL CENTER | Age: 80
End: 2025-07-21
Payer: MEDICARE

## 2025-07-21 PROCEDURE — 93298 REM INTERROG DEV EVAL SCRMS: CPT | Mod: 26 | Performed by: INTERNAL MEDICINE

## 2025-08-21 ENCOUNTER — NON-PROVIDER VISIT (OUTPATIENT)
Dept: CARDIOLOGY | Facility: MEDICAL CENTER | Age: 80
End: 2025-08-21
Payer: MEDICARE

## 2025-08-21 PROCEDURE — 93298 REM INTERROG DEV EVAL SCRMS: CPT | Mod: 26 | Performed by: INTERNAL MEDICINE

## (undated) DEVICE — POUCH 750ML TISSUE 5 X 8

## (undated) DEVICE — SLEEVE, VASO, THIGH, MED

## (undated) DEVICE — SUCTION INSTRUMENT YANKAUER BULBOUS TIP W/O VENT (50EA/CA)

## (undated) DEVICE — GLOVE BIOGEL INDICATOR SZ 8 SURGICAL PF LTX - (50/BX 4BX/CA)

## (undated) DEVICE — TUBING CLEARLINK DUO-VENT - C-FLO (48EA/CA)

## (undated) DEVICE — DRESSING TRANSPARENT FILM TEGADERM 2.375 X 2.75"  (100EA/BX)"

## (undated) DEVICE — TUBE NG SALEM SUMP 16FR (50EA/CA)

## (undated) DEVICE — TROCAR 5X100 NON BLADED Z-TH - READ KII (6/BX)

## (undated) DEVICE — SENSOR SPO2 NEO LNCS ADHESIVE (20/BX) SEE USER NOTES

## (undated) DEVICE — SUTURE 0 VICRYL PLUS CT-2 - 27 INCH (36/BX)

## (undated) DEVICE — CHLORAPREP 26 ML APPLICATOR - ORANGE TINT(25/CA)

## (undated) DEVICE — SET EXTENSION WITH 2 PORTS (48EA/CA) ***PART #2C8610 IS A SUBSTITUTE*****

## (undated) DEVICE — COVER LIGHT HANDLE ALC PLUS DISP (18EA/BX)

## (undated) DEVICE — SET LEADWIRE 5 LEAD BEDSIDE DISPOSABLE ECG (1SET OF 5/EA)

## (undated) DEVICE — GLOVE BIOGEL PI INDICATOR SZ 8.0 SURGICAL PF LF -(50/BX 4BX/CA)

## (undated) DEVICE — LACTATED RINGERS INJ 1000 ML - (14EA/CA 60CA/PF)

## (undated) DEVICE — CANISTER SUCTION 3000ML MECHANICAL FILTER AUTO SHUTOFF MEDI-VAC NONSTERILE LF DISP  (40EA/CA)

## (undated) DEVICE — PROTECTOR ULNA NERVE - (36PR/CA)

## (undated) DEVICE — SUTURE 4-0 VICRYL PLUS FS-2 - 27 INCH (36/BX)

## (undated) DEVICE — ENDOSTITCH10MM SUTURING DEVIC - (3/CA)

## (undated) DEVICE — SODIUM CHL IRRIGATION 0.9% 1000ML (12EA/CA)

## (undated) DEVICE — GLOVE SZ 7.5 BIOGEL PI MICRO - PF LF (50PR/BX)

## (undated) DEVICE — CANNULA W/SEAL 5X100 Z-THRE - ADED KII (12/BX)

## (undated) DEVICE — PACK LAP CHOLE OR - (2EA/CA)

## (undated) DEVICE — GOWN WARMING STANDARD FLEX - (30/CA)

## (undated) DEVICE — GLOVE BIOGEL PI INDICATOR SZ 6.5 SURGICAL PF LF - (50/BX 4BX/CA)

## (undated) DEVICE — NEPTUNE 4 PORT MANIFOLD - (20/PK)

## (undated) DEVICE — SPONGE GAUZESTER. 2X2 4-PL - (2/PK 50PK/BX 30BX/CS)

## (undated) DEVICE — ENDOSTITCH LOAD UNIT 0 SURGI - 12/CA

## (undated) DEVICE — ELECTRODE 850 FOAM ADHESIVE - HYDROGEL RADIOTRNSPRNT (50/PK)

## (undated) DEVICE — SLEEVE VASO CALF MED - (10PR/CA)

## (undated) DEVICE — KIT ROOM DECONTAMINATION

## (undated) DEVICE — MASK ANESTHESIA ADULT  - (100/CA)

## (undated) DEVICE — SYRINGE TOOMEY (50EA/CA)

## (undated) DEVICE — SUTURE GENERAL

## (undated) DEVICE — NEEDLE INSFL 120MM 14GA VRRS - (20/BX)

## (undated) DEVICE — GLOVE SZ 6 BIOGEL PI MICRO - PF LF (50PR/BX 4BX/CA)

## (undated) DEVICE — GLOVE BIOGEL SZ 6 PF LATEX - (50EA/BX 4BX/CA)

## (undated) DEVICE — SEALER VESSEL HARMONIC ACE PLUS WITH ADVANCED HEMOSTASIS 36CM (1/EA)

## (undated) DEVICE — TROCAR Z THREAD11MM OPTICAL - NON BLADED(6/BX)

## (undated) DEVICE — WATER IRRIG. STER. 1500 ML - (9/CA)

## (undated) DEVICE — CLIP MED LG INTNL HRZN TI ESCP - (20/BX)

## (undated) DEVICE — SET TUBING PNEUMOCLEAR HIGH FLOW SMOKE EVACUATION (10EA/BX)

## (undated) DEVICE — ELECTRODE DUAL RETURN W/ CORD - (50/PK)

## (undated) DEVICE — GLOVE BIOGEL M SZ 8 SURGICAL PF LTX - (50/BX 4BX/CA)

## (undated) DEVICE — ELECTRODE 5MM LHK LAPSCP STERILE DISP- MEGADYNE  (5/CA)

## (undated) DEVICE — TUBING LAPAROSCOPIC PLUME DEVICE (10EA/CA)

## (undated) DEVICE — HEAD HOLDER JUNIOR/ADULT

## (undated) DEVICE — SYRINGE 50ML CATHETER TIP (40EA/BX 4BX/CA)

## (undated) DEVICE — SET SUCTION/IRRIGATION WITH DISPOSABLE TIP (6/CA )PART #0250-070-520 IS A SUB

## (undated) DEVICE — GLOVE BIOGEL SZ 8 SURGICAL PF LTX - (50PR/BX 4BX/CA)

## (undated) DEVICE — GLOVE BIOGEL SZ 7.5 SURGICAL PF LTX - (50PR/BX 4BX/CA)

## (undated) DEVICE — GLOVE BIOGEL SZ 6.5 SURGICAL PF LTX (50PR/BX 4BX/CA)

## (undated) DEVICE — SENSOR OXIMETER ADULT SPO2 RD SET (20EA/BX)

## (undated) DEVICE — GLOVE BIOGEL PI INDICATOR SZ 6.0 SURGICAL PF LF -(200PR/CA)

## (undated) DEVICE — KIT ANESTHESIA W/CIRCUIT & 3/LT BAG W/FILTER (20EA/CA)